# Patient Record
Sex: FEMALE | Race: BLACK OR AFRICAN AMERICAN | Employment: OTHER | URBAN - METROPOLITAN AREA
[De-identification: names, ages, dates, MRNs, and addresses within clinical notes are randomized per-mention and may not be internally consistent; named-entity substitution may affect disease eponyms.]

---

## 2017-03-05 ENCOUNTER — HOSPITAL ENCOUNTER (EMERGENCY)
Facility: HOSPITAL | Age: 67
Discharge: HOME | End: 2017-03-05
Payer: MEDICARE

## 2017-03-05 VITALS
HEIGHT: 70 IN | SYSTOLIC BLOOD PRESSURE: 131 MMHG | OXYGEN SATURATION: 94 % | HEART RATE: 69 BPM | TEMPERATURE: 98 F | DIASTOLIC BLOOD PRESSURE: 70 MMHG | RESPIRATION RATE: 18 BRPM

## 2017-03-05 DIAGNOSIS — S81.811A LEG LACERATION, RIGHT, INITIAL ENCOUNTER: Primary | ICD-10-CM

## 2017-03-05 PROCEDURE — 99283 EMERGENCY DEPT VISIT LOW MDM: CPT

## 2017-03-05 PROCEDURE — 90471 IMMUNIZATION ADMIN: CPT

## 2017-03-05 PROCEDURE — 12001 RPR S/N/AX/GEN/TRNK 2.5CM/<: CPT

## 2017-03-05 RX ORDER — CEPHALEXIN 500 MG/1
500 CAPSULE ORAL 4 TIMES DAILY
Qty: 20 CAPSULE | Refills: 0 | Status: SHIPPED | OUTPATIENT
Start: 2017-03-05 | End: 2017-03-10

## 2017-03-05 NOTE — ED PROVIDER NOTES
Patient Seen in: [facility] AND [facility] Emergency Department    History   CC: leg laceration  HPI: Fallon Kearns 78year old female  who presents to the ER c/o right lateral lower leg laceration status post injury today in which she states she was taking out - No data to display    MDM     Procedure: LACERATION REPAIR  Verbal consent was obtained from the patient. The 2 cm laceration on the right leg was anesthetized using 1% Lidocaine.  The wound was scrubbed, draped and explored to its base with a gloved fing

## 2017-03-05 NOTE — ED NOTES
Pt states cutting right lower lateral leg while picking up broken glass. Approx. 2 cm laceration noted, no active bleeding. CMS intact. Laceration irrigated and cleaned by Christy ARCINIEGA.

## 2017-03-05 NOTE — ED NOTES
Pt called by this RN in regards to discharge papers and prescription, no answer from pt. Left voicemail and will place discharge papers at triage.

## 2017-03-16 ENCOUNTER — LAB SERVICES (OUTPATIENT)
Dept: OTHER | Age: 67
End: 2017-03-16

## 2017-03-16 ENCOUNTER — CHARTING TRANS (OUTPATIENT)
Dept: OTHER | Age: 67
End: 2017-03-16

## 2017-03-18 ENCOUNTER — CHARTING TRANS (OUTPATIENT)
Dept: OTHER | Age: 67
End: 2017-03-18

## 2017-03-18 LAB
ALBUMIN SERPL-MCNC: 4.1 G/DL (ref 3.6–5.1)
ALBUMIN/GLOB SERPL: 1.1 (ref 1–2.4)
ALP SERPL-CCNC: 81 UNITS/L (ref 45–117)
ALT SERPL-CCNC: 23 UNITS/L
ANION GAP SERPL CALC-SCNC: 13 MMOL/L (ref 10–20)
AST SERPL-CCNC: 23 UNITS/L
BASOPHILS # BLD: 0 K/MCL (ref 0–0.3)
BASOPHILS NFR BLD: 0 %
BILIRUB SERPL-MCNC: 0.4 MG/DL (ref 0.2–1)
BUN SERPL-MCNC: 18 MG/DL (ref 6–20)
BUN/CREAT SERPL: 17 (ref 7–25)
CALCIUM SERPL-MCNC: 9.9 MG/DL (ref 8.4–10.2)
CHLORIDE SERPL-SCNC: 105 MMOL/L (ref 98–107)
CO2 SERPL-SCNC: 27 MMOL/L (ref 21–32)
CREAT SERPL-MCNC: 1.09 MG/DL (ref 0.51–0.95)
CREATININE RANDOM URINE: 198 MG/DL
DIFFERENTIAL METHOD BLD: ABNORMAL
EOSINOPHIL # BLD: 0.1 K/MCL (ref 0.1–0.5)
EOSINOPHIL NFR BLD: 1 %
ERYTHROCYTE [DISTWIDTH] IN BLOOD: 12.8 % (ref 11–15)
GLOBULIN SER-MCNC: 3.6 G/DL (ref 2–4)
GLUCOSE SERPL-MCNC: 121 MG/DL (ref 65–99)
HBA1C MFR BLD: 6.1 % (ref 4.5–5.6)
HEMATOCRIT: 38.2 % (ref 36–46.5)
HEMOGLOBIN: 12.1 G/DL (ref 12–15.5)
LDLC SERPL DIRECT ASSAY-MCNC: 111 MG/DL
LENGTH OF FAST TIME PATIENT: ABNORMAL HRS
LYMPHOCYTES # BLD: 3.8 K/MCL (ref 1–4)
LYMPHOCYTES NFR BLD: 37 %
MEAN CORPUSCULAR HEMOGLOBIN: 31.3 PG (ref 26–34)
MEAN CORPUSCULAR HGB CONC: 31.7 G/DL (ref 32–36.5)
MEAN CORPUSCULAR VOLUME: 99 FL (ref 78–100)
MICROALBUMIN UR-MCNC: 1.23 MG/DL
MICROALBUMIN/CREAT UR: 6.2 MCG/MG
MONOCYTES # BLD: 0.8 K/MCL (ref 0.3–0.9)
MONOCYTES NFR BLD: 8 %
NEUTROPHILS # BLD: 5.6 K/MCL (ref 1.8–7.7)
NEUTROPHILS NFR BLD: 54 %
PLATELET COUNT: 415 K/MCL (ref 140–450)
POTASSIUM SERPL-SCNC: 3.4 MMOL/L (ref 3.4–5.1)
RED CELL COUNT: 3.86 MIL/MCL (ref 4–5.2)
SODIUM SERPL-SCNC: 142 MMOL/L (ref 135–145)
TOTAL PROTEIN: 7.7 G/DL (ref 6.4–8.2)
WHITE BLOOD COUNT: 10.4 K/MCL (ref 4.2–11)

## 2017-04-28 ENCOUNTER — LAB SERVICES (OUTPATIENT)
Dept: OTHER | Age: 67
End: 2017-04-28

## 2017-04-28 ENCOUNTER — CHARTING TRANS (OUTPATIENT)
Dept: OTHER | Age: 67
End: 2017-04-28

## 2017-04-29 ENCOUNTER — CHARTING TRANS (OUTPATIENT)
Dept: OTHER | Age: 67
End: 2017-04-29

## 2017-04-29 LAB
ANNOTATION COMMENT IMP: NORMAL
HBV CORE IGG+IGM SER QL: NEGATIVE
HBV SURFACE AB SER QL: NEGATIVE
HBV SURFACE AG SER QL: NEGATIVE
HCV AB SER QL: NEGATIVE
HIV 1+2 AB+HIV1 P24 AG SERPL QL IA: NONREACTIVE

## 2017-06-03 ENCOUNTER — LAB SERVICES (OUTPATIENT)
Dept: OTHER | Age: 67
End: 2017-06-03

## 2017-06-03 ENCOUNTER — CHARTING TRANS (OUTPATIENT)
Dept: OTHER | Age: 67
End: 2017-06-03

## 2017-06-03 LAB — RAPID STREP GROUP A: POSITIVE

## 2017-06-20 ENCOUNTER — CHARTING TRANS (OUTPATIENT)
Dept: OTHER | Age: 67
End: 2017-06-20

## 2017-08-07 ENCOUNTER — CHARTING TRANS (OUTPATIENT)
Dept: OTHER | Age: 67
End: 2017-08-07

## 2017-08-07 ASSESSMENT — PAIN SCALES - GENERAL: PAINLEVEL_OUTOF10: 0

## 2017-08-24 ENCOUNTER — LAB SERVICES (OUTPATIENT)
Dept: OTHER | Age: 67
End: 2017-08-24

## 2017-08-25 ENCOUNTER — CHARTING TRANS (OUTPATIENT)
Dept: OTHER | Age: 67
End: 2017-08-25

## 2017-08-25 LAB
BUN SERPL-MCNC: 18 MG/DL (ref 6–20)
BUN/CREAT SERPL: 19 (ref 7–25)
CREAT SERPL-MCNC: 0.96 MG/DL (ref 0.51–0.95)

## 2017-11-14 ENCOUNTER — CHARTING TRANS (OUTPATIENT)
Dept: OTHER | Age: 67
End: 2017-11-14

## 2017-11-14 ENCOUNTER — LAB SERVICES (OUTPATIENT)
Dept: OTHER | Age: 67
End: 2017-11-14

## 2017-11-17 ENCOUNTER — CHARTING TRANS (OUTPATIENT)
Dept: OTHER | Age: 67
End: 2017-11-17

## 2017-11-17 LAB
ALBUMIN SERPL-MCNC: 3.8 G/DL (ref 3.6–5.1)
ALBUMIN/GLOB SERPL: 1.1 (ref 1–2.4)
ALP SERPL-CCNC: 85 UNITS/L (ref 45–117)
ALT SERPL-CCNC: 23 UNITS/L
ANION GAP SERPL CALC-SCNC: 11 MMOL/L (ref 10–20)
APPEARANCE UR: CLEAR
AST SERPL-CCNC: 16 UNITS/L
BACTERIA #/AREA URNS HPF: NORMAL /HPF
BASOPHILS # BLD: 0 K/MCL (ref 0–0.3)
BASOPHILS NFR BLD: 0 %
BILIRUB SERPL-MCNC: 0.4 MG/DL (ref 0.2–1)
BILIRUB UR QL: NEGATIVE
BUN SERPL-MCNC: 14 MG/DL (ref 6–20)
BUN/CREAT SERPL: 15 (ref 7–25)
CALCIUM SERPL-MCNC: 9.8 MG/DL (ref 8.4–10.2)
CHLORIDE SERPL-SCNC: 106 MMOL/L (ref 98–107)
CO2 SERPL-SCNC: 29 MMOL/L (ref 21–32)
COLOR UR: YELLOW
CREAT SERPL-MCNC: 0.91 MG/DL (ref 0.51–0.95)
DIFFERENTIAL METHOD BLD: ABNORMAL
EOSINOPHIL # BLD: 0.1 K/MCL (ref 0.1–0.5)
EOSINOPHIL NFR BLD: 2 %
ERYTHROCYTE [DISTWIDTH] IN BLOOD: 13.1 % (ref 11–15)
GLOBULIN SER-MCNC: 3.6 G/DL (ref 2–4)
GLUCOSE SERPL-MCNC: 111 MG/DL (ref 65–99)
GLUCOSE UR-MCNC: NEGATIVE MG/DL
HBA1C MFR BLD: 6.5 % (ref 4.5–5.6)
HDLC SERPL-MCNC: 64 MG/DL
HEMATOCRIT: 37.4 % (ref 36–46.5)
HEMOGLOBIN: 12 G/DL (ref 12–15.5)
HYALINE CASTS #/AREA URNS LPF: NORMAL /LPF (ref 0–5)
KETONES UR-MCNC: NEGATIVE MG/DL
LDLC SERPL DIRECT ASSAY-MCNC: 108 MG/DL
LENGTH OF FAST TIME PATIENT: 0 HRS
LYMPHOCYTES # BLD: 3.4 K/MCL (ref 1–4)
LYMPHOCYTES NFR BLD: 41 %
MEAN CORPUSCULAR HEMOGLOBIN: 31.6 PG (ref 26–34)
MEAN CORPUSCULAR HGB CONC: 32.1 G/DL (ref 32–36.5)
MEAN CORPUSCULAR VOLUME: 98.4 FL (ref 78–100)
MONOCYTES # BLD: 0.7 K/MCL (ref 0.3–0.9)
MONOCYTES NFR BLD: 9 %
MUCOUS THREADS URNS QL MICRO: PRESENT
NEUTROPHILS # BLD: 4 K/MCL (ref 1.8–7.7)
NEUTROPHILS NFR BLD: 48 %
NITRITE UR QL: NEGATIVE
PH UR: 5 UNITS (ref 5–7)
PLATELET COUNT: 416 K/MCL (ref 140–450)
POTASSIUM SERPL-SCNC: 4 MMOL/L (ref 3.4–5.1)
PROT UR QL: NEGATIVE MG/DL
RBC #/AREA URNS HPF: NORMAL /HPF (ref 0–3)
RBC-URINE: NEGATIVE
RED CELL COUNT: 3.8 MIL/MCL (ref 4–5.2)
SODIUM SERPL-SCNC: 142 MMOL/L (ref 135–145)
SP GR UR: 1.02 (ref 1–1.03)
SPECIMEN SOURCE: NORMAL
SQUAMOUS #/AREA URNS HPF: NORMAL /HPF (ref 0–5)
TOTAL PROTEIN: 7.4 G/DL (ref 6.4–8.2)
UROBILINOGEN UR QL: 0.2 MG/DL (ref 0–1)
WBC #/AREA URNS HPF: NORMAL /HPF (ref 0–5)
WBC-URINE: NEGATIVE
WHITE BLOOD COUNT: 8.2 K/MCL (ref 4.2–11)

## 2018-04-17 ENCOUNTER — LAB SERVICES (OUTPATIENT)
Dept: OTHER | Age: 68
End: 2018-04-17

## 2018-04-17 ENCOUNTER — CHARTING TRANS (OUTPATIENT)
Dept: OTHER | Age: 68
End: 2018-04-17

## 2018-04-18 ENCOUNTER — CHARTING TRANS (OUTPATIENT)
Dept: OTHER | Age: 68
End: 2018-04-18

## 2018-04-18 LAB
ALBUMIN SERPL-MCNC: 4 G/DL (ref 3.6–5.1)
ALBUMIN/GLOB SERPL: 1.1 (ref 1–2.4)
ALP SERPL-CCNC: 84 UNITS/L (ref 45–117)
ALT SERPL-CCNC: 37 UNITS/L
ANION GAP SERPL CALC-SCNC: 12 MMOL/L (ref 10–20)
APPEARANCE UR: CLEAR
AST SERPL-CCNC: 22 UNITS/L
BACTERIA #/AREA URNS HPF: ABNORMAL /HPF
BASOPHILS # BLD: 0 K/MCL (ref 0–0.3)
BASOPHILS NFR BLD: 0 %
BILIRUB SERPL-MCNC: 0.5 MG/DL (ref 0.2–1)
BILIRUB UR QL: NEGATIVE
BUN SERPL-MCNC: 13 MG/DL (ref 6–20)
BUN/CREAT SERPL: 14 (ref 7–25)
CALCIUM SERPL-MCNC: 9.5 MG/DL (ref 8.4–10.2)
CHLORIDE SERPL-SCNC: 105 MMOL/L (ref 98–107)
CHOLEST SERPL-MCNC: 188 MG/DL
CHOLEST/HDLC SERPL: 2.8
CO2 SERPL-SCNC: 28 MMOL/L (ref 21–32)
COLOR UR: YELLOW
CREAT SERPL-MCNC: 0.94 MG/DL (ref 0.51–0.95)
DIFFERENTIAL METHOD BLD: ABNORMAL
EOSINOPHIL # BLD: 0.1 K/MCL (ref 0.1–0.5)
EOSINOPHIL NFR BLD: 1 %
ERYTHROCYTE [DISTWIDTH] IN BLOOD: 12.6 % (ref 11–15)
GLOBULIN SER-MCNC: 3.8 G/DL (ref 2–4)
GLUCOSE SERPL-MCNC: 122 MG/DL (ref 65–99)
GLUCOSE UR-MCNC: NEGATIVE MG/DL
HBA1C MFR BLD: 6.3 % (ref 4.5–5.6)
HDLC SERPL-MCNC: 68 MG/DL
HEMATOCRIT: 40.8 % (ref 36–46.5)
HEMOGLOBIN: 13 G/DL (ref 12–15.5)
HYALINE CASTS #/AREA URNS LPF: ABNORMAL /LPF (ref 0–5)
IMM GRANULOCYTES # BLD AUTO: 0 K/MCL (ref 0–0.2)
IMM GRANULOCYTES NFR BLD: 0 %
KETONES UR-MCNC: NEGATIVE MG/DL
LDLC SERPL CALC-MCNC: 107 MG/DL
LENGTH OF FAST TIME PATIENT: ABNORMAL HRS
LENGTH OF FAST TIME PATIENT: ABNORMAL HRS
LYMPHOCYTES # BLD: 3.7 K/MCL (ref 1–4)
LYMPHOCYTES NFR BLD: 37 %
MEAN CORPUSCULAR HEMOGLOBIN: 31.5 PG (ref 26–34)
MEAN CORPUSCULAR HGB CONC: 31.9 G/DL (ref 32–36.5)
MEAN CORPUSCULAR VOLUME: 98.8 FL (ref 78–100)
MONOCYTES # BLD: 0.8 K/MCL (ref 0.3–0.9)
MONOCYTES NFR BLD: 8 %
MUCOUS THREADS URNS QL MICRO: PRESENT
NEUTROPHILS # BLD: 5.3 K/MCL (ref 1.8–7.7)
NEUTROPHILS NFR BLD: 54 %
NITRITE UR QL: NEGATIVE
NONHDLC SERPL-MCNC: 120 MG/DL
NRBC (NRBCRE): 0 %
PH UR: 7 UNITS (ref 5–7)
PLATELET COUNT: 394 K/MCL (ref 140–450)
POTASSIUM SERPL-SCNC: 3.8 MMOL/L (ref 3.4–5.1)
PROT UR QL: NEGATIVE MG/DL
RBC #/AREA URNS HPF: ABNORMAL /HPF (ref 0–3)
RBC-URINE: NEGATIVE
RED CELL COUNT: 4.13 MIL/MCL (ref 4–5.2)
SODIUM SERPL-SCNC: 141 MMOL/L (ref 135–145)
SP GR UR: 1.02 (ref 1–1.03)
SPECIMEN SOURCE: ABNORMAL
SQUAMOUS #/AREA URNS HPF: ABNORMAL /HPF (ref 0–5)
TOTAL PROTEIN: 7.8 G/DL (ref 6.4–8.2)
TRIGL SERPL-MCNC: 67 MG/DL
TSH SERPL-ACNC: 2.49 MCUNITS/ML (ref 0.35–5)
UROBILINOGEN UR QL: 0.2 MG/DL (ref 0–1)
WBC #/AREA URNS HPF: ABNORMAL /HPF (ref 0–5)
WBC-URINE: NEGATIVE
WHITE BLOOD COUNT: 9.9 K/MCL (ref 4.2–11)

## 2018-07-16 ENCOUNTER — LAB SERVICES (OUTPATIENT)
Dept: OTHER | Age: 68
End: 2018-07-16

## 2018-07-16 ENCOUNTER — CHARTING TRANS (OUTPATIENT)
Dept: OTHER | Age: 68
End: 2018-07-16

## 2018-07-17 ENCOUNTER — CHARTING TRANS (OUTPATIENT)
Dept: OTHER | Age: 68
End: 2018-07-17

## 2018-07-17 LAB — HBA1C MFR BLD: 6.7 % (ref 4.5–5.6)

## 2018-08-06 ENCOUNTER — CHARTING TRANS (OUTPATIENT)
Dept: OTHER | Age: 68
End: 2018-08-06

## 2018-10-16 ENCOUNTER — CHARTING TRANS (OUTPATIENT)
Dept: OTHER | Age: 68
End: 2018-10-16

## 2018-10-16 ENCOUNTER — LAB SERVICES (OUTPATIENT)
Dept: OTHER | Age: 68
End: 2018-10-16

## 2018-10-16 LAB — HBA1C MFR BLD: 6.7 % (ref 4.5–5.6)

## 2018-10-18 ENCOUNTER — CHARTING TRANS (OUTPATIENT)
Dept: OTHER | Age: 68
End: 2018-10-18

## 2018-10-18 LAB
CREATININE RANDOM URINE: 250 MG/DL
HDLC SERPL-MCNC: 61 MG/DL
LDLC SERPL DIRECT ASSAY-MCNC: 129 MG/DL
MAGNESIUM SERPL-MCNC: 1.9 MG/DL (ref 1.7–2.4)
MICROALBUMIN UR-MCNC: 1.3 MG/DL
MICROALBUMIN/CREAT UR: 5.2 MG/G
TSH SERPL-ACNC: 2.31 MCUNITS/ML (ref 0.35–5)

## 2018-10-31 VITALS
BODY MASS INDEX: 41.95 KG/M2 | DIASTOLIC BLOOD PRESSURE: 76 MMHG | HEART RATE: 80 BPM | HEIGHT: 70 IN | RESPIRATION RATE: 18 BRPM | SYSTOLIC BLOOD PRESSURE: 140 MMHG | TEMPERATURE: 98.5 F | WEIGHT: 293 LBS

## 2018-11-01 VITALS
TEMPERATURE: 98.7 F | SYSTOLIC BLOOD PRESSURE: 135 MMHG | DIASTOLIC BLOOD PRESSURE: 86 MMHG | HEART RATE: 76 BPM | RESPIRATION RATE: 18 BRPM | HEIGHT: 70 IN | WEIGHT: 293 LBS | BODY MASS INDEX: 41.95 KG/M2

## 2018-11-02 VITALS
HEART RATE: 86 BPM | HEIGHT: 70 IN | BODY MASS INDEX: 41.66 KG/M2 | DIASTOLIC BLOOD PRESSURE: 71 MMHG | SYSTOLIC BLOOD PRESSURE: 135 MMHG | RESPIRATION RATE: 20 BRPM | TEMPERATURE: 98.4 F | WEIGHT: 291 LBS

## 2018-11-03 VITALS
DIASTOLIC BLOOD PRESSURE: 89 MMHG | WEIGHT: 289 LBS | TEMPERATURE: 98.2 F | BODY MASS INDEX: 41.37 KG/M2 | SYSTOLIC BLOOD PRESSURE: 129 MMHG | RESPIRATION RATE: 20 BRPM | HEART RATE: 79 BPM | HEIGHT: 70 IN

## 2018-11-03 VITALS
RESPIRATION RATE: 20 BRPM | OXYGEN SATURATION: 100 % | TEMPERATURE: 98.7 F | HEIGHT: 70 IN | DIASTOLIC BLOOD PRESSURE: 84 MMHG | BODY MASS INDEX: 40.8 KG/M2 | WEIGHT: 285 LBS | SYSTOLIC BLOOD PRESSURE: 150 MMHG | HEART RATE: 85 BPM

## 2018-11-03 VITALS
SYSTOLIC BLOOD PRESSURE: 122 MMHG | TEMPERATURE: 98 F | HEART RATE: 91 BPM | DIASTOLIC BLOOD PRESSURE: 86 MMHG | OXYGEN SATURATION: 98 % | RESPIRATION RATE: 20 BRPM | WEIGHT: 282 LBS

## 2018-11-04 VITALS
TEMPERATURE: 98.2 F | WEIGHT: 285 LBS | SYSTOLIC BLOOD PRESSURE: 145 MMHG | DIASTOLIC BLOOD PRESSURE: 81 MMHG | BODY MASS INDEX: 40.8 KG/M2 | HEART RATE: 73 BPM | HEIGHT: 70 IN | RESPIRATION RATE: 18 BRPM

## 2018-11-05 VITALS
HEART RATE: 87 BPM | TEMPERATURE: 98.4 F | RESPIRATION RATE: 20 BRPM | HEIGHT: 70 IN | SYSTOLIC BLOOD PRESSURE: 97 MMHG | WEIGHT: 280 LBS | BODY MASS INDEX: 40.09 KG/M2 | DIASTOLIC BLOOD PRESSURE: 62 MMHG

## 2018-11-21 ENCOUNTER — CHARTING TRANS (OUTPATIENT)
Dept: OTHER | Age: 68
End: 2018-11-21

## 2018-11-27 VITALS
HEART RATE: 80 BPM | RESPIRATION RATE: 18 BRPM | HEIGHT: 70 IN | SYSTOLIC BLOOD PRESSURE: 140 MMHG | OXYGEN SATURATION: 97 % | WEIGHT: 293 LBS | BODY MASS INDEX: 41.95 KG/M2 | TEMPERATURE: 98.1 F | DIASTOLIC BLOOD PRESSURE: 80 MMHG

## 2018-11-28 ENCOUNTER — CHARTING TRANS (OUTPATIENT)
Dept: OTHER | Age: 68
End: 2018-11-28

## 2018-12-01 ENCOUNTER — PRIOR ORIGINAL RECORDS (OUTPATIENT)
Dept: HEALTH INFORMATION MANAGEMENT | Facility: OTHER | Age: 68
End: 2018-12-01

## 2018-12-04 ENCOUNTER — TELEPHONE (OUTPATIENT)
Dept: SCHEDULING | Age: 68
End: 2018-12-04

## 2018-12-11 ENCOUNTER — TELEPHONE (OUTPATIENT)
Dept: SCHEDULING | Age: 68
End: 2018-12-11

## 2018-12-20 ENCOUNTER — TELEPHONE (OUTPATIENT)
Dept: SCHEDULING | Age: 68
End: 2018-12-20

## 2019-01-01 ENCOUNTER — EXTERNAL RECORD (OUTPATIENT)
Dept: HEALTH INFORMATION MANAGEMENT | Facility: OTHER | Age: 69
End: 2019-01-01

## 2019-01-03 ENCOUNTER — TELEPHONE (OUTPATIENT)
Dept: CARDIOLOGY | Age: 69
End: 2019-01-03

## 2019-01-03 ENCOUNTER — PRIOR ORIGINAL RECORDS (OUTPATIENT)
Dept: OTHER | Age: 69
End: 2019-01-03

## 2019-01-14 VITALS
RESPIRATION RATE: 18 BRPM | DIASTOLIC BLOOD PRESSURE: 72 MMHG | SYSTOLIC BLOOD PRESSURE: 143 MMHG | HEIGHT: 70 IN | WEIGHT: 293 LBS | OXYGEN SATURATION: 98 % | TEMPERATURE: 98.1 F | BODY MASS INDEX: 41.95 KG/M2 | HEART RATE: 44 BPM

## 2019-01-15 ENCOUNTER — MYAURORA ACCOUNT LINK (OUTPATIENT)
Dept: OTHER | Age: 69
End: 2019-01-15

## 2019-01-16 ENCOUNTER — TELEPHONE (OUTPATIENT)
Dept: SCHEDULING | Age: 69
End: 2019-01-16

## 2019-01-16 DIAGNOSIS — I10 BENIGN ESSENTIAL HYPERTENSION: Primary | ICD-10-CM

## 2019-01-16 RX ORDER — LOSARTAN POTASSIUM 100 MG/1
TABLET ORAL
COMMUNITY
Start: 2018-07-16 | End: 2019-01-16 | Stop reason: CLARIF

## 2019-01-16 RX ORDER — IRBESARTAN 150 MG/1
150 TABLET ORAL DAILY
Qty: 90 TABLET | Refills: 1 | Status: SHIPPED | OUTPATIENT
Start: 2019-01-16 | End: 2019-05-23 | Stop reason: RX

## 2019-01-18 ENCOUNTER — TELEPHONE (OUTPATIENT)
Dept: SCHEDULING | Age: 69
End: 2019-01-18

## 2019-01-18 RX ORDER — PRAVASTATIN SODIUM 80 MG/1
TABLET ORAL
COMMUNITY
Start: 2018-10-26 | End: 2019-05-21 | Stop reason: ALTCHOICE

## 2019-01-18 RX ORDER — PREDNISONE 20 MG/1
20 TABLET ORAL 2 TIMES DAILY
COMMUNITY
Start: 2018-11-28 | End: 2019-05-21 | Stop reason: ALTCHOICE

## 2019-01-18 RX ORDER — MONTELUKAST SODIUM 10 MG/1
TABLET ORAL
COMMUNITY
Start: 2018-07-16 | End: 2019-02-19 | Stop reason: SDUPTHER

## 2019-01-18 RX ORDER — FLUTICASONE PROPIONATE AND SALMETEROL 250; 50 UG/1; UG/1
POWDER RESPIRATORY (INHALATION)
COMMUNITY
Start: 2018-04-17 | End: 2019-04-17

## 2019-01-18 RX ORDER — FENOFIBRATE 200 MG/1
200 CAPSULE ORAL DAILY
COMMUNITY
Start: 2018-10-16 | End: 2019-07-19 | Stop reason: SDUPTHER

## 2019-01-18 RX ORDER — HYDROCHLOROTHIAZIDE 12.5 MG/1
CAPSULE, GELATIN COATED ORAL
COMMUNITY
Start: 2018-10-16 | End: 2019-03-28 | Stop reason: ALTCHOICE

## 2019-01-18 RX ORDER — FLUTICASONE FUROATE AND VILANTEROL 200; 25 UG/1; UG/1
POWDER RESPIRATORY (INHALATION)
COMMUNITY
Start: 2018-10-16 | End: 2019-02-19

## 2019-01-18 RX ORDER — DILTIAZEM HYDROCHLORIDE 180 MG/1
CAPSULE, COATED, EXTENDED RELEASE ORAL
COMMUNITY
Start: 2018-07-16 | End: 2019-02-19 | Stop reason: SDUPTHER

## 2019-01-18 RX ORDER — TOLTERODINE 4 MG/1
CAPSULE, EXTENDED RELEASE ORAL
COMMUNITY
End: 2021-04-07

## 2019-01-18 RX ORDER — DICLOFENAC SODIUM 75 MG/1
75 TABLET, DELAYED RELEASE ORAL DAILY
COMMUNITY
Start: 2018-10-16 | End: 2019-10-16

## 2019-01-18 RX ORDER — PREGABALIN 50 MG/1
CAPSULE ORAL
COMMUNITY
Start: 2018-10-16 | End: 2020-07-01 | Stop reason: SDUPTHER

## 2019-01-18 RX ORDER — FLUTICASONE PROPIONATE 50 MCG
SPRAY, SUSPENSION (ML) NASAL
COMMUNITY
End: 2019-05-08 | Stop reason: SDUPTHER

## 2019-01-18 RX ORDER — ALBUTEROL SULFATE 90 UG/1
AEROSOL, METERED RESPIRATORY (INHALATION)
COMMUNITY
Start: 2018-10-16 | End: 2020-09-25 | Stop reason: SDUPTHER

## 2019-01-18 RX ORDER — LOSARTAN POTASSIUM 100 MG/1
TABLET ORAL
COMMUNITY
End: 2019-02-19 | Stop reason: ALTCHOICE

## 2019-01-25 ENCOUNTER — PRIOR ORIGINAL RECORDS (OUTPATIENT)
Dept: OTHER | Age: 69
End: 2019-01-25

## 2019-01-28 ENCOUNTER — TELEPHONE (OUTPATIENT)
Dept: SCHEDULING | Age: 69
End: 2019-01-28

## 2019-01-30 ENCOUNTER — PRIOR ORIGINAL RECORDS (OUTPATIENT)
Dept: OTHER | Age: 69
End: 2019-01-30

## 2019-01-31 ENCOUNTER — APPOINTMENT (OUTPATIENT)
Dept: CARDIOLOGY | Age: 69
End: 2019-01-31

## 2019-02-07 ENCOUNTER — PRIOR ORIGINAL RECORDS (OUTPATIENT)
Dept: OTHER | Age: 69
End: 2019-02-07

## 2019-02-07 RX ORDER — MULTIVIT WITH MINERALS/LUTEIN
1000 TABLET ORAL DAILY
COMMUNITY

## 2019-02-07 RX ORDER — MAGNESIUM GLUCONATE 30 MG(550)
TABLET ORAL DAILY
COMMUNITY
End: 2021-03-16

## 2019-02-07 RX ORDER — IRBESARTAN 150 MG/1
150 TABLET ORAL DAILY
COMMUNITY
End: 2019-06-11 | Stop reason: ALTCHOICE

## 2019-02-08 ENCOUNTER — PRIOR ORIGINAL RECORDS (OUTPATIENT)
Dept: OTHER | Age: 69
End: 2019-02-08

## 2019-02-08 ENCOUNTER — LAB ENCOUNTER (OUTPATIENT)
Dept: LAB | Facility: HOSPITAL | Age: 69
End: 2019-02-08
Attending: INTERNAL MEDICINE
Payer: MEDICARE

## 2019-02-08 DIAGNOSIS — Z01.810 PRE-OPERATIVE CARDIOVASCULAR EXAMINATION: ICD-10-CM

## 2019-02-08 DIAGNOSIS — E78.00 HYPERCHOLESTEROLEMIA: ICD-10-CM

## 2019-02-08 DIAGNOSIS — R94.31 ABNORMAL EKG: ICD-10-CM

## 2019-02-08 LAB
ANION GAP SERPL CALC-SCNC: 12 MMOL/L (ref 0–18)
BASOPHILS # BLD AUTO: 0.03 X10(3) UL (ref 0–0.2)
BASOPHILS NFR BLD AUTO: 0.3 %
BUN SERPL-MCNC: 12 MG/DL (ref 8–20)
BUN/CREAT SERPL: 12 (ref 10–20)
CALCIUM SERPL-MCNC: 10 MG/DL (ref 8.5–10.5)
CHLORIDE SERPL-SCNC: 103 MMOL/L (ref 95–110)
CO2 SERPL-SCNC: 24 MMOL/L (ref 22–32)
CREAT SERPL-MCNC: 1 MG/DL (ref 0.5–1.5)
DEPRECATED RDW RBC AUTO: 45.8 FL (ref 35.1–46.3)
EOSINOPHIL # BLD AUTO: 0.13 X10(3) UL (ref 0–0.7)
EOSINOPHIL NFR BLD AUTO: 1.4 %
ERYTHROCYTE [DISTWIDTH] IN BLOOD BY AUTOMATED COUNT: 12.6 % (ref 11–15)
GLUCOSE SERPL-MCNC: 147 MG/DL (ref 70–99)
HCT VFR BLD AUTO: 39.7 % (ref 35–48)
HGB BLD-MCNC: 12.8 G/DL (ref 12–16)
IMM GRANULOCYTES # BLD AUTO: 0.03 X10(3) UL (ref 0–1)
IMM GRANULOCYTES NFR BLD: 0.3 %
LYMPHOCYTES # BLD AUTO: 3.4 X10(3) UL (ref 1–4)
LYMPHOCYTES NFR BLD AUTO: 37.3 %
MCH RBC QN AUTO: 32.2 PG (ref 26–34)
MCHC RBC AUTO-ENTMCNC: 32.2 G/DL (ref 31–37)
MCV RBC AUTO: 99.7 FL (ref 80–100)
MONOCYTES # BLD AUTO: 0.86 X10(3) UL (ref 0.1–1)
MONOCYTES NFR BLD AUTO: 9.4 %
NEUTROPHILS # BLD AUTO: 4.66 X10 (3) UL (ref 1.5–7.7)
NEUTROPHILS # BLD AUTO: 4.66 X10(3) UL (ref 1.5–7.7)
NEUTROPHILS NFR BLD AUTO: 51.3 %
OSMOLALITY UR CALC.SUM OF ELEC: 290 MOSM/KG (ref 275–295)
PLATELET # BLD AUTO: 397 10(3)UL (ref 150–450)
POTASSIUM SERPL-SCNC: 3.8 MMOL/L (ref 3.3–5.1)
RBC # BLD AUTO: 3.98 X10(6)UL (ref 3.8–5.3)
SODIUM SERPL-SCNC: 139 MMOL/L (ref 136–144)
WBC # BLD AUTO: 9.1 X10(3) UL (ref 4–11)

## 2019-02-08 PROCEDURE — 85025 COMPLETE CBC W/AUTO DIFF WBC: CPT

## 2019-02-08 PROCEDURE — 36415 COLL VENOUS BLD VENIPUNCTURE: CPT

## 2019-02-08 PROCEDURE — 80048 BASIC METABOLIC PNL TOTAL CA: CPT

## 2019-02-11 ENCOUNTER — HOSPITAL ENCOUNTER (OUTPATIENT)
Dept: INTERVENTIONAL RADIOLOGY/VASCULAR | Facility: HOSPITAL | Age: 69
Discharge: HOME | End: 2019-02-11
Attending: INTERNAL MEDICINE | Admitting: INTERNAL MEDICINE
Payer: MEDICARE

## 2019-02-11 VITALS
HEIGHT: 69 IN | HEART RATE: 70 BPM | RESPIRATION RATE: 19 BRPM | DIASTOLIC BLOOD PRESSURE: 92 MMHG | OXYGEN SATURATION: 100 % | WEIGHT: 282 LBS | SYSTOLIC BLOOD PRESSURE: 143 MMHG | BODY MASS INDEX: 41.77 KG/M2

## 2019-02-11 DIAGNOSIS — R94.31 ABNORMAL EKG: ICD-10-CM

## 2019-02-11 DIAGNOSIS — E10.9 DIABETES MELLITUS TYPE 1 (HCC): ICD-10-CM

## 2019-02-11 DIAGNOSIS — E78.00 HYPERCHOLESTEREMIA: ICD-10-CM

## 2019-02-11 LAB — GLUCOSE BLDC GLUCOMTR-MCNC: 127 MG/DL (ref 70–99)

## 2019-02-11 PROCEDURE — B2141ZZ FLUOROSCOPY OF RIGHT HEART USING LOW OSMOLAR CONTRAST: ICD-10-PCS | Performed by: INTERNAL MEDICINE

## 2019-02-11 PROCEDURE — 82962 GLUCOSE BLOOD TEST: CPT

## 2019-02-11 PROCEDURE — 99152 MOD SED SAME PHYS/QHP 5/>YRS: CPT

## 2019-02-11 PROCEDURE — B2151ZZ FLUOROSCOPY OF LEFT HEART USING LOW OSMOLAR CONTRAST: ICD-10-PCS | Performed by: INTERNAL MEDICINE

## 2019-02-11 PROCEDURE — 99153 MOD SED SAME PHYS/QHP EA: CPT

## 2019-02-11 PROCEDURE — 93460 R&L HRT ART/VENTRICLE ANGIO: CPT

## 2019-02-11 PROCEDURE — B2111ZZ FLUOROSCOPY OF MULTIPLE CORONARY ARTERIES USING LOW OSMOLAR CONTRAST: ICD-10-PCS | Performed by: INTERNAL MEDICINE

## 2019-02-11 PROCEDURE — 4A023N8 MEASUREMENT OF CARDIAC SAMPLING AND PRESSURE, BILATERAL, PERCUTANEOUS APPROACH: ICD-10-PCS | Performed by: INTERNAL MEDICINE

## 2019-02-11 RX ORDER — SODIUM CHLORIDE 9 MG/ML
INJECTION, SOLUTION INTRAVENOUS CONTINUOUS
Status: DISCONTINUED | OUTPATIENT
Start: 2019-02-11 | End: 2019-02-11

## 2019-02-11 RX ORDER — ACETAMINOPHEN AND CODEINE PHOSPHATE 300; 30 MG/1; MG/1
TABLET ORAL
Status: COMPLETED
Start: 2019-02-11 | End: 2019-02-11

## 2019-02-11 RX ORDER — SODIUM CHLORIDE 9 MG/ML
INJECTION, SOLUTION INTRAVENOUS
Status: DISCONTINUED
Start: 2019-02-11 | End: 2019-02-11

## 2019-02-11 RX ORDER — MIDAZOLAM HYDROCHLORIDE 1 MG/ML
INJECTION INTRAMUSCULAR; INTRAVENOUS
Status: DISCONTINUED
Start: 2019-02-11 | End: 2019-02-11 | Stop reason: WASHOUT

## 2019-02-11 RX ORDER — LIDOCAINE HYDROCHLORIDE 20 MG/ML
INJECTION, SOLUTION EPIDURAL; INFILTRATION; INTRACAUDAL; PERINEURAL
Status: COMPLETED
Start: 2019-02-11 | End: 2019-02-11

## 2019-02-11 RX ORDER — ACETAMINOPHEN AND CODEINE PHOSPHATE 300; 30 MG/1; MG/1
1 TABLET ORAL ONCE
Status: COMPLETED | OUTPATIENT
Start: 2019-02-11 | End: 2019-02-11

## 2019-02-11 RX ORDER — MIDAZOLAM HYDROCHLORIDE 1 MG/ML
INJECTION INTRAMUSCULAR; INTRAVENOUS
Status: COMPLETED
Start: 2019-02-11 | End: 2019-02-11

## 2019-02-11 RX ADMIN — ACETAMINOPHEN AND CODEINE PHOSPHATE 1 TABLET: 300; 30 TABLET ORAL at 10:15:00

## 2019-02-11 RX ADMIN — ACETAMINOPHEN AND CODEINE PHOSPHATE 1 TABLET: 300; 30 TABLET ORAL at 11:08:00

## 2019-02-11 NOTE — H&P
[facility] AND CLINICS  [facility] Cardiology H&P Note    [place] Handler Patient Status:  Outpatient    1950 MRN [id number]   Location [facility] Attending Elinda Olszewski, MD   Hosp Day # 0 PCP Kal Braden     70 year ol constitutional distress. HEENT: Normocephalic, anicteric sclera, neck supple. Neck: No JVD, carotids 2+, no bruits. Cardiac: Regular rate and rhythm.  S1, S2 normal. No murmur, pericardial rub, S3.  Lungs: Clear without wheezes, rales, rhonchi or dullnes

## 2019-02-11 NOTE — PROCEDURES
[facility]    [facility] Cardiac Cath Procedure Note  Berrios [place] Patient Status:  Outpatient    1950 MRN [id number]   Location [facility] Attending Lurlean Simmonds, MD   Hosp Day # 0 PCP John Torres 4738

## 2019-02-18 ENCOUNTER — TELEPHONE (OUTPATIENT)
Dept: SCHEDULING | Age: 69
End: 2019-02-18

## 2019-02-18 ENCOUNTER — PRIOR ORIGINAL RECORDS (OUTPATIENT)
Dept: OTHER | Age: 69
End: 2019-02-18

## 2019-02-19 ENCOUNTER — OFFICE VISIT (OUTPATIENT)
Dept: INTERNAL MEDICINE | Age: 69
End: 2019-02-19

## 2019-02-19 ENCOUNTER — LAB SERVICES (OUTPATIENT)
Dept: LAB | Age: 69
End: 2019-02-19

## 2019-02-19 VITALS
RESPIRATION RATE: 16 BRPM | DIASTOLIC BLOOD PRESSURE: 81 MMHG | WEIGHT: 293 LBS | TEMPERATURE: 98.6 F | HEART RATE: 74 BPM | SYSTOLIC BLOOD PRESSURE: 140 MMHG | HEIGHT: 70 IN | BODY MASS INDEX: 41.95 KG/M2

## 2019-02-19 DIAGNOSIS — E78.2 MIXED HYPERLIPIDEMIA: ICD-10-CM

## 2019-02-19 DIAGNOSIS — E11.9 CONTROLLED TYPE 2 DIABETES MELLITUS WITHOUT COMPLICATION, WITHOUT LONG-TERM CURRENT USE OF INSULIN (CMD): Primary | ICD-10-CM

## 2019-02-19 DIAGNOSIS — I25.10 CORONARY ARTERIOSCLEROSIS: ICD-10-CM

## 2019-02-19 DIAGNOSIS — K21.9 GASTROESOPHAGEAL REFLUX DISEASE WITHOUT ESOPHAGITIS: ICD-10-CM

## 2019-02-19 DIAGNOSIS — E66.01 MORBID OBESITY (CMD): ICD-10-CM

## 2019-02-19 DIAGNOSIS — I10 BENIGN ESSENTIAL HYPERTENSION: ICD-10-CM

## 2019-02-19 DIAGNOSIS — E11.9 CONTROLLED TYPE 2 DIABETES MELLITUS WITHOUT COMPLICATION, WITHOUT LONG-TERM CURRENT USE OF INSULIN (CMD): ICD-10-CM

## 2019-02-19 DIAGNOSIS — J45.20 MILD INTERMITTENT ASTHMA WITHOUT COMPLICATION: ICD-10-CM

## 2019-02-19 PROBLEM — G56.00 CARPAL TUNNEL SYNDROME: Status: ACTIVE | Noted: 2018-07-26

## 2019-02-19 PROBLEM — R25.2 MUSCLE CRAMPS: Status: ACTIVE | Noted: 2018-10-16

## 2019-02-19 PROBLEM — I49.8 BIGEMINY: Status: ACTIVE | Noted: 2018-11-28

## 2019-02-19 PROBLEM — M94.0 COSTOCHONDRITIS: Status: ACTIVE | Noted: 2018-04-17

## 2019-02-19 PROBLEM — R15.9 ENCOPRESIS: Status: ACTIVE | Noted: 2018-07-16

## 2019-02-19 PROBLEM — H90.3 SENSORINEURAL HEARING LOSS (SNHL), BILATERAL: Status: ACTIVE | Noted: 2017-06-20

## 2019-02-19 PROBLEM — M79.7 FIBROMYALGIA: Status: ACTIVE | Noted: 2017-07-03

## 2019-02-19 LAB
ALBUMIN SERPL-MCNC: 4.3 G/DL (ref 3.6–5.1)
ALBUMIN/GLOB SERPL: 1.2 {RATIO} (ref 1–2.4)
ALP SERPL-CCNC: 107 UNITS/L (ref 45–117)
ALT SERPL-CCNC: 23 UNITS/L
ANION GAP SERPL CALC-SCNC: 12 MMOL/L (ref 10–20)
AST SERPL-CCNC: 18 UNITS/L
BASOPHILS # BLD AUTO: 0 K/MCL (ref 0–0.3)
BASOPHILS NFR BLD AUTO: 0 %
BILIRUB SERPL-MCNC: 0.6 MG/DL (ref 0.2–1)
BUN SERPL-MCNC: 15 MG/DL (ref 6–20)
BUN/CREAT SERPL: 17 (ref 7–25)
CALCIUM SERPL-MCNC: 9.8 MG/DL (ref 8.4–10.2)
CHLORIDE SERPL-SCNC: 107 MMOL/L (ref 98–107)
CO2 SERPL-SCNC: 27 MMOL/L (ref 21–32)
CREAT SERPL-MCNC: 0.9 MG/DL (ref 0.51–0.95)
DIFFERENTIAL METHOD BLD: ABNORMAL
EOSINOPHIL # BLD AUTO: 0.1 K/MCL (ref 0.1–0.5)
EOSINOPHIL NFR SPEC: 1 %
ERYTHROCYTE [DISTWIDTH] IN BLOOD: 12.4 % (ref 11–15)
FASTING STATUS PATIENT QL REPORTED: NORMAL HRS
GLOBULIN SER-MCNC: 3.5 G/DL (ref 2–4)
GLUCOSE SERPL-MCNC: 93 MG/DL (ref 65–99)
HCT VFR BLD CALC: 39.7 % (ref 36–46.5)
HDLC SERPL-MCNC: 55 MG/DL
HGB BLD-MCNC: 12.4 G/DL (ref 12–15.5)
IMM GRANULOCYTES # BLD AUTO: 0 K/MCL (ref 0–0.2)
IMM GRANULOCYTES NFR BLD: 0 %
LDLC SERPL DIRECT ASSAY-MCNC: 114 MG/DL
LYMPHOCYTES # BLD MANUAL: 3.3 K/MCL (ref 1–4)
LYMPHOCYTES NFR BLD MANUAL: 31 %
MCH RBC QN AUTO: 31.1 PG (ref 26–34)
MCHC RBC AUTO-ENTMCNC: 31.2 G/DL (ref 32–36.5)
MCV RBC AUTO: 99.5 FL (ref 78–100)
MONOCYTES # BLD MANUAL: 0.9 K/MCL (ref 0.3–0.9)
MONOCYTES NFR BLD MANUAL: 9 %
NEUTROPHILS # BLD: 6.1 K/MCL (ref 1.8–7.7)
NEUTROPHILS NFR BLD AUTO: 59 %
NRBC BLD MANUAL-RTO: 0 /100 WBC
PLATELET # BLD: 386 K/MCL (ref 140–450)
POTASSIUM SERPL-SCNC: 4 MMOL/L (ref 3.4–5.1)
PROT SERPL-MCNC: 7.8 G/DL (ref 6.4–8.2)
RBC # BLD: 3.99 MIL/MCL (ref 4–5.2)
SODIUM SERPL-SCNC: 142 MMOL/L (ref 135–145)
WBC # BLD: 10.4 K/MCL (ref 4.2–11)

## 2019-02-19 PROCEDURE — 36415 COLL VENOUS BLD VENIPUNCTURE: CPT

## 2019-02-19 PROCEDURE — 85025 COMPLETE CBC W/AUTO DIFF WBC: CPT | Performed by: INTERNAL MEDICINE

## 2019-02-19 PROCEDURE — 83718 ASSAY OF LIPOPROTEIN: CPT | Performed by: INTERNAL MEDICINE

## 2019-02-19 PROCEDURE — 80053 COMPREHEN METABOLIC PANEL: CPT | Performed by: INTERNAL MEDICINE

## 2019-02-19 PROCEDURE — 83721 ASSAY OF BLOOD LIPOPROTEIN: CPT | Performed by: INTERNAL MEDICINE

## 2019-02-19 PROCEDURE — 99214 OFFICE O/P EST MOD 30 MIN: CPT | Performed by: INTERNAL MEDICINE

## 2019-02-19 PROCEDURE — 83036 HEMOGLOBIN GLYCOSYLATED A1C: CPT | Performed by: INTERNAL MEDICINE

## 2019-02-19 RX ORDER — DILTIAZEM HYDROCHLORIDE 180 MG/1
180 CAPSULE, COATED, EXTENDED RELEASE ORAL DAILY
Qty: 90 CAPSULE | Refills: 2 | Status: SHIPPED | OUTPATIENT
Start: 2019-02-19 | End: 2019-04-19 | Stop reason: SDUPTHER

## 2019-02-19 RX ORDER — HYDROXYZINE HYDROCHLORIDE 25 MG/1
25 TABLET, FILM COATED ORAL DAILY
COMMUNITY
Start: 2018-04-17 | End: 2019-04-17

## 2019-02-19 RX ORDER — PRAVASTATIN SODIUM 40 MG
TABLET ORAL
COMMUNITY
Start: 2018-06-28 | End: 2019-02-19 | Stop reason: SDUPTHER

## 2019-02-19 RX ORDER — OMEPRAZOLE 40 MG/1
40 CAPSULE, DELAYED RELEASE ORAL DAILY
Qty: 90 CAPSULE | Refills: 1 | Status: SHIPPED | OUTPATIENT
Start: 2019-02-19 | End: 2019-08-21 | Stop reason: SDUPTHER

## 2019-02-19 RX ORDER — NAPROXEN 500 MG/1
500 TABLET ORAL DAILY
COMMUNITY
Start: 2018-09-27 | End: 2019-05-21 | Stop reason: ALTCHOICE

## 2019-02-19 RX ORDER — MAGNESIUM GLUCONATE 30 MG(550)
TABLET ORAL
COMMUNITY
End: 2021-04-29 | Stop reason: CLARIF

## 2019-02-19 RX ORDER — MONTELUKAST SODIUM 10 MG/1
10 TABLET ORAL NIGHTLY
Qty: 90 TABLET | Refills: 2 | Status: SHIPPED | OUTPATIENT
Start: 2019-02-19 | End: 2020-02-08 | Stop reason: SDUPTHER

## 2019-02-19 RX ORDER — MULTIVIT WITH MINERALS/LUTEIN
1000 TABLET ORAL
COMMUNITY

## 2019-02-19 SDOH — HEALTH STABILITY: PHYSICAL HEALTH: ON AVERAGE, HOW MANY MINUTES DO YOU ENGAGE IN EXERCISE AT THIS LEVEL?: 0 MIN

## 2019-02-19 SDOH — HEALTH STABILITY: PHYSICAL HEALTH: ON AVERAGE, HOW MANY DAYS PER WEEK DO YOU ENGAGE IN MODERATE TO STRENUOUS EXERCISE (LIKE A BRISK WALK)?: 0 DAYS

## 2019-02-19 SDOH — HEALTH STABILITY: MENTAL HEALTH: HOW OFTEN DO YOU HAVE A DRINK CONTAINING ALCOHOL?: NEVER

## 2019-02-19 ASSESSMENT — PATIENT HEALTH QUESTIONNAIRE - PHQ9
SUM OF ALL RESPONSES TO PHQ9 QUESTIONS 1 AND 2: 0
2. FEELING DOWN, DEPRESSED OR HOPELESS: NOT AT ALL
1. LITTLE INTEREST OR PLEASURE IN DOING THINGS: NOT AT ALL
SUM OF ALL RESPONSES TO PHQ9 QUESTIONS 1 AND 2: 0

## 2019-02-19 ASSESSMENT — ENCOUNTER SYMPTOMS
POLYDIPSIA: 0
DIZZINESS: 0
VOMITING: 0
PSYCHIATRIC NEGATIVE: 1
EYES NEGATIVE: 1
RESPIRATORY NEGATIVE: 1
UNEXPECTED WEIGHT CHANGE: 1
ABDOMINAL PAIN: 1
FATIGUE: 0
NAUSEA: 0
CHILLS: 0
HEADACHES: 0

## 2019-02-19 ASSESSMENT — COGNITIVE AND FUNCTIONAL STATUS - GENERAL
BECAUSE OF A PHYSICAL, MENTAL, OR EMOTIONAL CONDITION, DO YOU HAVE DIFFICULTY DOING ERRANDS ALONE: NO
DO YOU HAVE SERIOUS DIFFICULTY WALKING OR CLIMBING STAIRS: NO
BECAUSE OF A PHYSICAL, MENTAL, OR EMOTIONAL CONDITION, DO YOU HAVE SERIOUS DIFFICULTY CONCENTRATING, REMEMBERING OR MAKING DECISIONS: NO
DO YOU HAVE DIFFICULTY DRESSING OR BATHING: NO

## 2019-02-20 LAB
BUN: 12 MG/DL
CALCIUM: 10 MG/DL
CHLORIDE: 103 MEQ/L
CREATININE, SERUM: 1 MG/DL
GLUCOSE: 147 MG/DL
HBA1C MFR BLD: 6.3 % (ref 4.5–5.6)
HEMATOCRIT: 39.7 %
HEMOGLOBIN: 12.8 G/DL
PLATELETS: 397 K/UL
POTASSIUM, SERUM: 3.8 MEQ/L
RED BLOOD COUNT: 3.98 X 10-6/U
SODIUM: 139 MEQ/L
WHITE BLOOD COUNT: 9.1 X 10-3/U

## 2019-02-22 ENCOUNTER — PRIOR ORIGINAL RECORDS (OUTPATIENT)
Dept: OTHER | Age: 69
End: 2019-02-22

## 2019-02-28 VITALS
HEART RATE: 94 BPM | OXYGEN SATURATION: 97 % | BODY MASS INDEX: 43.4 KG/M2 | HEIGHT: 69 IN | WEIGHT: 293 LBS | SYSTOLIC BLOOD PRESSURE: 124 MMHG | DIASTOLIC BLOOD PRESSURE: 64 MMHG

## 2019-03-01 ENCOUNTER — TELEPHONE (OUTPATIENT)
Dept: SCHEDULING | Age: 69
End: 2019-03-01

## 2019-03-07 VITALS
BODY MASS INDEX: 42.95 KG/M2 | HEIGHT: 69 IN | SYSTOLIC BLOOD PRESSURE: 130 MMHG | DIASTOLIC BLOOD PRESSURE: 70 MMHG | WEIGHT: 290 LBS | HEART RATE: 84 BPM

## 2019-03-12 ENCOUNTER — TELEPHONE (OUTPATIENT)
Dept: SCHEDULING | Age: 69
End: 2019-03-12

## 2019-03-12 RX ORDER — LOSARTAN POTASSIUM 100 MG/1
100 TABLET ORAL DAILY
COMMUNITY
Start: 2019-02-07 | End: 2019-05-21 | Stop reason: ALTCHOICE

## 2019-03-12 RX ORDER — MULTIVIT-MIN/IRON/FOLIC ACID/K 18-600-40
1000 CAPSULE ORAL
COMMUNITY
Start: 2019-01-03

## 2019-03-14 ENCOUNTER — TELEPHONE (OUTPATIENT)
Dept: SCHEDULING | Age: 69
End: 2019-03-14

## 2019-03-22 RX ORDER — FLUTICASONE FUROATE AND VILANTEROL TRIFENATATE 200; 25 UG/1; UG/1
POWDER RESPIRATORY (INHALATION)
Qty: 3 EACH | Refills: 2 | Status: SHIPPED | OUTPATIENT
Start: 2019-03-22 | End: 2020-06-19

## 2019-03-28 ENCOUNTER — OFFICE VISIT (OUTPATIENT)
Dept: CARDIOLOGY | Age: 69
End: 2019-03-28

## 2019-03-28 DIAGNOSIS — I25.10 CORONARY ARTERIOSCLEROSIS: Primary | ICD-10-CM

## 2019-03-28 DIAGNOSIS — E11.9 CONTROLLED TYPE 2 DIABETES MELLITUS WITHOUT COMPLICATION, WITHOUT LONG-TERM CURRENT USE OF INSULIN (CMD): ICD-10-CM

## 2019-03-28 DIAGNOSIS — I10 ESSENTIAL HYPERTENSION: ICD-10-CM

## 2019-03-28 DIAGNOSIS — E78.2 MIXED HYPERLIPIDEMIA: ICD-10-CM

## 2019-03-28 DIAGNOSIS — G62.9 NEUROPATHY: ICD-10-CM

## 2019-03-28 DIAGNOSIS — R06.02 SOB (SHORTNESS OF BREATH): ICD-10-CM

## 2019-03-28 DIAGNOSIS — R00.2 PALPITATIONS: ICD-10-CM

## 2019-03-28 PROCEDURE — 3077F SYST BP >= 140 MM HG: CPT | Performed by: INTERNAL MEDICINE

## 2019-03-28 PROCEDURE — 99214 OFFICE O/P EST MOD 30 MIN: CPT | Performed by: INTERNAL MEDICINE

## 2019-03-28 PROCEDURE — 3078F DIAST BP <80 MM HG: CPT | Performed by: INTERNAL MEDICINE

## 2019-03-28 RX ORDER — HYDROCHLOROTHIAZIDE 12.5 MG/1
25 CAPSULE, GELATIN COATED ORAL DAILY
Qty: 60 CAPSULE | Refills: 3 | Status: SHIPPED | OUTPATIENT
Start: 2019-03-28 | End: 2019-05-22 | Stop reason: SDUPTHER

## 2019-03-28 ASSESSMENT — PAIN SCALES - GENERAL: PAINLEVEL: 0

## 2019-04-10 ENCOUNTER — TELEPHONE (OUTPATIENT)
Dept: SCHEDULING | Age: 69
End: 2019-04-10

## 2019-04-15 PROBLEM — G56.00 CARPAL TUNNEL SYNDROME: Status: RESOLVED | Noted: 2018-07-26 | Resolved: 2019-04-15

## 2019-04-15 PROBLEM — M79.7 FIBROMYALGIA: Status: RESOLVED | Noted: 2017-07-03 | Resolved: 2019-04-15

## 2019-04-15 PROBLEM — R15.9 ENCOPRESIS: Status: RESOLVED | Noted: 2018-07-16 | Resolved: 2019-04-15

## 2019-04-15 PROBLEM — R25.2 MUSCLE CRAMPS: Status: RESOLVED | Noted: 2018-10-16 | Resolved: 2019-04-15

## 2019-04-15 PROBLEM — H90.3 SENSORINEURAL HEARING LOSS (SNHL), BILATERAL: Status: RESOLVED | Noted: 2017-06-20 | Resolved: 2019-04-15

## 2019-04-15 PROBLEM — R00.2 PALPITATIONS: Status: RESOLVED | Noted: 2019-03-28 | Resolved: 2019-04-15

## 2019-04-16 ENCOUNTER — OFFICE VISIT (OUTPATIENT)
Dept: CARDIOLOGY | Age: 69
End: 2019-04-16

## 2019-04-16 ENCOUNTER — LAB ENCOUNTER (OUTPATIENT)
Dept: LAB | Facility: HOSPITAL | Age: 69
End: 2019-04-16
Attending: INTERNAL MEDICINE
Payer: MEDICARE

## 2019-04-16 VITALS
OXYGEN SATURATION: 97 % | DIASTOLIC BLOOD PRESSURE: 70 MMHG | BODY MASS INDEX: 41.95 KG/M2 | SYSTOLIC BLOOD PRESSURE: 130 MMHG | WEIGHT: 293 LBS | HEART RATE: 80 BPM | HEIGHT: 70 IN

## 2019-04-16 DIAGNOSIS — E78.5 DYSLIPIDEMIA: ICD-10-CM

## 2019-04-16 DIAGNOSIS — I10 ESSENTIAL HYPERTENSION: ICD-10-CM

## 2019-04-16 DIAGNOSIS — G62.9 NEUROPATHY: Primary | ICD-10-CM

## 2019-04-16 DIAGNOSIS — I10 BENIGN ESSENTIAL HYPERTENSION: Primary | ICD-10-CM

## 2019-04-16 PROCEDURE — 36415 COLL VENOUS BLD VENIPUNCTURE: CPT

## 2019-04-16 PROCEDURE — 3078F DIAST BP <80 MM HG: CPT | Performed by: NURSE PRACTITIONER

## 2019-04-16 PROCEDURE — 83036 HEMOGLOBIN GLYCOSYLATED A1C: CPT

## 2019-04-16 PROCEDURE — 80048 BASIC METABOLIC PNL TOTAL CA: CPT

## 2019-04-16 PROCEDURE — 82607 VITAMIN B-12: CPT

## 2019-04-16 PROCEDURE — 82746 ASSAY OF FOLIC ACID SERUM: CPT

## 2019-04-16 PROCEDURE — 99214 OFFICE O/P EST MOD 30 MIN: CPT | Performed by: NURSE PRACTITIONER

## 2019-04-16 PROCEDURE — 3075F SYST BP GE 130 - 139MM HG: CPT | Performed by: NURSE PRACTITIONER

## 2019-04-16 RX ORDER — ROSUVASTATIN CALCIUM 10 MG/1
10 TABLET, COATED ORAL DAILY
Qty: 30 TABLET | Refills: 6 | Status: SHIPPED | OUTPATIENT
Start: 2019-04-16 | End: 2019-07-08 | Stop reason: ALTCHOICE

## 2019-04-16 SDOH — HEALTH STABILITY: MENTAL HEALTH
STRESS IS WHEN SOMEONE FEELS TENSE, NERVOUS, ANXIOUS, OR CAN'T SLEEP AT NIGHT BECAUSE THEIR MIND IS TROUBLED. HOW STRESSED ARE YOU?: NOT AT ALL

## 2019-04-16 SDOH — HEALTH STABILITY: MENTAL HEALTH: HOW OFTEN DO YOU HAVE A DRINK CONTAINING ALCOHOL?: NEVER

## 2019-04-16 SDOH — HEALTH STABILITY: PHYSICAL HEALTH: ON AVERAGE, HOW MANY MINUTES DO YOU ENGAGE IN EXERCISE AT THIS LEVEL?: 0 MIN

## 2019-04-16 SDOH — HEALTH STABILITY: PHYSICAL HEALTH: ON AVERAGE, HOW MANY DAYS PER WEEK DO YOU ENGAGE IN MODERATE TO STRENUOUS EXERCISE (LIKE A BRISK WALK)?: 0 DAYS

## 2019-04-17 ENCOUNTER — TELEPHONE (OUTPATIENT)
Dept: CARDIOLOGY | Age: 69
End: 2019-04-17

## 2019-04-19 DIAGNOSIS — I10 BENIGN ESSENTIAL HYPERTENSION: ICD-10-CM

## 2019-04-20 RX ORDER — DILTIAZEM HYDROCHLORIDE 180 MG/1
CAPSULE, EXTENDED RELEASE ORAL
Qty: 90 CAPSULE | Refills: 1 | Status: SHIPPED | OUTPATIENT
Start: 2019-04-20 | End: 2019-08-21 | Stop reason: SDUPTHER

## 2019-04-29 ENCOUNTER — TELEPHONE (OUTPATIENT)
Dept: SCHEDULING | Age: 69
End: 2019-04-29

## 2019-05-10 RX ORDER — FLUTICASONE PROPIONATE 50 MCG
SPRAY, SUSPENSION (ML) NASAL
Qty: 1 INHALER | Refills: 5 | Status: SHIPPED | OUTPATIENT
Start: 2019-05-10 | End: 2020-08-12 | Stop reason: SDUPTHER

## 2019-05-21 ENCOUNTER — OFFICE VISIT (OUTPATIENT)
Dept: INTERNAL MEDICINE | Age: 69
End: 2019-05-21

## 2019-05-21 VITALS
TEMPERATURE: 98.2 F | WEIGHT: 293 LBS | SYSTOLIC BLOOD PRESSURE: 152 MMHG | BODY MASS INDEX: 41.95 KG/M2 | DIASTOLIC BLOOD PRESSURE: 86 MMHG | RESPIRATION RATE: 16 BRPM | HEART RATE: 82 BPM | HEIGHT: 70 IN

## 2019-05-21 DIAGNOSIS — E78.5 DYSLIPIDEMIA: ICD-10-CM

## 2019-05-21 DIAGNOSIS — E11.9 CONTROLLED TYPE 2 DIABETES MELLITUS WITHOUT COMPLICATION, WITHOUT LONG-TERM CURRENT USE OF INSULIN (CMD): Primary | ICD-10-CM

## 2019-05-21 DIAGNOSIS — E66.01 MORBID OBESITY (CMD): ICD-10-CM

## 2019-05-21 DIAGNOSIS — J45.20 MILD INTERMITTENT ASTHMA WITHOUT COMPLICATION: ICD-10-CM

## 2019-05-21 DIAGNOSIS — I25.10 CORONARY ARTERIOSCLEROSIS: ICD-10-CM

## 2019-05-21 DIAGNOSIS — I10 BENIGN ESSENTIAL HYPERTENSION: ICD-10-CM

## 2019-05-21 DIAGNOSIS — M25.552 PAIN OF LEFT HIP JOINT: ICD-10-CM

## 2019-05-21 PROBLEM — M94.0 COSTOCHONDRITIS: Status: RESOLVED | Noted: 2018-04-17 | Resolved: 2019-05-21

## 2019-05-21 PROBLEM — R93.1 ABNORMAL ECHOCARDIOGRAM: Status: ACTIVE | Noted: 2019-01-30

## 2019-05-21 PROBLEM — R06.02 SOB (SHORTNESS OF BREATH): Status: RESOLVED | Noted: 2019-03-28 | Resolved: 2019-05-21

## 2019-05-21 PROBLEM — R94.31 ABNORMAL EKG: Status: ACTIVE | Noted: 2019-01-03

## 2019-05-21 PROCEDURE — 99214 OFFICE O/P EST MOD 30 MIN: CPT | Performed by: INTERNAL MEDICINE

## 2019-05-21 RX ORDER — METFORMIN HYDROCHLORIDE EXTENDED-RELEASE TABLETS 1000 MG/1
1000 TABLET, FILM COATED, EXTENDED RELEASE ORAL
Qty: 90 TABLET | Refills: 1 | Status: SHIPPED | OUTPATIENT
Start: 2019-05-21 | End: 2019-08-21

## 2019-05-21 SDOH — HEALTH STABILITY: PHYSICAL HEALTH: ON AVERAGE, HOW MANY MINUTES DO YOU ENGAGE IN EXERCISE AT THIS LEVEL?: 0 MIN

## 2019-05-21 SDOH — HEALTH STABILITY: MENTAL HEALTH: HOW OFTEN DO YOU HAVE A DRINK CONTAINING ALCOHOL?: NEVER

## 2019-05-21 SDOH — HEALTH STABILITY: PHYSICAL HEALTH: ON AVERAGE, HOW MANY DAYS PER WEEK DO YOU ENGAGE IN MODERATE TO STRENUOUS EXERCISE (LIKE A BRISK WALK)?: 0 DAYS

## 2019-05-21 ASSESSMENT — ENCOUNTER SYMPTOMS
BACK PAIN: 1
RESPIRATORY NEGATIVE: 1
POLYDIPSIA: 0
GASTROINTESTINAL NEGATIVE: 1
DIZZINESS: 0
HEADACHES: 0
PSYCHIATRIC NEGATIVE: 1
CONSTITUTIONAL NEGATIVE: 1

## 2019-05-21 ASSESSMENT — PATIENT HEALTH QUESTIONNAIRE - PHQ9
1. LITTLE INTEREST OR PLEASURE IN DOING THINGS: NOT AT ALL
SUM OF ALL RESPONSES TO PHQ9 QUESTIONS 1 AND 2: 0
SUM OF ALL RESPONSES TO PHQ9 QUESTIONS 1 AND 2: 0
2. FEELING DOWN, DEPRESSED OR HOPELESS: NOT AT ALL
SUM OF ALL RESPONSES TO PHQ9 QUESTIONS 1 AND 2: 0
1. LITTLE INTEREST OR PLEASURE IN DOING THINGS: NOT AT ALL
2. FEELING DOWN, DEPRESSED OR HOPELESS: NOT AT ALL

## 2019-05-22 RX ORDER — HYDROCHLOROTHIAZIDE 12.5 MG/1
CAPSULE, GELATIN COATED ORAL
Qty: 90 CAPSULE | Refills: 1 | Status: SHIPPED | OUTPATIENT
Start: 2019-05-22 | End: 2019-12-23 | Stop reason: SDUPTHER

## 2019-05-23 ENCOUNTER — TELEPHONE (OUTPATIENT)
Dept: SCHEDULING | Age: 69
End: 2019-05-23

## 2019-05-23 ENCOUNTER — TELEPHONE (OUTPATIENT)
Dept: CARDIOLOGY | Age: 69
End: 2019-05-23

## 2019-05-23 RX ORDER — VALSARTAN 160 MG/1
160 TABLET ORAL DAILY
Qty: 90 TABLET | Refills: 1 | Status: SHIPPED | OUTPATIENT
Start: 2019-05-23 | End: 2019-12-23 | Stop reason: SDUPTHER

## 2019-05-24 ENCOUNTER — DOCUMENTATION (OUTPATIENT)
Dept: INTERNAL MEDICINE | Age: 69
End: 2019-05-24

## 2019-06-13 ENCOUNTER — TELEPHONE (OUTPATIENT)
Dept: SCHEDULING | Age: 69
End: 2019-06-13

## 2019-06-13 DIAGNOSIS — E78.5 DYSLIPIDEMIA: Primary | ICD-10-CM

## 2019-06-28 ENCOUNTER — TELEPHONE (OUTPATIENT)
Dept: CARDIOLOGY | Age: 69
End: 2019-06-28

## 2019-07-02 ENCOUNTER — TELEPHONE (OUTPATIENT)
Dept: SCHEDULING | Age: 69
End: 2019-07-02

## 2019-07-02 DIAGNOSIS — Z12.39 SCREENING FOR MALIGNANT NEOPLASM OF BREAST: Primary | ICD-10-CM

## 2019-07-08 RX ORDER — PRAVASTATIN SODIUM 20 MG
20 TABLET ORAL DAILY
Qty: 30 TABLET | Refills: 1 | Status: SHIPPED | OUTPATIENT
Start: 2019-07-08 | End: 2019-08-23 | Stop reason: SDUPTHER

## 2019-07-15 DIAGNOSIS — Z12.39 SCREENING FOR MALIGNANT NEOPLASM OF BREAST: ICD-10-CM

## 2019-07-20 RX ORDER — FENOFIBRATE 200 MG/1
CAPSULE ORAL
Qty: 90 CAPSULE | Refills: 1 | Status: SHIPPED | OUTPATIENT
Start: 2019-07-20 | End: 2019-08-21 | Stop reason: ALTCHOICE

## 2019-08-21 ENCOUNTER — OFFICE VISIT (OUTPATIENT)
Dept: INTERNAL MEDICINE | Age: 69
End: 2019-08-21

## 2019-08-21 VITALS
RESPIRATION RATE: 22 BRPM | HEART RATE: 93 BPM | HEIGHT: 70 IN | WEIGHT: 287.04 LBS | DIASTOLIC BLOOD PRESSURE: 80 MMHG | SYSTOLIC BLOOD PRESSURE: 140 MMHG | BODY MASS INDEX: 41.09 KG/M2 | TEMPERATURE: 98.6 F

## 2019-08-21 DIAGNOSIS — E66.01 MORBID OBESITY (CMD): ICD-10-CM

## 2019-08-21 DIAGNOSIS — Z13.820 SCREENING FOR OSTEOPOROSIS: ICD-10-CM

## 2019-08-21 DIAGNOSIS — E11.9 CONTROLLED TYPE 2 DIABETES MELLITUS WITHOUT COMPLICATION, WITHOUT LONG-TERM CURRENT USE OF INSULIN (CMD): Primary | ICD-10-CM

## 2019-08-21 DIAGNOSIS — E78.5 DYSLIPIDEMIA: ICD-10-CM

## 2019-08-21 DIAGNOSIS — M85.89 OTHER SPECIFIED DISORDERS OF BONE DENSITY AND STRUCTURE, MULTIPLE SITES: ICD-10-CM

## 2019-08-21 DIAGNOSIS — J45.20 MILD INTERMITTENT ASTHMA WITHOUT COMPLICATION: ICD-10-CM

## 2019-08-21 DIAGNOSIS — I10 BENIGN ESSENTIAL HYPERTENSION: ICD-10-CM

## 2019-08-21 DIAGNOSIS — K21.9 GASTROESOPHAGEAL REFLUX DISEASE WITHOUT ESOPHAGITIS: ICD-10-CM

## 2019-08-21 PROBLEM — I49.8 BIGEMINY: Status: RESOLVED | Noted: 2018-11-28 | Resolved: 2019-08-21

## 2019-08-21 PROBLEM — R93.1 ABNORMAL ECHOCARDIOGRAM: Status: RESOLVED | Noted: 2019-01-30 | Resolved: 2019-08-21

## 2019-08-21 PROBLEM — R94.31 ABNORMAL EKG: Status: RESOLVED | Noted: 2019-01-03 | Resolved: 2019-08-21

## 2019-08-21 PROCEDURE — 99214 OFFICE O/P EST MOD 30 MIN: CPT | Performed by: INTERNAL MEDICINE

## 2019-08-21 RX ORDER — DILTIAZEM HYDROCHLORIDE 180 MG/1
180 CAPSULE, COATED, EXTENDED RELEASE ORAL DAILY
Qty: 90 CAPSULE | Refills: 3 | Status: SHIPPED | OUTPATIENT
Start: 2019-08-21 | End: 2021-01-01

## 2019-08-21 RX ORDER — OMEPRAZOLE 40 MG/1
40 CAPSULE, DELAYED RELEASE ORAL DAILY
Qty: 90 CAPSULE | Refills: 1 | Status: SHIPPED | OUTPATIENT
Start: 2019-08-21 | End: 2019-09-16 | Stop reason: SDUPTHER

## 2019-08-21 RX ORDER — FLUTICASONE PROPIONATE AND SALMETEROL XINAFOATE 115; 21 UG/1; UG/1
AEROSOL, METERED RESPIRATORY (INHALATION)
COMMUNITY
End: 2019-11-20

## 2019-08-21 ASSESSMENT — ENCOUNTER SYMPTOMS
PSYCHIATRIC NEGATIVE: 1
CONSTITUTIONAL NEGATIVE: 1
EYES NEGATIVE: 1
POLYDIPSIA: 0
GASTROINTESTINAL NEGATIVE: 1
RESPIRATORY NEGATIVE: 1
BACK PAIN: 1
NEUROLOGICAL NEGATIVE: 1

## 2019-08-21 ASSESSMENT — PATIENT HEALTH QUESTIONNAIRE - PHQ9
2. FEELING DOWN, DEPRESSED OR HOPELESS: NOT AT ALL
SUM OF ALL RESPONSES TO PHQ9 QUESTIONS 1 AND 2: 0
SUM OF ALL RESPONSES TO PHQ9 QUESTIONS 1 AND 2: 0
1. LITTLE INTEREST OR PLEASURE IN DOING THINGS: NOT AT ALL

## 2019-08-23 ENCOUNTER — TELEPHONE (OUTPATIENT)
Dept: CARDIOLOGY | Age: 69
End: 2019-08-23

## 2019-08-23 RX ORDER — PRAVASTATIN SODIUM 20 MG
TABLET ORAL
Qty: 30 TABLET | Refills: 3 | Status: SHIPPED | OUTPATIENT
Start: 2019-08-23 | End: 2019-11-20 | Stop reason: DRUGHIGH

## 2019-08-23 RX ORDER — PRAVASTATIN SODIUM 20 MG
20 TABLET ORAL DAILY
Qty: 90 TABLET | Refills: 0 | Status: SHIPPED | OUTPATIENT
Start: 2019-08-23 | End: 2019-08-23 | Stop reason: SDUPTHER

## 2019-08-23 RX ORDER — PRAVASTATIN SODIUM 20 MG
20 TABLET ORAL DAILY
Qty: 90 TABLET | Refills: 0 | Status: SHIPPED | OUTPATIENT
Start: 2019-08-23 | End: 2019-11-20

## 2019-09-10 ENCOUNTER — TELEPHONE (OUTPATIENT)
Dept: SCHEDULING | Age: 69
End: 2019-09-10

## 2019-09-13 DIAGNOSIS — E11.9 CONTROLLED TYPE 2 DIABETES MELLITUS WITHOUT COMPLICATION, WITHOUT LONG-TERM CURRENT USE OF INSULIN (CMD): ICD-10-CM

## 2019-09-13 DIAGNOSIS — K21.9 GASTROESOPHAGEAL REFLUX DISEASE WITHOUT ESOPHAGITIS: ICD-10-CM

## 2019-09-16 RX ORDER — OMEPRAZOLE 40 MG/1
CAPSULE, DELAYED RELEASE ORAL
Qty: 90 CAPSULE | Refills: 1 | Status: SHIPPED | OUTPATIENT
Start: 2019-09-16 | End: 2020-09-25 | Stop reason: SDUPTHER

## 2019-09-17 RX ORDER — PRAVASTATIN SODIUM 20 MG
TABLET ORAL
Qty: 30 TABLET | Refills: 10 | Status: SHIPPED | OUTPATIENT
Start: 2019-09-17 | End: 2019-11-20 | Stop reason: DRUGHIGH

## 2019-10-17 ENCOUNTER — LAB ENCOUNTER (OUTPATIENT)
Dept: LAB | Facility: HOSPITAL | Age: 69
End: 2019-10-17
Attending: INTERNAL MEDICINE
Payer: MEDICARE

## 2019-10-17 DIAGNOSIS — E78.5 DYSLIPIDEMIA: Primary | ICD-10-CM

## 2019-10-17 LAB
ALT SERPL-CCNC: 26 U/L
AST SERPL-CCNC: 22 U/L
CHOLEST SERPL-MCNC: 190 MG/DL
CHOLEST/HDLC SERPL: NORMAL {RATIO}
HDLC SERPL-MCNC: 60 MG/DL
LDLC SERPL CALC-MCNC: 113 MG/DL
LENGTH OF FAST TIME PATIENT: YES H
NONHDLC SERPL-MCNC: 130 MG/DL
TRIGL SERPL-MCNC: 83 MG/DL
VLDLC SERPL CALC-MCNC: 17 MG/DL

## 2019-10-17 PROCEDURE — 36415 COLL VENOUS BLD VENIPUNCTURE: CPT

## 2019-10-17 PROCEDURE — 84450 TRANSFERASE (AST) (SGOT): CPT

## 2019-10-17 PROCEDURE — 80061 LIPID PANEL: CPT

## 2019-10-17 PROCEDURE — 84460 ALANINE AMINO (ALT) (SGPT): CPT

## 2019-10-22 ENCOUNTER — CLINICAL ABSTRACT (OUTPATIENT)
Dept: CARDIOLOGY | Age: 69
End: 2019-10-22

## 2019-11-07 ENCOUNTER — TELEPHONE (OUTPATIENT)
Dept: SCHEDULING | Age: 69
End: 2019-11-07

## 2019-11-07 RX ORDER — BLOOD SUGAR DIAGNOSTIC
STRIP MISCELLANEOUS
COMMUNITY
Start: 2019-07-31 | End: 2021-04-29 | Stop reason: CLARIF

## 2019-11-07 RX ORDER — FENOFIBRATE 200 MG/1
CAPSULE ORAL
COMMUNITY
Start: 2019-11-07 | End: 2020-02-19 | Stop reason: SDUPTHER

## 2019-11-07 RX ORDER — LANCETS 33 GAUGE
EACH MISCELLANEOUS
Refills: 4 | COMMUNITY
Start: 2019-08-07 | End: 2021-04-29 | Stop reason: CLARIF

## 2019-11-08 RX ORDER — DICLOFENAC SODIUM 75 MG/1
TABLET, DELAYED RELEASE ORAL
Qty: 60 TABLET | Refills: 5 | Status: SHIPPED | OUTPATIENT
Start: 2019-11-08 | End: 2019-11-20

## 2019-11-20 ENCOUNTER — OFFICE VISIT (OUTPATIENT)
Dept: INTERNAL MEDICINE | Age: 69
End: 2019-11-20

## 2019-11-20 ENCOUNTER — LAB SERVICES (OUTPATIENT)
Dept: LAB | Age: 69
End: 2019-11-20

## 2019-11-20 VITALS
HEART RATE: 85 BPM | TEMPERATURE: 98.6 F | BODY MASS INDEX: 41.23 KG/M2 | RESPIRATION RATE: 18 BRPM | DIASTOLIC BLOOD PRESSURE: 70 MMHG | SYSTOLIC BLOOD PRESSURE: 139 MMHG | HEIGHT: 70 IN | OXYGEN SATURATION: 97 % | WEIGHT: 288 LBS

## 2019-11-20 DIAGNOSIS — E78.5 DYSLIPIDEMIA: ICD-10-CM

## 2019-11-20 DIAGNOSIS — M85.89 OTHER SPECIFIED DISORDERS OF BONE DENSITY AND STRUCTURE, MULTIPLE SITES: ICD-10-CM

## 2019-11-20 DIAGNOSIS — E11.9 CONTROLLED TYPE 2 DIABETES MELLITUS WITHOUT COMPLICATION, WITHOUT LONG-TERM CURRENT USE OF INSULIN (CMD): ICD-10-CM

## 2019-11-20 DIAGNOSIS — M25.552 PAIN OF LEFT HIP JOINT: ICD-10-CM

## 2019-11-20 DIAGNOSIS — Z13.820 SPECIAL SCREENING FOR OSTEOPOROSIS: ICD-10-CM

## 2019-11-20 DIAGNOSIS — I10 BENIGN ESSENTIAL HYPERTENSION: ICD-10-CM

## 2019-11-20 DIAGNOSIS — G47.33 OBSTRUCTIVE SLEEP APNEA: ICD-10-CM

## 2019-11-20 DIAGNOSIS — E11.9 CONTROLLED TYPE 2 DIABETES MELLITUS WITHOUT COMPLICATION, WITHOUT LONG-TERM CURRENT USE OF INSULIN (CMD): Primary | ICD-10-CM

## 2019-11-20 DIAGNOSIS — J45.20 MILD INTERMITTENT ASTHMA WITHOUT COMPLICATION: ICD-10-CM

## 2019-11-20 DIAGNOSIS — E66.01 MORBID OBESITY (CMD): ICD-10-CM

## 2019-11-20 PROBLEM — R32 INCONTINENT OF URINE: Status: ACTIVE | Noted: 2019-11-20

## 2019-11-20 LAB — HBA1C MFR BLD: 6 % (ref 4.5–5.6)

## 2019-11-20 PROCEDURE — 36415 COLL VENOUS BLD VENIPUNCTURE: CPT

## 2019-11-20 PROCEDURE — 99214 OFFICE O/P EST MOD 30 MIN: CPT | Performed by: INTERNAL MEDICINE

## 2019-11-20 PROCEDURE — 83036 HEMOGLOBIN GLYCOSYLATED A1C: CPT | Performed by: INTERNAL MEDICINE

## 2019-11-20 RX ORDER — NAPROXEN 500 MG/1
500 TABLET ORAL 2 TIMES DAILY
Qty: 60 TABLET | Refills: 5 | Status: SHIPPED | OUTPATIENT
Start: 2019-11-20 | End: 2021-04-29 | Stop reason: CLARIF

## 2019-11-20 RX ORDER — PRAVASTATIN SODIUM 80 MG/1
80 TABLET ORAL DAILY
Qty: 90 TABLET | Refills: 3 | Status: SHIPPED | OUTPATIENT
Start: 2019-11-20 | End: 2020-02-24

## 2019-11-20 RX ORDER — ALBUTEROL SULFATE 90 UG/1
2 AEROSOL, METERED RESPIRATORY (INHALATION) EVERY 4 HOURS PRN
Qty: 1 INHALER | Refills: 6 | Status: SHIPPED | OUTPATIENT
Start: 2019-11-20 | End: 2021-01-14 | Stop reason: SDUPTHER

## 2019-11-20 SDOH — HEALTH STABILITY: MENTAL HEALTH: HOW OFTEN DO YOU HAVE A DRINK CONTAINING ALCOHOL?: NEVER

## 2019-11-20 SDOH — HEALTH STABILITY: PHYSICAL HEALTH: ON AVERAGE, HOW MANY MINUTES DO YOU ENGAGE IN EXERCISE AT THIS LEVEL?: 0 MIN

## 2019-11-20 SDOH — HEALTH STABILITY: PHYSICAL HEALTH: ON AVERAGE, HOW MANY DAYS PER WEEK DO YOU ENGAGE IN MODERATE TO STRENUOUS EXERCISE (LIKE A BRISK WALK)?: 0 DAYS

## 2019-11-20 ASSESSMENT — ENCOUNTER SYMPTOMS
RESPIRATORY NEGATIVE: 1
ENDOCRINE NEGATIVE: 1
GASTROINTESTINAL NEGATIVE: 1
HEADACHES: 0
BACK PAIN: 1
CONSTITUTIONAL NEGATIVE: 1
DIZZINESS: 0
EYES NEGATIVE: 1
PSYCHIATRIC NEGATIVE: 1

## 2019-11-21 ENCOUNTER — TELEPHONE (OUTPATIENT)
Dept: SCHEDULING | Age: 69
End: 2019-11-21

## 2019-11-25 ENCOUNTER — APPOINTMENT (OUTPATIENT)
Dept: CARDIOLOGY | Age: 69
End: 2019-11-25

## 2019-12-19 ENCOUNTER — APPOINTMENT (OUTPATIENT)
Dept: CARDIOLOGY | Age: 69
End: 2019-12-19

## 2019-12-23 ENCOUNTER — TELEPHONE (OUTPATIENT)
Dept: SCHEDULING | Age: 69
End: 2019-12-23

## 2019-12-23 RX ORDER — VALSARTAN 160 MG/1
TABLET ORAL
Qty: 90 TABLET | Refills: 0 | Status: SHIPPED | OUTPATIENT
Start: 2019-12-23 | End: 2020-03-23

## 2019-12-23 RX ORDER — HYDROCHLOROTHIAZIDE 12.5 MG/1
CAPSULE, GELATIN COATED ORAL
Qty: 90 CAPSULE | Refills: 0 | Status: SHIPPED | OUTPATIENT
Start: 2019-12-23 | End: 2020-03-23

## 2019-12-26 DIAGNOSIS — Z13.820 SCREENING FOR OSTEOPOROSIS: ICD-10-CM

## 2019-12-26 DIAGNOSIS — M85.89 OTHER SPECIFIED DISORDERS OF BONE DENSITY AND STRUCTURE, MULTIPLE SITES: ICD-10-CM

## 2019-12-30 ENCOUNTER — TELEPHONE (OUTPATIENT)
Dept: SCHEDULING | Age: 69
End: 2019-12-30

## 2019-12-30 DIAGNOSIS — M25.551 ACUTE PAIN OF BOTH HIPS: Primary | ICD-10-CM

## 2019-12-30 DIAGNOSIS — M25.552 ACUTE PAIN OF BOTH HIPS: Primary | ICD-10-CM

## 2020-01-01 ENCOUNTER — EXTERNAL RECORD (OUTPATIENT)
Dept: HEALTH INFORMATION MANAGEMENT | Facility: OTHER | Age: 70
End: 2020-01-01

## 2020-01-06 ENCOUNTER — TELEPHONE (OUTPATIENT)
Dept: SCHEDULING | Age: 70
End: 2020-01-06

## 2020-01-10 ENCOUNTER — IMAGING SERVICES (OUTPATIENT)
Dept: GENERAL RADIOLOGY | Age: 70
End: 2020-01-10

## 2020-01-10 PROCEDURE — 73523 X-RAY EXAM HIPS BI 5/> VIEWS: CPT | Performed by: INTERNAL MEDICINE

## 2020-01-15 DIAGNOSIS — R26.9 ABNORMALITY OF GAIT: Primary | ICD-10-CM

## 2020-01-20 ENCOUNTER — TELEPHONE (OUTPATIENT)
Dept: SCHEDULING | Age: 70
End: 2020-01-20

## 2020-02-08 DIAGNOSIS — J45.20 MILD INTERMITTENT ASTHMA WITHOUT COMPLICATION: ICD-10-CM

## 2020-02-08 RX ORDER — MONTELUKAST SODIUM 10 MG/1
TABLET ORAL
Qty: 90 TABLET | Refills: 0 | Status: SHIPPED | OUTPATIENT
Start: 2020-02-08 | End: 2020-05-04

## 2020-02-14 RX ORDER — FENOFIBRATE 200 MG/1
CAPSULE ORAL
Qty: 90 CAPSULE | Refills: 0 | Status: SHIPPED | OUTPATIENT
Start: 2020-02-14 | End: 2020-05-18

## 2020-02-19 ENCOUNTER — OFFICE VISIT (OUTPATIENT)
Dept: INTERNAL MEDICINE | Age: 70
End: 2020-02-19

## 2020-02-19 ENCOUNTER — LAB SERVICES (OUTPATIENT)
Dept: LAB | Age: 70
End: 2020-02-19

## 2020-02-19 VITALS
HEART RATE: 75 BPM | BODY MASS INDEX: 40.71 KG/M2 | SYSTOLIC BLOOD PRESSURE: 159 MMHG | TEMPERATURE: 98.4 F | HEIGHT: 70 IN | DIASTOLIC BLOOD PRESSURE: 77 MMHG | RESPIRATION RATE: 16 BRPM | WEIGHT: 284.39 LBS

## 2020-02-19 DIAGNOSIS — G47.33 OBSTRUCTIVE SLEEP APNEA: ICD-10-CM

## 2020-02-19 DIAGNOSIS — I10 BENIGN ESSENTIAL HYPERTENSION: ICD-10-CM

## 2020-02-19 DIAGNOSIS — E66.01 MORBID OBESITY (CMD): ICD-10-CM

## 2020-02-19 DIAGNOSIS — M25.552 PAIN OF LEFT HIP JOINT: ICD-10-CM

## 2020-02-19 DIAGNOSIS — E11.9 CONTROLLED TYPE 2 DIABETES MELLITUS WITHOUT COMPLICATION, WITHOUT LONG-TERM CURRENT USE OF INSULIN (CMD): Primary | ICD-10-CM

## 2020-02-19 DIAGNOSIS — E78.5 DYSLIPIDEMIA: ICD-10-CM

## 2020-02-19 DIAGNOSIS — J45.20 MILD INTERMITTENT ASTHMA WITHOUT COMPLICATION: ICD-10-CM

## 2020-02-19 DIAGNOSIS — E11.9 CONTROLLED TYPE 2 DIABETES MELLITUS WITHOUT COMPLICATION, WITHOUT LONG-TERM CURRENT USE OF INSULIN (CMD): ICD-10-CM

## 2020-02-19 DIAGNOSIS — I25.10 CORONARY ARTERIOSCLEROSIS: ICD-10-CM

## 2020-02-19 LAB
ALBUMIN SERPL-MCNC: 4 G/DL (ref 3.6–5.1)
ALBUMIN/GLOB SERPL: 1.1 {RATIO} (ref 1–2.4)
ALP SERPL-CCNC: 86 UNITS/L (ref 45–117)
ALT SERPL-CCNC: 24 UNITS/L
ANION GAP SERPL CALC-SCNC: 12 MMOL/L (ref 10–20)
AST SERPL-CCNC: 22 UNITS/L
BILIRUB SERPL-MCNC: 0.3 MG/DL (ref 0.2–1)
BUN SERPL-MCNC: 14 MG/DL (ref 6–20)
BUN/CREAT SERPL: 20 (ref 7–25)
CALCIUM SERPL-MCNC: 9.7 MG/DL (ref 8.4–10.2)
CHLORIDE SERPL-SCNC: 108 MMOL/L (ref 98–107)
CO2 SERPL-SCNC: 27 MMOL/L (ref 21–32)
CREAT SERPL-MCNC: 0.71 MG/DL (ref 0.51–0.95)
FASTING STATUS PATIENT QL REPORTED: ABNORMAL HRS
GLOBULIN SER-MCNC: 3.8 G/DL (ref 2–4)
GLUCOSE SERPL-MCNC: 102 MG/DL (ref 65–99)
HBA1C MFR BLD: 6.3 % (ref 4.5–5.6)
HDLC SERPL-MCNC: 63 MG/DL
LDLC SERPL DIRECT ASSAY-MCNC: 125 MG/DL
POTASSIUM SERPL-SCNC: 4.2 MMOL/L (ref 3.4–5.1)
PROT SERPL-MCNC: 7.8 G/DL (ref 6.4–8.2)
SODIUM SERPL-SCNC: 143 MMOL/L (ref 135–145)

## 2020-02-19 PROCEDURE — 36415 COLL VENOUS BLD VENIPUNCTURE: CPT

## 2020-02-19 PROCEDURE — 83721 ASSAY OF BLOOD LIPOPROTEIN: CPT | Performed by: INTERNAL MEDICINE

## 2020-02-19 PROCEDURE — 82043 UR ALBUMIN QUANTITATIVE: CPT | Performed by: INTERNAL MEDICINE

## 2020-02-19 PROCEDURE — 3077F SYST BP >= 140 MM HG: CPT | Performed by: INTERNAL MEDICINE

## 2020-02-19 PROCEDURE — 99214 OFFICE O/P EST MOD 30 MIN: CPT | Performed by: INTERNAL MEDICINE

## 2020-02-19 PROCEDURE — 83718 ASSAY OF LIPOPROTEIN: CPT | Performed by: INTERNAL MEDICINE

## 2020-02-19 PROCEDURE — 80053 COMPREHEN METABOLIC PANEL: CPT | Performed by: INTERNAL MEDICINE

## 2020-02-19 PROCEDURE — 83036 HEMOGLOBIN GLYCOSYLATED A1C: CPT | Performed by: INTERNAL MEDICINE

## 2020-02-19 PROCEDURE — 3078F DIAST BP <80 MM HG: CPT | Performed by: INTERNAL MEDICINE

## 2020-02-19 SDOH — HEALTH STABILITY: PHYSICAL HEALTH: ON AVERAGE, HOW MANY MINUTES DO YOU ENGAGE IN EXERCISE AT THIS LEVEL?: 0 MIN

## 2020-02-19 SDOH — HEALTH STABILITY: MENTAL HEALTH: HOW OFTEN DO YOU HAVE A DRINK CONTAINING ALCOHOL?: NEVER

## 2020-02-19 SDOH — HEALTH STABILITY: PHYSICAL HEALTH: ON AVERAGE, HOW MANY DAYS PER WEEK DO YOU ENGAGE IN MODERATE TO STRENUOUS EXERCISE (LIKE A BRISK WALK)?: 0 DAYS

## 2020-02-19 ASSESSMENT — PATIENT HEALTH QUESTIONNAIRE - PHQ9
1. LITTLE INTEREST OR PLEASURE IN DOING THINGS: NOT AT ALL
SUM OF ALL RESPONSES TO PHQ9 QUESTIONS 1 AND 2: 0
2. FEELING DOWN, DEPRESSED OR HOPELESS: NOT AT ALL
SUM OF ALL RESPONSES TO PHQ9 QUESTIONS 1 AND 2: 0

## 2020-02-19 ASSESSMENT — COGNITIVE AND FUNCTIONAL STATUS - GENERAL
BECAUSE OF A PHYSICAL, MENTAL, OR EMOTIONAL CONDITION, DO YOU HAVE DIFFICULTY DOING ERRANDS ALONE: NO
BECAUSE OF A PHYSICAL, MENTAL, OR EMOTIONAL CONDITION, DO YOU HAVE SERIOUS DIFFICULTY CONCENTRATING, REMEMBERING OR MAKING DECISIONS: NO
DO YOU HAVE DIFFICULTY DRESSING OR BATHING: NO
DO YOU HAVE SERIOUS DIFFICULTY WALKING OR CLIMBING STAIRS: NO

## 2020-02-19 ASSESSMENT — ENCOUNTER SYMPTOMS
CONSTITUTIONAL NEGATIVE: 1
ENDOCRINE NEGATIVE: 1
POLYDIPSIA: 0
ALLERGIC/IMMUNOLOGIC NEGATIVE: 1
DIZZINESS: 0
SHORTNESS OF BREATH: 0
BACK PAIN: 1
EYES NEGATIVE: 1
HEADACHES: 0
GASTROINTESTINAL NEGATIVE: 1
PSYCHIATRIC NEGATIVE: 1
RESPIRATORY NEGATIVE: 1
WHEEZING: 0
HEMATOLOGIC/LYMPHATIC NEGATIVE: 1
NEUROLOGICAL NEGATIVE: 1

## 2020-02-20 LAB
CREAT UR-MCNC: 155 MG/DL
MICROALBUMIN UR-MCNC: 1.87 MG/DL
MICROALBUMIN/CREAT UR: 12.1 MG/G

## 2020-02-21 ENCOUNTER — TELEPHONE (OUTPATIENT)
Dept: INTERNAL MEDICINE | Age: 70
End: 2020-02-21

## 2020-02-21 DIAGNOSIS — E78.5 DYSLIPIDEMIA: Primary | ICD-10-CM

## 2020-02-24 RX ORDER — ROSUVASTATIN CALCIUM 20 MG/1
20 TABLET, COATED ORAL DAILY
Qty: 30 TABLET | Refills: 3 | Status: SHIPPED | OUTPATIENT
Start: 2020-02-24 | End: 2020-06-03 | Stop reason: SINTOL

## 2020-02-27 ENCOUNTER — TELEPHONE (OUTPATIENT)
Dept: SCHEDULING | Age: 70
End: 2020-02-27

## 2020-03-18 ENCOUNTER — TELEPHONE (OUTPATIENT)
Dept: SCHEDULING | Age: 70
End: 2020-03-18

## 2020-03-23 DIAGNOSIS — I10 BENIGN ESSENTIAL HYPERTENSION: Primary | ICD-10-CM

## 2020-03-23 RX ORDER — HYDROCHLOROTHIAZIDE 12.5 MG/1
CAPSULE, GELATIN COATED ORAL
Qty: 90 CAPSULE | Refills: 0 | Status: SHIPPED | OUTPATIENT
Start: 2020-03-23 | End: 2020-06-03 | Stop reason: SDUPTHER

## 2020-03-23 RX ORDER — VALSARTAN 160 MG/1
TABLET ORAL
Qty: 90 TABLET | Refills: 0 | Status: SHIPPED | OUTPATIENT
Start: 2020-03-23 | End: 2020-06-03 | Stop reason: SDUPTHER

## 2020-05-04 DIAGNOSIS — J45.20 MILD INTERMITTENT ASTHMA WITHOUT COMPLICATION: ICD-10-CM

## 2020-05-04 RX ORDER — MONTELUKAST SODIUM 10 MG/1
TABLET ORAL
Qty: 90 TABLET | Refills: 0 | Status: SHIPPED | OUTPATIENT
Start: 2020-05-04 | End: 2020-08-12 | Stop reason: SDUPTHER

## 2020-05-18 RX ORDER — FENOFIBRATE 200 MG/1
CAPSULE ORAL
Qty: 90 CAPSULE | Refills: 0 | Status: SHIPPED | OUTPATIENT
Start: 2020-05-18 | End: 2020-09-02 | Stop reason: SDUPTHER

## 2020-05-20 ENCOUNTER — TELEPHONE (OUTPATIENT)
Dept: SCHEDULING | Age: 70
End: 2020-05-20

## 2020-05-20 ENCOUNTER — OFFICE VISIT (OUTPATIENT)
Dept: INTERNAL MEDICINE | Age: 70
End: 2020-05-20

## 2020-05-20 SDOH — HEALTH STABILITY: PHYSICAL HEALTH: ON AVERAGE, HOW MANY DAYS PER WEEK DO YOU ENGAGE IN MODERATE TO STRENUOUS EXERCISE (LIKE A BRISK WALK)?: 0 DAYS

## 2020-05-20 SDOH — HEALTH STABILITY: MENTAL HEALTH: HOW OFTEN DO YOU HAVE A DRINK CONTAINING ALCOHOL?: NEVER

## 2020-05-20 SDOH — HEALTH STABILITY: PHYSICAL HEALTH: ON AVERAGE, HOW MANY MINUTES DO YOU ENGAGE IN EXERCISE AT THIS LEVEL?: 0 MIN

## 2020-05-20 ASSESSMENT — COGNITIVE AND FUNCTIONAL STATUS - GENERAL
DO YOU HAVE DIFFICULTY DRESSING OR BATHING: NO
BECAUSE OF A PHYSICAL, MENTAL, OR EMOTIONAL CONDITION, DO YOU HAVE DIFFICULTY DOING ERRANDS ALONE: NO
DO YOU HAVE SERIOUS DIFFICULTY WALKING OR CLIMBING STAIRS: NO
BECAUSE OF A PHYSICAL, MENTAL, OR EMOTIONAL CONDITION, DO YOU HAVE SERIOUS DIFFICULTY CONCENTRATING, REMEMBERING OR MAKING DECISIONS: NO

## 2020-05-20 ASSESSMENT — PATIENT HEALTH QUESTIONNAIRE - PHQ9
SUM OF ALL RESPONSES TO PHQ9 QUESTIONS 1 AND 2: 0
CLINICAL INTERPRETATION OF PHQ2 SCORE: NO FURTHER SCREENING NEEDED
CLINICAL INTERPRETATION OF PHQ9 SCORE: NO FURTHER SCREENING NEEDED
1. LITTLE INTEREST OR PLEASURE IN DOING THINGS: NOT AT ALL
SUM OF ALL RESPONSES TO PHQ9 QUESTIONS 1 AND 2: 0
2. FEELING DOWN, DEPRESSED OR HOPELESS: NOT AT ALL

## 2020-05-21 ENCOUNTER — TELEPHONE (OUTPATIENT)
Dept: SCHEDULING | Age: 70
End: 2020-05-21

## 2020-05-22 ENCOUNTER — TELEPHONE (OUTPATIENT)
Dept: SCHEDULING | Age: 70
End: 2020-05-22

## 2020-05-29 ENCOUNTER — APPOINTMENT (OUTPATIENT)
Dept: INTERNAL MEDICINE | Age: 70
End: 2020-05-29

## 2020-06-03 ENCOUNTER — LAB SERVICES (OUTPATIENT)
Dept: LAB | Age: 70
End: 2020-06-03

## 2020-06-03 ENCOUNTER — OFFICE VISIT (OUTPATIENT)
Dept: INTERNAL MEDICINE | Age: 70
End: 2020-06-03

## 2020-06-03 VITALS
RESPIRATION RATE: 16 BRPM | HEART RATE: 83 BPM | SYSTOLIC BLOOD PRESSURE: 172 MMHG | DIASTOLIC BLOOD PRESSURE: 82 MMHG | WEIGHT: 293 LBS | TEMPERATURE: 98 F | BODY MASS INDEX: 41.95 KG/M2 | HEIGHT: 70 IN

## 2020-06-03 DIAGNOSIS — G47.33 OBSTRUCTIVE SLEEP APNEA: ICD-10-CM

## 2020-06-03 DIAGNOSIS — E78.5 DYSLIPIDEMIA: ICD-10-CM

## 2020-06-03 DIAGNOSIS — E11.9 CONTROLLED TYPE 2 DIABETES MELLITUS WITHOUT COMPLICATION, WITHOUT LONG-TERM CURRENT USE OF INSULIN (CMD): ICD-10-CM

## 2020-06-03 DIAGNOSIS — I10 BENIGN ESSENTIAL HYPERTENSION: ICD-10-CM

## 2020-06-03 DIAGNOSIS — I25.10 CORONARY ARTERIOSCLEROSIS: ICD-10-CM

## 2020-06-03 DIAGNOSIS — R32 URINARY INCONTINENCE, UNSPECIFIED TYPE: ICD-10-CM

## 2020-06-03 DIAGNOSIS — E11.9 CONTROLLED TYPE 2 DIABETES MELLITUS WITHOUT COMPLICATION, WITHOUT LONG-TERM CURRENT USE OF INSULIN (CMD): Primary | ICD-10-CM

## 2020-06-03 DIAGNOSIS — J45.20 MILD INTERMITTENT ASTHMA WITHOUT COMPLICATION: ICD-10-CM

## 2020-06-03 DIAGNOSIS — Z11.59 ENCOUNTER FOR SCREENING FOR OTHER VIRAL DISEASES: ICD-10-CM

## 2020-06-03 DIAGNOSIS — E66.01 MORBID OBESITY (CMD): ICD-10-CM

## 2020-06-03 DIAGNOSIS — Z01.84 IMMUNITY STATUS TESTING: ICD-10-CM

## 2020-06-03 LAB
HBA1C MFR BLD: 6.6 % (ref 4.5–5.6)
HBV SURFACE AB SER QL: NEGATIVE
LDLC SERPL DIRECT ASSAY-MCNC: 128 MG/DL

## 2020-06-03 PROCEDURE — 36415 COLL VENOUS BLD VENIPUNCTURE: CPT

## 2020-06-03 PROCEDURE — 3077F SYST BP >= 140 MM HG: CPT | Performed by: INTERNAL MEDICINE

## 2020-06-03 PROCEDURE — 86706 HEP B SURFACE ANTIBODY: CPT | Performed by: INTERNAL MEDICINE

## 2020-06-03 PROCEDURE — 3079F DIAST BP 80-89 MM HG: CPT | Performed by: INTERNAL MEDICINE

## 2020-06-03 PROCEDURE — 83036 HEMOGLOBIN GLYCOSYLATED A1C: CPT | Performed by: INTERNAL MEDICINE

## 2020-06-03 PROCEDURE — 83721 ASSAY OF BLOOD LIPOPROTEIN: CPT | Performed by: INTERNAL MEDICINE

## 2020-06-03 PROCEDURE — 99214 OFFICE O/P EST MOD 30 MIN: CPT | Performed by: INTERNAL MEDICINE

## 2020-06-03 RX ORDER — VALSARTAN 160 MG/1
160 TABLET ORAL DAILY
Qty: 90 TABLET | Refills: 3 | Status: SHIPPED | OUTPATIENT
Start: 2020-06-03 | End: 2020-09-25 | Stop reason: SDUPTHER

## 2020-06-03 RX ORDER — HYDROCHLOROTHIAZIDE 12.5 MG/1
12.5 CAPSULE, GELATIN COATED ORAL DAILY
Qty: 90 CAPSULE | Refills: 3 | Status: SHIPPED | OUTPATIENT
Start: 2020-06-03 | End: 2021-07-30 | Stop reason: SDUPTHER

## 2020-06-03 SDOH — HEALTH STABILITY: PHYSICAL HEALTH: ON AVERAGE, HOW MANY MINUTES DO YOU ENGAGE IN EXERCISE AT THIS LEVEL?: 0 MIN

## 2020-06-03 SDOH — HEALTH STABILITY: MENTAL HEALTH: HOW OFTEN DO YOU HAVE A DRINK CONTAINING ALCOHOL?: NEVER

## 2020-06-03 SDOH — HEALTH STABILITY: PHYSICAL HEALTH: ON AVERAGE, HOW MANY DAYS PER WEEK DO YOU ENGAGE IN MODERATE TO STRENUOUS EXERCISE (LIKE A BRISK WALK)?: 0 DAYS

## 2020-06-03 ASSESSMENT — PATIENT HEALTH QUESTIONNAIRE - PHQ9
1. LITTLE INTEREST OR PLEASURE IN DOING THINGS: NOT AT ALL
CLINICAL INTERPRETATION OF PHQ2 SCORE: NO FURTHER SCREENING NEEDED
SUM OF ALL RESPONSES TO PHQ9 QUESTIONS 1 AND 2: 0
2. FEELING DOWN, DEPRESSED OR HOPELESS: NOT AT ALL
SUM OF ALL RESPONSES TO PHQ9 QUESTIONS 1 AND 2: 0
CLINICAL INTERPRETATION OF PHQ9 SCORE: NO FURTHER SCREENING NEEDED

## 2020-06-03 ASSESSMENT — ENCOUNTER SYMPTOMS
PSYCHIATRIC NEGATIVE: 1
DIZZINESS: 0
WHEEZING: 0
SHORTNESS OF BREATH: 0
UNEXPECTED WEIGHT CHANGE: 1
HEADACHES: 0
GASTROINTESTINAL NEGATIVE: 1
ENDOCRINE NEGATIVE: 1

## 2020-06-04 ENCOUNTER — TELEPHONE (OUTPATIENT)
Dept: SCHEDULING | Age: 70
End: 2020-06-04

## 2020-06-05 ENCOUNTER — E-ADVICE (OUTPATIENT)
Dept: INTERNAL MEDICINE | Age: 70
End: 2020-06-05

## 2020-06-06 ENCOUNTER — TELEPHONE (OUTPATIENT)
Dept: INTERNAL MEDICINE | Age: 70
End: 2020-06-06

## 2020-06-08 ENCOUNTER — TELEPHONE (OUTPATIENT)
Dept: SCHEDULING | Age: 70
End: 2020-06-08

## 2020-06-09 ENCOUNTER — TELEPHONE (OUTPATIENT)
Dept: SCHEDULING | Age: 70
End: 2020-06-09

## 2020-06-18 ENCOUNTER — TELEPHONE (OUTPATIENT)
Dept: SCHEDULING | Age: 70
End: 2020-06-18

## 2020-06-19 RX ORDER — PRAVASTATIN SODIUM 80 MG/1
TABLET ORAL
Qty: 90 TABLET | Refills: 0 | Status: SHIPPED | OUTPATIENT
Start: 2020-06-19 | End: 2020-10-17 | Stop reason: SDUPTHER

## 2020-06-19 RX ORDER — FLUTICASONE FUROATE AND VILANTEROL TRIFENATATE 200; 25 UG/1; UG/1
POWDER RESPIRATORY (INHALATION)
Qty: 180 EACH | Refills: 0 | Status: SHIPPED | OUTPATIENT
Start: 2020-06-19 | End: 2020-09-25 | Stop reason: DRUGHIGH

## 2020-06-30 ENCOUNTER — TELEPHONE (OUTPATIENT)
Dept: SCHEDULING | Age: 70
End: 2020-06-30

## 2020-06-30 DIAGNOSIS — M48.062 SPINAL STENOSIS OF LUMBAR REGION WITH NEUROGENIC CLAUDICATION: Primary | ICD-10-CM

## 2020-06-30 DIAGNOSIS — M48.062 SPINAL STENOSIS OF LUMBAR REGION WITH NEUROGENIC CLAUDICATION: ICD-10-CM

## 2020-07-06 ENCOUNTER — TELEPHONE (OUTPATIENT)
Dept: SCHEDULING | Age: 70
End: 2020-07-06

## 2020-07-06 DIAGNOSIS — R32 URINARY INCONTINENCE, UNSPECIFIED TYPE: Primary | ICD-10-CM

## 2020-07-06 RX ORDER — DULOXETIN HYDROCHLORIDE 60 MG/1
60 CAPSULE, DELAYED RELEASE ORAL DAILY
Qty: 90 CAPSULE | Refills: 0 | Status: SHIPPED | OUTPATIENT
Start: 2020-07-06 | End: 2020-09-25 | Stop reason: CLARIF

## 2020-07-21 ENCOUNTER — TELEPHONE (OUTPATIENT)
Dept: SCHEDULING | Age: 70
End: 2020-07-21

## 2020-07-21 DIAGNOSIS — Z12.31 VISIT FOR SCREENING MAMMOGRAM: Primary | ICD-10-CM

## 2020-07-29 DIAGNOSIS — Z12.31 VISIT FOR SCREENING MAMMOGRAM: ICD-10-CM

## 2020-08-12 DIAGNOSIS — J45.20 MILD INTERMITTENT ASTHMA WITHOUT COMPLICATION: ICD-10-CM

## 2020-08-12 RX ORDER — FLUTICASONE PROPIONATE 50 MCG
2 SPRAY, SUSPENSION (ML) NASAL DAILY
Qty: 1 INHALER | Refills: 3 | Status: SHIPPED | OUTPATIENT
Start: 2020-08-12 | End: 2021-04-03

## 2020-08-12 RX ORDER — MONTELUKAST SODIUM 10 MG/1
TABLET ORAL
Qty: 90 TABLET | Refills: 3 | Status: SHIPPED | OUTPATIENT
Start: 2020-08-12 | End: 2021-09-20

## 2020-09-02 ENCOUNTER — APPOINTMENT (OUTPATIENT)
Dept: INTERNAL MEDICINE | Age: 70
End: 2020-09-02

## 2020-09-02 RX ORDER — FENOFIBRATE 200 MG/1
200 CAPSULE ORAL DAILY
Qty: 90 CAPSULE | Refills: 0 | Status: SHIPPED | OUTPATIENT
Start: 2020-09-02 | End: 2020-12-28 | Stop reason: SDUPTHER

## 2020-09-03 ENCOUNTER — TELEPHONE (OUTPATIENT)
Dept: SCHEDULING | Age: 70
End: 2020-09-03

## 2020-09-25 ENCOUNTER — LAB SERVICES (OUTPATIENT)
Dept: LAB | Age: 70
End: 2020-09-25

## 2020-09-25 ENCOUNTER — OFFICE VISIT (OUTPATIENT)
Dept: INTERNAL MEDICINE | Age: 70
End: 2020-09-25

## 2020-09-25 VITALS
WEIGHT: 277.78 LBS | HEIGHT: 70 IN | HEART RATE: 76 BPM | RESPIRATION RATE: 18 BRPM | SYSTOLIC BLOOD PRESSURE: 153 MMHG | BODY MASS INDEX: 39.77 KG/M2 | TEMPERATURE: 97.1 F | DIASTOLIC BLOOD PRESSURE: 86 MMHG

## 2020-09-25 DIAGNOSIS — J45.20 MILD INTERMITTENT ASTHMA WITHOUT COMPLICATION: ICD-10-CM

## 2020-09-25 DIAGNOSIS — R32 URINARY INCONTINENCE, UNSPECIFIED TYPE: ICD-10-CM

## 2020-09-25 DIAGNOSIS — E11.9 CONTROLLED TYPE 2 DIABETES MELLITUS WITHOUT COMPLICATION, WITHOUT LONG-TERM CURRENT USE OF INSULIN (CMD): ICD-10-CM

## 2020-09-25 DIAGNOSIS — E78.5 DYSLIPIDEMIA: ICD-10-CM

## 2020-09-25 DIAGNOSIS — K21.9 GASTROESOPHAGEAL REFLUX DISEASE WITHOUT ESOPHAGITIS: ICD-10-CM

## 2020-09-25 DIAGNOSIS — G47.33 OBSTRUCTIVE SLEEP APNEA: ICD-10-CM

## 2020-09-25 DIAGNOSIS — I10 BENIGN ESSENTIAL HYPERTENSION: ICD-10-CM

## 2020-09-25 DIAGNOSIS — Z23 NEED FOR VACCINATION: Primary | ICD-10-CM

## 2020-09-25 DIAGNOSIS — N76.1 SUBACUTE VAGINITIS: ICD-10-CM

## 2020-09-25 DIAGNOSIS — E66.01 MORBID OBESITY (CMD): ICD-10-CM

## 2020-09-25 PROBLEM — M25.552 PAIN OF LEFT HIP JOINT: Status: RESOLVED | Noted: 2019-05-21 | Resolved: 2020-09-25

## 2020-09-25 PROCEDURE — 36415 COLL VENOUS BLD VENIPUNCTURE: CPT

## 2020-09-25 PROCEDURE — G0008 ADMIN INFLUENZA VIRUS VAC: HCPCS

## 2020-09-25 PROCEDURE — 84443 ASSAY THYROID STIM HORMONE: CPT | Performed by: INTERNAL MEDICINE

## 2020-09-25 PROCEDURE — 99214 OFFICE O/P EST MOD 30 MIN: CPT | Performed by: INTERNAL MEDICINE

## 2020-09-25 PROCEDURE — 3077F SYST BP >= 140 MM HG: CPT | Performed by: INTERNAL MEDICINE

## 2020-09-25 PROCEDURE — 3079F DIAST BP 80-89 MM HG: CPT | Performed by: INTERNAL MEDICINE

## 2020-09-25 PROCEDURE — 83036 HEMOGLOBIN GLYCOSYLATED A1C: CPT | Performed by: INTERNAL MEDICINE

## 2020-09-25 PROCEDURE — 83721 ASSAY OF BLOOD LIPOPROTEIN: CPT | Performed by: INTERNAL MEDICINE

## 2020-09-25 PROCEDURE — 90662 IIV NO PRSV INCREASED AG IM: CPT

## 2020-09-25 RX ORDER — OMEPRAZOLE 40 MG/1
40 CAPSULE, DELAYED RELEASE ORAL 2 TIMES DAILY
Qty: 180 CAPSULE | Refills: 1 | Status: SHIPPED | COMMUNITY
Start: 2020-09-25 | End: 2020-10-15 | Stop reason: SDUPTHER

## 2020-09-25 RX ORDER — FLUTICASONE FUROATE AND VILANTEROL TRIFENATATE 100; 25 UG/1; UG/1
1 POWDER RESPIRATORY (INHALATION) DAILY
Qty: 60 EACH | Refills: 11 | Status: SHIPPED | COMMUNITY
Start: 2020-09-25

## 2020-09-25 RX ORDER — VALSARTAN 320 MG/1
320 TABLET ORAL DAILY
Qty: 90 TABLET | Refills: 3 | Status: SHIPPED | COMMUNITY
Start: 2020-09-25

## 2020-09-25 SDOH — HEALTH STABILITY: PHYSICAL HEALTH: ON AVERAGE, HOW MANY MINUTES DO YOU ENGAGE IN EXERCISE AT THIS LEVEL?: 0 MIN

## 2020-09-25 SDOH — HEALTH STABILITY: PHYSICAL HEALTH: ON AVERAGE, HOW MANY DAYS PER WEEK DO YOU ENGAGE IN MODERATE TO STRENUOUS EXERCISE (LIKE A BRISK WALK)?: 0 DAYS

## 2020-09-25 SDOH — HEALTH STABILITY: MENTAL HEALTH: HOW OFTEN DO YOU HAVE A DRINK CONTAINING ALCOHOL?: NEVER

## 2020-09-25 ASSESSMENT — PATIENT HEALTH QUESTIONNAIRE - PHQ9
1. LITTLE INTEREST OR PLEASURE IN DOING THINGS: NOT AT ALL
SUM OF ALL RESPONSES TO PHQ9 QUESTIONS 1 AND 2: 0
CLINICAL INTERPRETATION OF PHQ2 SCORE: NO FURTHER SCREENING NEEDED
SUM OF ALL RESPONSES TO PHQ9 QUESTIONS 1 AND 2: 0
2. FEELING DOWN, DEPRESSED OR HOPELESS: NOT AT ALL
CLINICAL INTERPRETATION OF PHQ9 SCORE: NO FURTHER SCREENING NEEDED

## 2020-09-25 ASSESSMENT — COGNITIVE AND FUNCTIONAL STATUS - GENERAL
BECAUSE OF A PHYSICAL, MENTAL, OR EMOTIONAL CONDITION, DO YOU HAVE DIFFICULTY DOING ERRANDS ALONE: NO
DO YOU HAVE DIFFICULTY DRESSING OR BATHING: NO
DO YOU HAVE SERIOUS DIFFICULTY WALKING OR CLIMBING STAIRS: NO
BECAUSE OF A PHYSICAL, MENTAL, OR EMOTIONAL CONDITION, DO YOU HAVE SERIOUS DIFFICULTY CONCENTRATING, REMEMBERING OR MAKING DECISIONS: NO

## 2020-09-26 LAB
HBA1C MFR BLD: 6.7 % (ref 4.5–5.6)
LDLC SERPL DIRECT ASSAY-MCNC: 139 MG/DL
TSH SERPL-ACNC: 1.96 MCUNITS/ML (ref 0.35–5)

## 2020-09-26 ASSESSMENT — ENCOUNTER SYMPTOMS
HEADACHES: 0
POLYDIPSIA: 0
UNEXPECTED WEIGHT CHANGE: 1
GASTROINTESTINAL NEGATIVE: 1
DIZZINESS: 0
PSYCHIATRIC NEGATIVE: 1

## 2020-10-13 ENCOUNTER — TELEPHONE (OUTPATIENT)
Dept: SCHEDULING | Age: 70
End: 2020-10-13

## 2020-10-14 ENCOUNTER — TELEPHONE (OUTPATIENT)
Dept: SCHEDULING | Age: 70
End: 2020-10-14

## 2020-10-14 DIAGNOSIS — M54.17 RADICULOPATHY, LUMBOSACRAL REGION: Primary | ICD-10-CM

## 2020-10-14 DIAGNOSIS — M48.07 SPINAL STENOSIS, LUMBOSACRAL REGION: ICD-10-CM

## 2020-10-15 DIAGNOSIS — K21.9 GASTROESOPHAGEAL REFLUX DISEASE WITHOUT ESOPHAGITIS: ICD-10-CM

## 2020-10-16 DIAGNOSIS — E11.9 CONTROLLED TYPE 2 DIABETES MELLITUS WITHOUT COMPLICATION, WITHOUT LONG-TERM CURRENT USE OF INSULIN (CMD): ICD-10-CM

## 2020-10-16 RX ORDER — OMEPRAZOLE 40 MG/1
40 CAPSULE, DELAYED RELEASE ORAL 2 TIMES DAILY
Qty: 180 CAPSULE | Refills: 1 | Status: SHIPPED | OUTPATIENT
Start: 2020-10-16 | End: 2021-05-14

## 2020-10-19 RX ORDER — PRAVASTATIN SODIUM 80 MG/1
80 TABLET ORAL DAILY
Qty: 90 TABLET | Refills: 3 | Status: SHIPPED | OUTPATIENT
Start: 2020-10-19 | End: 2021-01-06

## 2020-10-29 ENCOUNTER — TELEPHONE (OUTPATIENT)
Dept: SCHEDULING | Age: 70
End: 2020-10-29

## 2020-10-29 DIAGNOSIS — M54.17 RADICULOPATHY, LUMBOSACRAL REGION: Primary | ICD-10-CM

## 2020-10-30 RX ORDER — PREGABALIN 50 MG/1
50 CAPSULE ORAL 3 TIMES DAILY
Qty: 90 CAPSULE | Refills: 3 | Status: SHIPPED | OUTPATIENT
Start: 2020-10-30 | End: 2021-05-05

## 2020-11-11 ENCOUNTER — TELEPHONE (OUTPATIENT)
Dept: SCHEDULING | Age: 70
End: 2020-11-11

## 2020-11-13 ENCOUNTER — APPOINTMENT (OUTPATIENT)
Dept: OBGYN | Age: 70
End: 2020-11-13
Attending: INTERNAL MEDICINE

## 2020-12-28 DIAGNOSIS — E78.2 MIXED HYPERLIPIDEMIA: Primary | ICD-10-CM

## 2020-12-28 RX ORDER — FENOFIBRATE 200 MG/1
200 CAPSULE ORAL DAILY
Qty: 90 CAPSULE | Refills: 3 | Status: SHIPPED | OUTPATIENT
Start: 2020-12-28

## 2020-12-31 DIAGNOSIS — I10 BENIGN ESSENTIAL HYPERTENSION: ICD-10-CM

## 2021-01-01 ENCOUNTER — EXTERNAL RECORD (OUTPATIENT)
Dept: HEALTH INFORMATION MANAGEMENT | Facility: OTHER | Age: 71
End: 2021-01-01

## 2021-01-01 RX ORDER — DILTIAZEM HYDROCHLORIDE 180 MG/1
CAPSULE, COATED, EXTENDED RELEASE ORAL
Qty: 90 CAPSULE | Refills: 3 | Status: SHIPPED | OUTPATIENT
Start: 2021-01-01 | End: 2021-01-06 | Stop reason: SDUPTHER

## 2021-01-06 ENCOUNTER — OFFICE VISIT (OUTPATIENT)
Dept: INTERNAL MEDICINE | Age: 71
End: 2021-01-06

## 2021-01-06 ENCOUNTER — LAB SERVICES (OUTPATIENT)
Dept: LAB | Age: 71
End: 2021-01-06

## 2021-01-06 VITALS
TEMPERATURE: 97.8 F | HEART RATE: 79 BPM | DIASTOLIC BLOOD PRESSURE: 74 MMHG | WEIGHT: 286.6 LBS | RESPIRATION RATE: 18 BRPM | HEIGHT: 70 IN | BODY MASS INDEX: 41.03 KG/M2 | SYSTOLIC BLOOD PRESSURE: 144 MMHG

## 2021-01-06 DIAGNOSIS — M54.17 RADICULOPATHY, LUMBOSACRAL REGION: ICD-10-CM

## 2021-01-06 DIAGNOSIS — E11.9 CONTROLLED TYPE 2 DIABETES MELLITUS WITHOUT COMPLICATION, WITHOUT LONG-TERM CURRENT USE OF INSULIN (CMD): Primary | ICD-10-CM

## 2021-01-06 DIAGNOSIS — E11.9 CONTROLLED TYPE 2 DIABETES MELLITUS WITHOUT COMPLICATION, WITHOUT LONG-TERM CURRENT USE OF INSULIN (CMD): ICD-10-CM

## 2021-01-06 DIAGNOSIS — I10 BENIGN ESSENTIAL HYPERTENSION: ICD-10-CM

## 2021-01-06 DIAGNOSIS — E66.01 MORBID OBESITY (CMD): ICD-10-CM

## 2021-01-06 DIAGNOSIS — G47.33 OBSTRUCTIVE SLEEP APNEA: ICD-10-CM

## 2021-01-06 DIAGNOSIS — E78.5 DYSLIPIDEMIA: ICD-10-CM

## 2021-01-06 DIAGNOSIS — J45.20 MILD INTERMITTENT ASTHMA WITHOUT COMPLICATION: ICD-10-CM

## 2021-01-06 DIAGNOSIS — M48.07 SPINAL STENOSIS, LUMBOSACRAL REGION: ICD-10-CM

## 2021-01-06 PROBLEM — N76.1 SUBACUTE VAGINITIS: Status: RESOLVED | Noted: 2020-09-25 | Resolved: 2021-01-06

## 2021-01-06 LAB
ANION GAP SERPL CALC-SCNC: 13 MMOL/L (ref 10–20)
BUN SERPL-MCNC: 14 MG/DL (ref 6–20)
BUN/CREAT SERPL: 16 (ref 7–25)
CALCIUM SERPL-MCNC: 9.5 MG/DL (ref 8.4–10.2)
CHLORIDE SERPL-SCNC: 104 MMOL/L (ref 98–107)
CO2 SERPL-SCNC: 27 MMOL/L (ref 21–32)
CREAT SERPL-MCNC: 0.9 MG/DL (ref 0.51–0.95)
FASTING DURATION TIME PATIENT: ABNORMAL H
GFR SERPLBLD BASED ON 1.73 SQ M-ARVRAT: 74 ML/MIN/1.73M2
GLUCOSE SERPL-MCNC: 194 MG/DL (ref 65–99)
HBA1C MFR BLD: 8.4 % (ref 4.5–5.6)
POTASSIUM SERPL-SCNC: 3.8 MMOL/L (ref 3.4–5.1)
SODIUM SERPL-SCNC: 140 MMOL/L (ref 135–145)

## 2021-01-06 PROCEDURE — 3078F DIAST BP <80 MM HG: CPT | Performed by: INTERNAL MEDICINE

## 2021-01-06 PROCEDURE — 80048 BASIC METABOLIC PNL TOTAL CA: CPT | Performed by: INTERNAL MEDICINE

## 2021-01-06 PROCEDURE — 99214 OFFICE O/P EST MOD 30 MIN: CPT | Performed by: INTERNAL MEDICINE

## 2021-01-06 PROCEDURE — 3077F SYST BP >= 140 MM HG: CPT | Performed by: INTERNAL MEDICINE

## 2021-01-06 PROCEDURE — 83036 HEMOGLOBIN GLYCOSYLATED A1C: CPT | Performed by: INTERNAL MEDICINE

## 2021-01-06 RX ORDER — DILTIAZEM HYDROCHLORIDE 240 MG/1
180 CAPSULE, COATED, EXTENDED RELEASE ORAL DAILY
Qty: 90 CAPSULE | Refills: 3 | Status: SHIPPED | OUTPATIENT
Start: 2021-01-06

## 2021-01-06 RX ORDER — ATORVASTATIN CALCIUM 40 MG/1
40 TABLET, FILM COATED ORAL DAILY
Qty: 90 TABLET | Refills: 3 | Status: SHIPPED | COMMUNITY
Start: 2021-01-06

## 2021-01-06 SDOH — HEALTH STABILITY: PHYSICAL HEALTH: ON AVERAGE, HOW MANY MINUTES DO YOU ENGAGE IN EXERCISE AT THIS LEVEL?: 0 MIN

## 2021-01-06 SDOH — HEALTH STABILITY: MENTAL HEALTH: HOW OFTEN DO YOU HAVE A DRINK CONTAINING ALCOHOL?: NEVER

## 2021-01-06 SDOH — HEALTH STABILITY: PHYSICAL HEALTH: ON AVERAGE, HOW MANY DAYS PER WEEK DO YOU ENGAGE IN MODERATE TO STRENUOUS EXERCISE (LIKE A BRISK WALK)?: 0 DAYS

## 2021-01-06 ASSESSMENT — PATIENT HEALTH QUESTIONNAIRE - PHQ9
SUM OF ALL RESPONSES TO PHQ9 QUESTIONS 1 AND 2: 0
2. FEELING DOWN, DEPRESSED OR HOPELESS: NOT AT ALL
SUM OF ALL RESPONSES TO PHQ9 QUESTIONS 1 AND 2: 0
CLINICAL INTERPRETATION OF PHQ9 SCORE: NO FURTHER SCREENING NEEDED
CLINICAL INTERPRETATION OF PHQ2 SCORE: NO FURTHER SCREENING NEEDED
1. LITTLE INTEREST OR PLEASURE IN DOING THINGS: NOT AT ALL

## 2021-01-06 ASSESSMENT — ENCOUNTER SYMPTOMS
COUGH: 0
PSYCHIATRIC NEGATIVE: 1
WHEEZING: 0
POLYDIPSIA: 0
BACK PAIN: 1
HEADACHES: 0
UNEXPECTED WEIGHT CHANGE: 1
DIZZINESS: 0
SHORTNESS OF BREATH: 0

## 2021-01-08 ENCOUNTER — TELEPHONE (OUTPATIENT)
Dept: SCHEDULING | Age: 71
End: 2021-01-08

## 2021-01-14 ENCOUNTER — TELEPHONE (OUTPATIENT)
Dept: SCHEDULING | Age: 71
End: 2021-01-14

## 2021-01-14 ENCOUNTER — OFFICE VISIT (OUTPATIENT)
Dept: LAB | Age: 71
End: 2021-01-14
Attending: INTERNAL MEDICINE

## 2021-01-14 ENCOUNTER — OFFICE VISIT (OUTPATIENT)
Dept: INTERNAL MEDICINE | Age: 71
End: 2021-01-14

## 2021-01-14 DIAGNOSIS — J45.20 MILD INTERMITTENT ASTHMA WITHOUT COMPLICATION: ICD-10-CM

## 2021-01-14 DIAGNOSIS — E11.9 CONTROLLED TYPE 2 DIABETES MELLITUS WITHOUT COMPLICATION, WITHOUT LONG-TERM CURRENT USE OF INSULIN (CMD): ICD-10-CM

## 2021-01-14 DIAGNOSIS — R07.89 ATYPICAL CHEST PAIN: Primary | ICD-10-CM

## 2021-01-14 DIAGNOSIS — R07.89 ATYPICAL CHEST PAIN: ICD-10-CM

## 2021-01-14 DIAGNOSIS — I25.10 CORONARY ARTERIOSCLEROSIS: ICD-10-CM

## 2021-01-14 DIAGNOSIS — I10 BENIGN ESSENTIAL HYPERTENSION: ICD-10-CM

## 2021-01-14 PROCEDURE — 3077F SYST BP >= 140 MM HG: CPT | Performed by: INTERNAL MEDICINE

## 2021-01-14 PROCEDURE — 3079F DIAST BP 80-89 MM HG: CPT | Performed by: INTERNAL MEDICINE

## 2021-01-14 PROCEDURE — X1094 NO CHARGE VISIT: HCPCS

## 2021-01-14 PROCEDURE — 93000 ELECTROCARDIOGRAM COMPLETE: CPT | Performed by: INTERNAL MEDICINE

## 2021-01-14 PROCEDURE — 99214 OFFICE O/P EST MOD 30 MIN: CPT | Performed by: INTERNAL MEDICINE

## 2021-01-14 RX ORDER — ALBUTEROL SULFATE 90 UG/1
2 AEROSOL, METERED RESPIRATORY (INHALATION) EVERY 4 HOURS PRN
Qty: 1 INHALER | Refills: 6 | OUTPATIENT
Start: 2021-01-14 | End: 2021-01-19 | Stop reason: SDUPTHER

## 2021-01-14 RX ORDER — ALBUTEROL SULFATE 90 UG/1
2 AEROSOL, METERED RESPIRATORY (INHALATION) EVERY 4 HOURS PRN
Qty: 1 INHALER | Refills: 6 | OUTPATIENT
Start: 2021-01-14 | End: 2021-06-10 | Stop reason: CLARIF

## 2021-01-14 RX ORDER — ALBUTEROL SULFATE 90 UG/1
2 AEROSOL, METERED RESPIRATORY (INHALATION) EVERY 4 HOURS PRN
Qty: 1 INHALER | Refills: 6 | Status: SHIPPED | OUTPATIENT
Start: 2021-01-14 | End: 2021-01-14 | Stop reason: SDUPTHER

## 2021-01-14 RX ORDER — ALBUTEROL SULFATE 0.63 MG/3ML
0.63 SOLUTION RESPIRATORY (INHALATION) EVERY 6 HOURS PRN
Qty: 300 ML | Refills: 3 | Status: SHIPPED | OUTPATIENT
Start: 2021-01-14 | End: 2021-01-14 | Stop reason: SDUPTHER

## 2021-01-14 RX ORDER — ALBUTEROL SULFATE 0.63 MG/3ML
0.63 SOLUTION RESPIRATORY (INHALATION) EVERY 6 HOURS PRN
Qty: 300 ML | Refills: 3 | OUTPATIENT
Start: 2021-01-14

## 2021-01-14 ASSESSMENT — PATIENT HEALTH QUESTIONNAIRE - PHQ9
CLINICAL INTERPRETATION OF PHQ9 SCORE: NO FURTHER SCREENING NEEDED
SUM OF ALL RESPONSES TO PHQ9 QUESTIONS 1 AND 2: 0
SUM OF ALL RESPONSES TO PHQ9 QUESTIONS 1 AND 2: 0
CLINICAL INTERPRETATION OF PHQ2 SCORE: NO FURTHER SCREENING NEEDED
1. LITTLE INTEREST OR PLEASURE IN DOING THINGS: NOT AT ALL
2. FEELING DOWN, DEPRESSED OR HOPELESS: NOT AT ALL
1. LITTLE INTEREST OR PLEASURE IN DOING THINGS: NOT AT ALL
CLINICAL INTERPRETATION OF PHQ2 SCORE: NO FURTHER SCREENING NEEDED
2. FEELING DOWN, DEPRESSED OR HOPELESS: NOT AT ALL
SUM OF ALL RESPONSES TO PHQ9 QUESTIONS 1 AND 2: 0

## 2021-01-14 ASSESSMENT — ENCOUNTER SYMPTOMS
POLYDIPSIA: 0
COUGH: 0
NAUSEA: 0
HEADACHES: 1
SHORTNESS OF BREATH: 1
PSYCHIATRIC NEGATIVE: 1
DIZZINESS: 1
CHILLS: 0
FEVER: 0

## 2021-01-14 ASSESSMENT — PAIN SCALES - GENERAL: PAINLEVEL: 3-4

## 2021-01-15 ENCOUNTER — TELEPHONE (OUTPATIENT)
Dept: INTERNAL MEDICINE | Age: 71
End: 2021-01-15

## 2021-01-19 ENCOUNTER — TELEPHONE (OUTPATIENT)
Dept: SCHEDULING | Age: 71
End: 2021-01-19

## 2021-01-19 DIAGNOSIS — M54.17 RADICULOPATHY, LUMBOSACRAL REGION: Primary | ICD-10-CM

## 2021-01-19 RX ORDER — PREDNISONE 10 MG/1
10 TABLET ORAL 2 TIMES DAILY
Qty: 14 TABLET | Refills: 0 | Status: SHIPPED | OUTPATIENT
Start: 2021-01-19 | End: 2021-04-29 | Stop reason: CLARIF

## 2021-01-21 ENCOUNTER — ADVANCED DIRECTIVES (OUTPATIENT)
Dept: INTERNAL MEDICINE | Age: 71
End: 2021-01-21

## 2021-02-08 ENCOUNTER — TELEPHONE (OUTPATIENT)
Dept: SCHEDULING | Age: 71
End: 2021-02-08

## 2021-03-01 ENCOUNTER — TELEPHONE (OUTPATIENT)
Dept: SCHEDULING | Age: 71
End: 2021-03-01

## 2021-03-01 DIAGNOSIS — R32 URINARY INCONTINENCE, UNSPECIFIED TYPE: ICD-10-CM

## 2021-03-03 ENCOUNTER — TELEPHONE (OUTPATIENT)
Dept: SCHEDULING | Age: 71
End: 2021-03-03

## 2021-03-16 ENCOUNTER — OFFICE VISIT (OUTPATIENT)
Dept: PAIN CLINIC | Facility: HOSPITAL | Age: 71
End: 2021-03-16
Attending: ANESTHESIOLOGY
Payer: MEDICARE

## 2021-03-16 VITALS
SYSTOLIC BLOOD PRESSURE: 178 MMHG | WEIGHT: 190 LBS | DIASTOLIC BLOOD PRESSURE: 94 MMHG | HEIGHT: 70 IN | HEART RATE: 72 BPM | BODY MASS INDEX: 27.2 KG/M2 | RESPIRATION RATE: 18 BRPM

## 2021-03-16 DIAGNOSIS — M54.42 CHRONIC BILATERAL LOW BACK PAIN WITH BILATERAL SCIATICA: Primary | ICD-10-CM

## 2021-03-16 DIAGNOSIS — M54.41 CHRONIC BILATERAL LOW BACK PAIN WITH BILATERAL SCIATICA: Primary | ICD-10-CM

## 2021-03-16 DIAGNOSIS — M48.061 SPINAL STENOSIS OF LUMBAR REGION WITHOUT NEUROGENIC CLAUDICATION: ICD-10-CM

## 2021-03-16 DIAGNOSIS — G89.29 CHRONIC BILATERAL LOW BACK PAIN WITH BILATERAL SCIATICA: Primary | ICD-10-CM

## 2021-03-16 PROBLEM — M54.40 CHRONIC BILATERAL LOW BACK PAIN WITH SCIATICA: Status: ACTIVE | Noted: 2021-03-16

## 2021-03-16 PROCEDURE — 99211 OFF/OP EST MAY X REQ PHY/QHP: CPT

## 2021-03-16 RX ORDER — ALBUTEROL SULFATE 0.75 MG/3ML
3 SOLUTION RESPIRATORY (INHALATION) EVERY 6 HOURS PRN
COMMUNITY
Start: 2021-01-15 | End: 2021-06-03 | Stop reason: ALTCHOICE

## 2021-03-16 RX ORDER — ATORVASTATIN CALCIUM 40 MG/1
40 TABLET, FILM COATED ORAL NIGHTLY
COMMUNITY
Start: 2021-01-17 | End: 2021-09-28

## 2021-03-16 RX ORDER — DILTIAZEM HYDROCHLORIDE 240 MG/1
240 CAPSULE, COATED, EXTENDED RELEASE ORAL DAILY
Status: ON HOLD | COMMUNITY
Start: 2021-01-17 | End: 2021-04-30

## 2021-03-16 NOTE — CHRONIC PAIN
[facility]  New Patient Evaluation      History of Present Illness:    Margarita Urias is a 69year old female,  referred to the pain clinic by Dr. Toshia Rothman, for lower back pain and bilateral leg pain which began >10yrs ago, and wor Furoate-Vilanterol (BREO ELLIPTA) 100-25 MCG/INH Inhalation Aerosol Powder, Breath Activated Inhale 1 puff into the lungs daily.  3 each 3   • fenofibrate micronized 200 MG Oral Cap Take 200 mg by mouth every morning before breakfast.     • Cetirizine HCl 1 POLYPECTOMY 34757 N/A 7/13/2020    Performed by Mery Alvarado MD at [facility]   • KNEE REPLACEMENT SURGERY          Family History:   History reviewed. No pertinent family history.      Social History:  Social History    Socioeconomic Hist Examination:   03/16/21  0935   BP: (!) 178/94   Pulse: 72   Resp: 18        General:  Alert and oriented x3; heavy built  Affect:  NAD  Head:  Normocephalic, atraumatic; left eye ptosis and lateral eye deviation  Eyes:  anicteric; no injection  CV:  RRR, old old female with history of chronic lower back pain of >10 yrs status post failed SCS therapy, presented for new patient visit evaluation for worsening lumbo-sacral  back pain with radiation into the groin.      PLAN:  RECOMMENDATIONS:  1)  Medical Modal

## 2021-03-19 ENCOUNTER — TELEPHONE (OUTPATIENT)
Dept: PAIN CLINIC | Facility: HOSPITAL | Age: 71
End: 2021-03-19

## 2021-03-19 NOTE — TELEPHONE ENCOUNTER
Writer spoke with pt, procedure scheduled for 04/19/2021 with a follow up on 05/03/2021. Verbal instructions given, all questions answered no further needs.

## 2021-03-30 ENCOUNTER — TELEPHONE (OUTPATIENT)
Dept: SCHEDULING | Age: 71
End: 2021-03-30

## 2021-04-03 DIAGNOSIS — J45.20 MILD INTERMITTENT ASTHMA WITHOUT COMPLICATION: ICD-10-CM

## 2021-04-03 RX ORDER — FLUTICASONE PROPIONATE 50 MCG
SPRAY, SUSPENSION (ML) NASAL
Qty: 16 G | Refills: 1 | Status: SHIPPED | OUTPATIENT
Start: 2021-04-03 | End: 2021-04-29 | Stop reason: CLARIF

## 2021-04-07 ENCOUNTER — OFFICE VISIT (OUTPATIENT)
Dept: INTERNAL MEDICINE | Age: 71
End: 2021-04-07

## 2021-04-07 ENCOUNTER — LAB SERVICES (OUTPATIENT)
Dept: LAB | Age: 71
End: 2021-04-07

## 2021-04-07 ENCOUNTER — TELEPHONE (OUTPATIENT)
Dept: SCHEDULING | Age: 71
End: 2021-04-07

## 2021-04-07 DIAGNOSIS — E66.01 MORBID OBESITY (CMD): ICD-10-CM

## 2021-04-07 DIAGNOSIS — E78.5 DYSLIPIDEMIA: ICD-10-CM

## 2021-04-07 DIAGNOSIS — I25.10 CORONARY ARTERIOSCLEROSIS: ICD-10-CM

## 2021-04-07 DIAGNOSIS — I10 BENIGN ESSENTIAL HYPERTENSION: ICD-10-CM

## 2021-04-07 DIAGNOSIS — R32 URINARY INCONTINENCE, UNSPECIFIED TYPE: ICD-10-CM

## 2021-04-07 DIAGNOSIS — J30.9 ALLERGIC RHINITIS, UNSPECIFIED SEASONALITY, UNSPECIFIED TRIGGER: ICD-10-CM

## 2021-04-07 DIAGNOSIS — G47.33 OBSTRUCTIVE SLEEP APNEA: ICD-10-CM

## 2021-04-07 DIAGNOSIS — M48.07 SPINAL STENOSIS, LUMBOSACRAL REGION: ICD-10-CM

## 2021-04-07 DIAGNOSIS — J45.20 MILD INTERMITTENT ASTHMA WITHOUT COMPLICATION: ICD-10-CM

## 2021-04-07 DIAGNOSIS — E11.9 CONTROLLED TYPE 2 DIABETES MELLITUS WITHOUT COMPLICATION, WITHOUT LONG-TERM CURRENT USE OF INSULIN (CMD): ICD-10-CM

## 2021-04-07 PROBLEM — R07.89 ATYPICAL CHEST PAIN: Status: RESOLVED | Noted: 2021-01-14 | Resolved: 2021-04-07

## 2021-04-07 LAB
ALBUMIN SERPL-MCNC: 4.2 G/DL (ref 3.6–5.1)
ALBUMIN/GLOB SERPL: 1.2 {RATIO} (ref 1–2.4)
ALP SERPL-CCNC: 74 UNITS/L (ref 45–117)
ALT SERPL-CCNC: 31 UNITS/L
ANION GAP SERPL CALC-SCNC: 12 MMOL/L (ref 10–20)
AST SERPL-CCNC: 19 UNITS/L
BILIRUB SERPL-MCNC: 0.5 MG/DL (ref 0.2–1)
BUN SERPL-MCNC: 15 MG/DL (ref 6–20)
BUN/CREAT SERPL: 17 (ref 7–25)
CALCIUM SERPL-MCNC: 10.2 MG/DL (ref 8.4–10.2)
CHLORIDE SERPL-SCNC: 107 MMOL/L (ref 98–107)
CHOLEST SERPL-MCNC: 159 MG/DL
CHOLEST/HDLC SERPL: 2.9 {RATIO}
CO2 SERPL-SCNC: 27 MMOL/L (ref 21–32)
CREAT SERPL-MCNC: 0.88 MG/DL (ref 0.51–0.95)
FASTING DURATION TIME PATIENT: ABNORMAL H
FASTING DURATION TIME PATIENT: NORMAL H
GFR SERPLBLD BASED ON 1.73 SQ M-ARVRAT: 76 ML/MIN/1.73M2
GLOBULIN SER-MCNC: 3.5 G/DL (ref 2–4)
GLUCOSE SERPL-MCNC: 129 MG/DL (ref 65–99)
HBA1C MFR BLD: 7.4 % (ref 4.5–5.6)
HDLC SERPL-MCNC: 55 MG/DL
LDLC SERPL CALC-MCNC: 89 MG/DL
NONHDLC SERPL-MCNC: 104 MG/DL
POTASSIUM SERPL-SCNC: 3.9 MMOL/L (ref 3.4–5.1)
PROT SERPL-MCNC: 7.7 G/DL (ref 6.4–8.2)
SODIUM SERPL-SCNC: 142 MMOL/L (ref 135–145)
TRIGL SERPL-MCNC: 75 MG/DL

## 2021-04-07 PROCEDURE — 80061 LIPID PANEL: CPT | Performed by: INTERNAL MEDICINE

## 2021-04-07 PROCEDURE — 83036 HEMOGLOBIN GLYCOSYLATED A1C: CPT | Performed by: INTERNAL MEDICINE

## 2021-04-07 PROCEDURE — 36415 COLL VENOUS BLD VENIPUNCTURE: CPT

## 2021-04-07 PROCEDURE — 99214 OFFICE O/P EST MOD 30 MIN: CPT | Performed by: INTERNAL MEDICINE

## 2021-04-07 PROCEDURE — 80053 COMPREHEN METABOLIC PANEL: CPT | Performed by: INTERNAL MEDICINE

## 2021-04-07 RX ORDER — PRAVASTATIN SODIUM 80 MG/1
80 TABLET ORAL DAILY
COMMUNITY
Start: 2021-01-30 | End: 2021-04-07 | Stop reason: ALTCHOICE

## 2021-04-07 RX ORDER — ALBUTEROL SULFATE 90 UG/1
2 AEROSOL, METERED RESPIRATORY (INHALATION)
COMMUNITY
Start: 2021-01-17

## 2021-04-07 RX ORDER — DILTIAZEM HYDROCHLORIDE 240 MG/1
240 CAPSULE, EXTENDED RELEASE ORAL
COMMUNITY
Start: 2021-01-17 | End: 2021-04-07 | Stop reason: SDUPTHER

## 2021-04-07 RX ORDER — AZELASTINE HCL 205.5 UG/1
1 SPRAY NASAL 2 TIMES DAILY PRN
Qty: 30 ML | Refills: 11 | Status: SHIPPED | OUTPATIENT
Start: 2021-04-07

## 2021-04-07 ASSESSMENT — PAIN SCALES - GENERAL: PAINLEVEL: 0

## 2021-04-07 ASSESSMENT — ACTIVITIES OF DAILY LIVING (ADL)
ADL_SCORE: 12
NEEDS_ASSIST: NO
ADL_BEFORE_ADMISSION: INDEPENDENT
SENSORY_SUPPORT_DEVICES: DENTURES;EYEGLASSES
RECENT_DECLINE_ADL: YES, DECLINE IN AMBULATION/TRANSFERRING, COLLABORATE WITH PROVIDER (T)
ADL_SHORT_OF_BREATH: NO

## 2021-04-07 ASSESSMENT — COGNITIVE AND FUNCTIONAL STATUS - GENERAL
BECAUSE OF A PHYSICAL, MENTAL, OR EMOTIONAL CONDITION, DO YOU HAVE SERIOUS DIFFICULTY CONCENTRATING, REMEMBERING OR MAKING DECISIONS: YES
BECAUSE OF A PHYSICAL, MENTAL, OR EMOTIONAL CONDITION, DO YOU HAVE DIFFICULTY DOING ERRANDS ALONE: NO
ARE YOU DEAF OR DO YOU HAVE SERIOUS DIFFICULTY  HEARING: YES
DO YOU HAVE DIFFICULTY DRESSING OR BATHING: NO
DO YOU HAVE SERIOUS DIFFICULTY WALKING OR CLIMBING STAIRS: YES
ARE YOU BLIND OR DO YOU HAVE SERIOUS DIFFICULTY SEEING, EVEN WHEN WEARING GLASSES: YES

## 2021-04-07 ASSESSMENT — MINI COG
PATIENT WAS GIVEN REPEAT BACK WORDS FROM VERSION: IMMEDIATE REPEAT BACK - APPLE WATCH PENNY
ABLE TO REPEAT THE 3 WORDS GIVEN PREVIOUSLY?: APPLE
PATIENT ABLE TO FILL IN THE CLOCK FACE WITH 10 MINUTES PAST 11 O'CLOCK?: YES, CLOCK IS CORRECT

## 2021-04-08 ASSESSMENT — ENCOUNTER SYMPTOMS
RHINORRHEA: 1
BACK PAIN: 1
POLYDIPSIA: 0
UNEXPECTED WEIGHT CHANGE: 1
RESPIRATORY NEGATIVE: 1
GASTROINTESTINAL NEGATIVE: 1
DIZZINESS: 0
HEADACHES: 0

## 2021-04-09 ENCOUNTER — TELEPHONE (OUTPATIENT)
Dept: PAIN CLINIC | Facility: HOSPITAL | Age: 71
End: 2021-04-09

## 2021-04-13 ENCOUNTER — TELEPHONE (OUTPATIENT)
Dept: SCHEDULING | Age: 71
End: 2021-04-13

## 2021-04-13 DIAGNOSIS — G63 POLYNEUROPATHY ASSOCIATED WITH UNDERLYING DISEASE (CMD): ICD-10-CM

## 2021-04-15 ENCOUNTER — TELEPHONE (OUTPATIENT)
Dept: SCHEDULING | Age: 71
End: 2021-04-15

## 2021-04-16 ENCOUNTER — LAB REQUISITION (OUTPATIENT)
Dept: LAB | Facility: HOSPITAL | Age: 71
End: 2021-04-16
Payer: MEDICARE

## 2021-04-16 DIAGNOSIS — Z01.818 ENCOUNTER FOR OTHER PREPROCEDURAL EXAMINATION: ICD-10-CM

## 2021-04-19 ENCOUNTER — SURGERY CENTER DOCUMENTATION (OUTPATIENT)
Dept: SURGERY | Age: 71
End: 2021-04-19

## 2021-04-19 NOTE — PROCEDURES
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 Imaging revealed  good distribution of the contrast dye around the nerve root sleeve as well as in the epidural space.   Hard copy of this film was taken and plac

## 2021-04-21 ENCOUNTER — TELEPHONE (OUTPATIENT)
Dept: CARDIOLOGY | Age: 71
End: 2021-04-21

## 2021-04-22 ENCOUNTER — TELEPHONE (OUTPATIENT)
Dept: SCHEDULING | Age: 71
End: 2021-04-22

## 2021-04-29 ENCOUNTER — OFFICE VISIT (OUTPATIENT)
Dept: CARDIOLOGY | Age: 71
End: 2021-04-29

## 2021-04-29 ENCOUNTER — APPOINTMENT (OUTPATIENT)
Dept: CV DIAGNOSTICS | Facility: HOSPITAL | Age: 71
End: 2021-04-29
Attending: NURSE PRACTITIONER
Payer: MEDICARE

## 2021-04-29 ENCOUNTER — APPOINTMENT (OUTPATIENT)
Dept: CT IMAGING | Facility: HOSPITAL | Age: 71
End: 2021-04-29
Attending: EMERGENCY MEDICINE
Payer: MEDICARE

## 2021-04-29 ENCOUNTER — HOSPITAL ENCOUNTER (OUTPATIENT)
Facility: HOSPITAL | Age: 71
Setting detail: OBSERVATION
Discharge: HOME | End: 2021-04-30
Attending: EMERGENCY MEDICINE | Admitting: INTERNAL MEDICINE
Payer: MEDICARE

## 2021-04-29 ENCOUNTER — APPOINTMENT (OUTPATIENT)
Dept: GENERAL RADIOLOGY | Facility: HOSPITAL | Age: 71
End: 2021-04-29
Attending: EMERGENCY MEDICINE
Payer: MEDICARE

## 2021-04-29 VITALS
DIASTOLIC BLOOD PRESSURE: 72 MMHG | WEIGHT: 287 LBS | OXYGEN SATURATION: 96 % | HEART RATE: 124 BPM | SYSTOLIC BLOOD PRESSURE: 102 MMHG | HEIGHT: 69 IN | BODY MASS INDEX: 42.51 KG/M2

## 2021-04-29 DIAGNOSIS — R07.9 ACUTE CHEST PAIN: Primary | ICD-10-CM

## 2021-04-29 DIAGNOSIS — R00.0 TACHYCARDIA, UNSPECIFIED: ICD-10-CM

## 2021-04-29 DIAGNOSIS — I25.10 CORONARY ARTERIOSCLEROSIS: ICD-10-CM

## 2021-04-29 DIAGNOSIS — R00.0 SINUS TACHYCARDIA: ICD-10-CM

## 2021-04-29 DIAGNOSIS — I10 BENIGN ESSENTIAL HYPERTENSION: Primary | ICD-10-CM

## 2021-04-29 DIAGNOSIS — E78.5 DYSLIPIDEMIA: ICD-10-CM

## 2021-04-29 PROBLEM — D72.829 LEUKOCYTOSIS: Status: ACTIVE | Noted: 2021-04-29

## 2021-04-29 PROBLEM — R73.9 HYPERGLYCEMIA: Status: ACTIVE | Noted: 2021-04-29

## 2021-04-29 LAB
BUN SERPL-MCNC: 21 MG/DL
BUN/CREAT SERPL: 17.8
CALCIUM SERPL-MCNC: 10.1 MG/DL
CHLORIDE SERPL-SCNC: 105 MMOL/L
CO2 SERPL-SCNC: 25 MMOL/L
CREAT SERPL-MCNC: 1.18 MG/DL
GLUCOSE SERPL-MCNC: 233 MG/DL
HCT VFR BLD CALC: 41.3 %
HGB BLD-MCNC: 13.4 G/DL
PLATELET # BLD: 456 K/MCL
POTASSIUM SERPL-SCNC: 3.6 MMOL/L
RBC # BLD: 4.2 10*6/UL
SODIUM SERPL-SCNC: 139 MMOL/L
WBC # BLD: 15 K/MCL

## 2021-04-29 PROCEDURE — 80048 BASIC METABOLIC PNL TOTAL CA: CPT | Performed by: EMERGENCY MEDICINE

## 2021-04-29 PROCEDURE — 93306 TTE W/DOPPLER COMPLETE: CPT | Performed by: NURSE PRACTITIONER

## 2021-04-29 PROCEDURE — 93005 ELECTROCARDIOGRAM TRACING: CPT

## 2021-04-29 PROCEDURE — 93010 ELECTROCARDIOGRAM REPORT: CPT | Performed by: NURSE PRACTITIONER

## 2021-04-29 PROCEDURE — 99285 EMERGENCY DEPT VISIT HI MDM: CPT

## 2021-04-29 PROCEDURE — 85025 COMPLETE CBC W/AUTO DIFF WBC: CPT | Performed by: EMERGENCY MEDICINE

## 2021-04-29 PROCEDURE — 93010 ELECTROCARDIOGRAM REPORT: CPT | Performed by: EMERGENCY MEDICINE

## 2021-04-29 PROCEDURE — 71260 CT THORAX DX C+: CPT | Performed by: EMERGENCY MEDICINE

## 2021-04-29 PROCEDURE — 81003 URINALYSIS AUTO W/O SCOPE: CPT | Performed by: EMERGENCY MEDICINE

## 2021-04-29 PROCEDURE — 85379 FIBRIN DEGRADATION QUANT: CPT | Performed by: EMERGENCY MEDICINE

## 2021-04-29 PROCEDURE — 96360 HYDRATION IV INFUSION INIT: CPT

## 2021-04-29 PROCEDURE — 99214 OFFICE O/P EST MOD 30 MIN: CPT | Performed by: INTERNAL MEDICINE

## 2021-04-29 PROCEDURE — 84484 ASSAY OF TROPONIN QUANT: CPT | Performed by: NURSE PRACTITIONER

## 2021-04-29 PROCEDURE — 84484 ASSAY OF TROPONIN QUANT: CPT | Performed by: EMERGENCY MEDICINE

## 2021-04-29 PROCEDURE — 93000 ELECTROCARDIOGRAM COMPLETE: CPT | Performed by: INTERNAL MEDICINE

## 2021-04-29 PROCEDURE — 3074F SYST BP LT 130 MM HG: CPT | Performed by: INTERNAL MEDICINE

## 2021-04-29 PROCEDURE — 93010 ELECTROCARDIOGRAM REPORT: CPT | Performed by: INTERNAL MEDICINE

## 2021-04-29 PROCEDURE — 96361 HYDRATE IV INFUSION ADD-ON: CPT

## 2021-04-29 PROCEDURE — 94660 CPAP INITIATION&MGMT: CPT

## 2021-04-29 PROCEDURE — 82962 GLUCOSE BLOOD TEST: CPT

## 2021-04-29 PROCEDURE — 71045 X-RAY EXAM CHEST 1 VIEW: CPT | Performed by: EMERGENCY MEDICINE

## 2021-04-29 PROCEDURE — 3078F DIAST BP <80 MM HG: CPT | Performed by: INTERNAL MEDICINE

## 2021-04-29 RX ORDER — AZELASTINE 1 MG/ML
1 SPRAY, METERED NASAL DAILY
Status: DISCONTINUED | OUTPATIENT
Start: 2021-04-30 | End: 2021-04-30

## 2021-04-29 RX ORDER — ALBUTEROL SULFATE 90 UG/1
2 AEROSOL, METERED RESPIRATORY (INHALATION) EVERY 4 HOURS PRN
Status: DISCONTINUED | OUTPATIENT
Start: 2021-04-29 | End: 2021-04-30

## 2021-04-29 RX ORDER — HYDROCHLOROTHIAZIDE 12.5 MG/1
12.5 CAPSULE, GELATIN COATED ORAL DAILY
Status: DISCONTINUED | OUTPATIENT
Start: 2021-04-30 | End: 2021-04-30

## 2021-04-29 RX ORDER — MONTELUKAST SODIUM 10 MG/1
10 TABLET ORAL DAILY
Status: DISCONTINUED | OUTPATIENT
Start: 2021-04-30 | End: 2021-04-30

## 2021-04-29 RX ORDER — ASPIRIN 81 MG/1
81 TABLET ORAL DAILY
Status: DISCONTINUED | OUTPATIENT
Start: 2021-04-30 | End: 2021-04-30

## 2021-04-29 RX ORDER — ATORVASTATIN CALCIUM 40 MG/1
40 TABLET, FILM COATED ORAL NIGHTLY
Status: DISCONTINUED | OUTPATIENT
Start: 2021-04-29 | End: 2021-04-30

## 2021-04-29 RX ORDER — DILTIAZEM HYDROCHLORIDE 240 MG/1
240 CAPSULE, COATED, EXTENDED RELEASE ORAL DAILY
Status: DISCONTINUED | OUTPATIENT
Start: 2021-04-30 | End: 2021-04-29

## 2021-04-29 RX ORDER — CALCIUM CARBONATE/VITAMIN D3 250-3.125
2 TABLET ORAL DAILY
Status: DISCONTINUED | OUTPATIENT
Start: 2021-04-30 | End: 2021-04-30

## 2021-04-29 RX ORDER — DILTIAZEM HYDROCHLORIDE 60 MG/1
60 TABLET, FILM COATED ORAL EVERY 6 HOURS SCHEDULED
Status: DISCONTINUED | OUTPATIENT
Start: 2021-04-29 | End: 2021-04-29

## 2021-04-29 RX ORDER — AZELASTINE 1 MG/ML
1 SPRAY, METERED NASAL DAILY
COMMUNITY
End: 2021-11-17

## 2021-04-29 RX ORDER — DILTIAZEM HYDROCHLORIDE 300 MG/1
300 CAPSULE, COATED, EXTENDED RELEASE ORAL DAILY
Status: DISCONTINUED | OUTPATIENT
Start: 2021-04-29 | End: 2021-04-30

## 2021-04-29 RX ORDER — ASCORBIC ACID 500 MG
1000 TABLET ORAL DAILY
Status: DISCONTINUED | OUTPATIENT
Start: 2021-04-30 | End: 2021-04-30

## 2021-04-29 ASSESSMENT — PATIENT HEALTH QUESTIONNAIRE - PHQ9
CLINICAL INTERPRETATION OF PHQ9 SCORE: NO FURTHER SCREENING NEEDED
1. LITTLE INTEREST OR PLEASURE IN DOING THINGS: NOT AT ALL
SUM OF ALL RESPONSES TO PHQ9 QUESTIONS 1 AND 2: 0
2. FEELING DOWN, DEPRESSED OR HOPELESS: NOT AT ALL
SUM OF ALL RESPONSES TO PHQ9 QUESTIONS 1 AND 2: 0
CLINICAL INTERPRETATION OF PHQ2 SCORE: NO FURTHER SCREENING NEEDED

## 2021-04-29 NOTE — ED QUICK NOTES
Orders for admission, patient is aware of plan and ready to go upstairs.  Any questions, please call ED ORALIA boston  at extension [phone]  Type of COVID test sent:  COVID Suspicion level: Low    Titratable drug(s) infusing:  Rate:    LOC at time of transport:x4

## 2021-04-29 NOTE — ED INITIAL ASSESSMENT (HPI)
Patient sent from cardiologist's office for chest pain and shortness of breath. Patient states this has been intermittently over the past 2 months but has now worsened.

## 2021-04-29 NOTE — CONSULTS
[facility]    Cardiology Consultation    Jimmy Dangelo Patient Status:  Emergency    1950 MRN [id number]   Location [address] Attending Carson Alarcon MD   [facility] Day # 0 PCP Cary Braden      about 37 years ago. Her smoking use included cigarettes. She started smoking about 52 years ago. She has a 9.00 pack-year smoking history.  She has never used smokeless tobacco.    Allergies:    Atorvastatin            ITCHING  Latex                   ITCHI 04/29/2021    .0 04/29/2021    CREATSERUM 1.18 04/29/2021    BUN 21 04/29/2021     04/29/2021    K 3.6 04/29/2021     04/29/2021    CO2 25.0 04/29/2021     04/29/2021    CA 10.1 04/29/2021    DDIMER 0.55 04/29/2021    TROP <0.045 continue.  HDL 60 and 2019 not at goal repeat fasting lipid panel tomorrow if LDL over 100 increase atorvastatin to 80 mg. Will follow. Thank you for allowing me to participate in the care of your patient.     MD Benjamin Kelsey

## 2021-04-30 VITALS
SYSTOLIC BLOOD PRESSURE: 132 MMHG | BODY MASS INDEX: 39.32 KG/M2 | WEIGHT: 265.5 LBS | DIASTOLIC BLOOD PRESSURE: 78 MMHG | HEART RATE: 71 BPM | TEMPERATURE: 98 F | RESPIRATION RATE: 16 BRPM | OXYGEN SATURATION: 95 % | HEIGHT: 69 IN

## 2021-04-30 PROCEDURE — 94640 AIRWAY INHALATION TREATMENT: CPT

## 2021-04-30 PROCEDURE — 99214 OFFICE O/P EST MOD 30 MIN: CPT | Performed by: NURSE PRACTITIONER

## 2021-04-30 PROCEDURE — 80061 LIPID PANEL: CPT | Performed by: INTERNAL MEDICINE

## 2021-04-30 PROCEDURE — 82962 GLUCOSE BLOOD TEST: CPT

## 2021-04-30 PROCEDURE — 84443 ASSAY THYROID STIM HORMONE: CPT | Performed by: INTERNAL MEDICINE

## 2021-04-30 RX ORDER — ATORVASTATIN CALCIUM 40 MG/1
40 TABLET, FILM COATED ORAL NIGHTLY
Status: DISCONTINUED | OUTPATIENT
Start: 2021-04-30 | End: 2021-04-30

## 2021-04-30 RX ORDER — ATORVASTATIN CALCIUM 40 MG/1
80 TABLET, FILM COATED ORAL NIGHTLY
Status: DISCONTINUED | OUTPATIENT
Start: 2021-04-30 | End: 2021-04-30

## 2021-04-30 RX ORDER — DILTIAZEM HYDROCHLORIDE 300 MG/1
300 CAPSULE, COATED, EXTENDED RELEASE ORAL DAILY
Qty: 30 CAPSULE | Refills: 5 | Status: SHIPPED | OUTPATIENT
Start: 2021-04-30 | End: 2022-01-14

## 2021-04-30 NOTE — PLAN OF CARE
No acute complications over night. Uses CPAP at night. EF 75%. Started eliquis and PO cardizem. Plan for discharge when medically cleared.  Call light and belongings within reach    Problem: Patient Centered Care  Goal: Patient preferences are identified a

## 2021-04-30 NOTE — PLAN OF CARE
No acute changes, no pain. Up self. Expect discharge home for self care when medically cleared.     Problem: Patient Centered Care  Goal: Patient preferences are identified and integrated in the patient's plan of care  Description: Interventions:  - What wo

## 2021-04-30 NOTE — PROGRESS NOTES
[facility]    Progress Note    [facility] Patient Status:  Observation    1950 MRN [id number]   [facility] 3W/SW Attending [address], [address] Day # 0 PCP Maricruz Braden       Assessment and P or lesions    Scheduled Meds:   • atorvastatin  40 mg Oral Nightly   • apixaban  5 mg Oral BID   • dilTIAZem  300 mg Oral Daily   • Insulin Aspart Pen  1-7 Units Subcutaneous TID CC   • vitamin C  1,000 mg Oral Daily   • aspirin  81 mg Oral Daily   • Rosalba MD on 4/29/2021 at 3:42 PM          EKG 12-LEAD    Result Date: 4/29/2021  ECG Report  Interpretation  --------------------------     EKG 12-LEAD    Result Date: 4/29/2021  ECG Report  Interpretation  --------------------------     EKG 12-LEAD    Result Da

## 2021-04-30 NOTE — H&P
[facility]    PATIENT'S NAME: Xiomara Hemal   ATTENDING PHYSICIAN: Alejandro Cleaning MD   PATIENT ACCOUNT#:   [id number]    LOCATION:  [address] [place] [address] #:   [id number]       YOB: 1950  ADMISSION DATE:       04/29/202 NEUROLOGIC:  Higher functions are normal.  No focal neurological deficits. LABORATORY DATA:  Troponin was negative. CTE of the chest showed no evidence of embolism or dissection. ASSESSMENT AND PLAN:    1. Chest pain.  Rule out coronary

## 2021-04-30 NOTE — PROGRESS NOTES
Pt is aox4, admitted to floor, home meds reconciled. Admission orders received. Pt wears CPAP at night, denies any pain or SOB at this time.

## 2021-04-30 NOTE — DIETARY NOTE
NUTRITION EDUCATION NOTE    Received consult for DM, weight loss and low sodium nutrition education. Appropriate education and handout provided. See education section of Epic for specifics.     RECOMMENDATIONS TO MD:  Order follow up in [employer]

## 2021-05-02 NOTE — DISCHARGE SUMMARY
[facility]    PATIENT'S NAME: Jp Jessica   ATTENDING PHYSICIAN: Ting Lezama MD   PATIENT ACCOUNT#:   [id number]    LOCATION:  [address] [place] [address] #:   [id number]       YOB: 1950  ADMISSION DATE:       04/29/202

## 2021-05-03 ENCOUNTER — OFFICE VISIT (OUTPATIENT)
Dept: PAIN CLINIC | Facility: HOSPITAL | Age: 71
End: 2021-05-03
Attending: NURSE PRACTITIONER
Payer: MEDICARE

## 2021-05-03 VITALS — OXYGEN SATURATION: 98 % | HEART RATE: 79 BPM | DIASTOLIC BLOOD PRESSURE: 83 MMHG | SYSTOLIC BLOOD PRESSURE: 140 MMHG

## 2021-05-03 DIAGNOSIS — M54.41 CHRONIC BILATERAL LOW BACK PAIN WITH BILATERAL SCIATICA: Primary | ICD-10-CM

## 2021-05-03 DIAGNOSIS — G89.29 CHRONIC BILATERAL LOW BACK PAIN WITH BILATERAL SCIATICA: Primary | ICD-10-CM

## 2021-05-03 DIAGNOSIS — M54.42 CHRONIC BILATERAL LOW BACK PAIN WITH BILATERAL SCIATICA: Primary | ICD-10-CM

## 2021-05-03 DIAGNOSIS — M48.061 SPINAL STENOSIS OF LUMBAR REGION WITHOUT NEUROGENIC CLAUDICATION: ICD-10-CM

## 2021-05-03 PROCEDURE — 99211 OFF/OP EST MAY X REQ PHY/QHP: CPT

## 2021-05-03 NOTE — PAYOR COMM NOTE
--------------  DISCHARGE REVIEW    Payor: [facility] [place] #:  [id number]  Authorization Number: [id number]    Admit date: N/A  Admit time:  N/A  Discharge Date: 4/30/2021  3:51 PM     Admitting Physician: [facility], patient is discharged to home and recommended to follow with PCP and also the cardiologist as outpatient with 1 week.      Dictated By Chikis Hauser MD  d: 04/30/2021 15:16:36  t: 05/02/2021 09:34:34  [facility] [id number]  /

## 2021-05-03 NOTE — CHRONIC PAIN
Follow-up Note  CC: injection follow up   HISTORY OF PRESENT ILLNESS:  Eva Feliicano is a 69year old old female, originally referred to the pain clinic by Dr. Webb ref.  provider found, with history of Chronic bilateral low back pain with bilateral sciatica • fenofibrate micronized 200 MG Oral Cap Take 200 mg by mouth every morning before breakfast.     • Cetirizine HCl 10 MG Oral Cap Take by mouth 2 (two) times daily. • Calcium Carbonate-Vitamin D 600-125 MG-UNIT Oral Tab Take by mouth daily.      • Asc COLONOSCOPY, POSSIBLE BIOPSY, POSSIBLE POLYPECTOMY 80163;  Surgeon: Manuelito Cannon MD;  Location: [address] [place] [address]   • KNEE REPLACEMENT SURGERY         FAMILY HISTORY:  No family history on file.     SOCIAL HISTORY:  Social History    Socioeconomi CARE PLANNING:    Advance Care Plan NOT discussed.   PHYSICAL EXAMINATION:   05/03/21  1004   BP: 140/83   Pulse: 79      General: Alert and oriented x3, NAD, appears stated age, appropriate disposition and demeanor, answers questions appropriately   Head: discussion. All questions were answered during extended questions and answer session. Patient agreeable to discussion plan.  Greater than 50% of the time was spent with counseling (nature of discussion centered around pain, therapy, and treatment options),

## 2021-05-03 NOTE — PROGRESS NOTES
PT presents ambulatory to CPM.  PT reports 60% improvement. She feels more flexible and can but polish her toenails and ties her shoe but still has back pain that increases with ADLs. TARUN Randolph saw PT for LESI F/U. See notes for POC.

## 2021-05-05 ENCOUNTER — TELEPHONE (OUTPATIENT)
Dept: SCHEDULING | Age: 71
End: 2021-05-05

## 2021-05-05 DIAGNOSIS — M54.17 RADICULOPATHY, LUMBOSACRAL REGION: ICD-10-CM

## 2021-05-05 RX ORDER — APIXABAN 5 MG/1
TABLET, FILM COATED ORAL
COMMUNITY
Start: 2021-04-30

## 2021-05-05 RX ORDER — PREGABALIN 50 MG/1
CAPSULE ORAL
Qty: 90 CAPSULE | Refills: 0 | Status: SHIPPED | OUTPATIENT
Start: 2021-05-05 | End: 2021-05-07 | Stop reason: SDUPTHER

## 2021-05-07 ENCOUNTER — OFFICE VISIT (OUTPATIENT)
Dept: CARDIOLOGY CLINIC | Facility: HOSPITAL | Age: 71
End: 2021-05-07
Attending: OBSTETRICS & GYNECOLOGY
Payer: MEDICARE

## 2021-05-07 ENCOUNTER — OFFICE VISIT (OUTPATIENT)
Dept: UROLOGY | Facility: HOSPITAL | Age: 71
End: 2021-05-07
Attending: OBSTETRICS & GYNECOLOGY
Payer: MEDICARE

## 2021-05-07 VITALS
DIASTOLIC BLOOD PRESSURE: 71 MMHG | HEART RATE: 75 BPM | SYSTOLIC BLOOD PRESSURE: 127 MMHG | BODY MASS INDEX: 40 KG/M2 | WEIGHT: 271 LBS | OXYGEN SATURATION: 96 %

## 2021-05-07 VITALS
BODY MASS INDEX: 42.51 KG/M2 | HEIGHT: 69 IN | WEIGHT: 287 LBS | DIASTOLIC BLOOD PRESSURE: 72 MMHG | SYSTOLIC BLOOD PRESSURE: 128 MMHG

## 2021-05-07 DIAGNOSIS — N81.84 PELVIC MUSCLE WASTING: ICD-10-CM

## 2021-05-07 DIAGNOSIS — I48.0 PAF (PAROXYSMAL ATRIAL FIBRILLATION) (HCC): Primary | ICD-10-CM

## 2021-05-07 DIAGNOSIS — M54.17 RADICULOPATHY, LUMBOSACRAL REGION: ICD-10-CM

## 2021-05-07 DIAGNOSIS — N39.3 FEMALE STRESS INCONTINENCE: ICD-10-CM

## 2021-05-07 DIAGNOSIS — N39.41 URGE INCONTINENCE: Primary | ICD-10-CM

## 2021-05-07 DIAGNOSIS — I48.91 ATRIAL FIBRILLATION (HCC): ICD-10-CM

## 2021-05-07 DIAGNOSIS — N95.2 POSTMENOPAUSAL ATROPHIC VAGINITIS: ICD-10-CM

## 2021-05-07 DIAGNOSIS — R35.1 NOCTURIA: ICD-10-CM

## 2021-05-07 PROBLEM — E66.9 OBESITY: Status: ACTIVE | Noted: 2021-05-07

## 2021-05-07 PROCEDURE — 99204 OFFICE O/P NEW MOD 45 MIN: CPT | Performed by: CLINICAL NURSE SPECIALIST

## 2021-05-07 PROCEDURE — 99212 OFFICE O/P EST SF 10 MIN: CPT

## 2021-05-07 PROCEDURE — 99203 OFFICE O/P NEW LOW 30 MIN: CPT | Performed by: CLINICAL NURSE SPECIALIST

## 2021-05-07 PROCEDURE — 87077 CULTURE AEROBIC IDENTIFY: CPT | Performed by: OBSTETRICS & GYNECOLOGY

## 2021-05-07 PROCEDURE — 87086 URINE CULTURE/COLONY COUNT: CPT | Performed by: OBSTETRICS & GYNECOLOGY

## 2021-05-07 PROCEDURE — 81002 URINALYSIS NONAUTO W/O SCOPE: CPT

## 2021-05-07 PROCEDURE — 93010 ELECTROCARDIOGRAM REPORT: CPT | Performed by: CLINICAL NURSE SPECIALIST

## 2021-05-07 PROCEDURE — 81002 URINALYSIS NONAUTO W/O SCOPE: CPT | Performed by: OBSTETRICS & GYNECOLOGY

## 2021-05-07 PROCEDURE — 93005 ELECTROCARDIOGRAM TRACING: CPT

## 2021-05-07 NOTE — PROGRESS NOTES
Ahmet Puente,   5/7/2021     Referred by Dr. Janneth Carroll  Pt here with self    Patient presents with:  Urge Incontinence  Urinary Urgency      HPI:  No RAMÓN  + UUI  Nocturia x1-2  Denies prolapse sx  Not sexually active ,n/a dyspareunia  Reg bowels    PRIO Soln, Inhale 2 puffs into the lungs every 4 (four) hours as needed. , Disp: , Rfl:   albuterol sulfate 0.63 MG/3ML Inhalation Nebu Soln, Take 3 mL by nebulization every 6 (six) hours as needed. , Disp: , Rfl:   Fluticasone Furoate-Vilanterol (BREO ELLIPTA) 1 effort  HEART:  RRR  ABDOMEN: soft, no mass, no hernia  EXTREM:  Normal, no edema  SKIN:  Normal, no lesions    PELVIC EXAM:  Ext.  Gen: +atrophy, no lesions  Urethra: +atrophy, nontender  Bladder:+fullness, nontender  Vagina: +atrophy  Cervix: no bleeding,

## 2021-05-07 NOTE — PROGRESS NOTES
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  If your heart rate is more than 90 beats/min and or you feel more t

## 2021-05-07 NOTE — PATIENT INSTRUCTIONS
Continue current medications    Please start monitoring your heart rate and blood pressure every day.  If your heart rate is more than 90 beats/min and or you feel more tired, short of breath, or palpitations, please call the [address]

## 2021-05-10 ENCOUNTER — TELEPHONE (OUTPATIENT)
Dept: UROLOGY | Facility: HOSPITAL | Age: 71
End: 2021-05-10

## 2021-05-10 RX ORDER — PREGABALIN 50 MG/1
50 CAPSULE ORAL 3 TIMES DAILY
Qty: 90 CAPSULE | Refills: 0 | Status: SHIPPED | OUTPATIENT
Start: 2021-05-10 | End: 2021-05-12 | Stop reason: SDUPTHER

## 2021-05-10 NOTE — TELEPHONE ENCOUNTER
TC to pt f/u test results  Ucx from 5/07/2021 shows 10,000-50,000 GBS  Normal Christina. Pt informed. No tx necessary at this time. All questions answered. Pt understands and agrees to plan. Encouraged to call with questions or concerns.

## 2021-05-12 ENCOUNTER — TELEPHONE (OUTPATIENT)
Dept: SCHEDULING | Age: 71
End: 2021-05-12

## 2021-05-12 DIAGNOSIS — M54.17 RADICULOPATHY, LUMBOSACRAL REGION: ICD-10-CM

## 2021-05-12 RX ORDER — PREGABALIN 50 MG/1
50 CAPSULE ORAL 3 TIMES DAILY
Qty: 90 CAPSULE | Refills: 0 | OUTPATIENT
Start: 2021-05-12

## 2021-05-13 DIAGNOSIS — K21.9 GASTROESOPHAGEAL REFLUX DISEASE WITHOUT ESOPHAGITIS: ICD-10-CM

## 2021-05-13 RX ORDER — MIRABEGRON 50 MG/1
TABLET, FILM COATED, EXTENDED RELEASE ORAL
COMMUNITY
Start: 2021-05-10

## 2021-05-14 RX ORDER — OMEPRAZOLE 40 MG/1
CAPSULE, DELAYED RELEASE ORAL
Qty: 180 CAPSULE | Refills: 1 | Status: SHIPPED | OUTPATIENT
Start: 2021-05-14 | End: 2021-05-18

## 2021-05-17 ENCOUNTER — OFFICE VISIT (OUTPATIENT)
Dept: OTOLARYNGOLOGY | Facility: CLINIC | Age: 71
End: 2021-05-17
Payer: COMMERCIAL

## 2021-05-17 VITALS
BODY MASS INDEX: 40.14 KG/M2 | HEIGHT: 69 IN | WEIGHT: 271 LBS | DIASTOLIC BLOOD PRESSURE: 81 MMHG | SYSTOLIC BLOOD PRESSURE: 138 MMHG | TEMPERATURE: 98 F

## 2021-05-17 DIAGNOSIS — R13.14 PHARYNGOESOPHAGEAL DYSPHAGIA: ICD-10-CM

## 2021-05-17 DIAGNOSIS — H92.03 REFERRED OTALGIA OF BOTH EARS: Primary | ICD-10-CM

## 2021-05-17 PROCEDURE — 3079F DIAST BP 80-89 MM HG: CPT | Performed by: OTOLARYNGOLOGY

## 2021-05-17 PROCEDURE — 99203 OFFICE O/P NEW LOW 30 MIN: CPT | Performed by: OTOLARYNGOLOGY

## 2021-05-17 PROCEDURE — 3075F SYST BP GE 130 - 139MM HG: CPT | Performed by: OTOLARYNGOLOGY

## 2021-05-17 PROCEDURE — 3008F BODY MASS INDEX DOCD: CPT | Performed by: OTOLARYNGOLOGY

## 2021-05-17 PROCEDURE — 1111F DSCHRG MED/CURRENT MED MERGE: CPT | Performed by: OTOLARYNGOLOGY

## 2021-05-17 RX ORDER — OMEPRAZOLE 40 MG/1
CAPSULE, DELAYED RELEASE ORAL
COMMUNITY
Start: 2021-05-14

## 2021-05-17 RX ORDER — NEOMYCIN SULFATE, POLYMYXIN B SULFATE AND HYDROCORTISONE 10; 3.5; 1 MG/ML; MG/ML; [USP'U]/ML
3 SUSPENSION/ DROPS AURICULAR (OTIC) 2 TIMES DAILY
Qty: 1 BOTTLE | Refills: 1 | Status: SHIPPED | OUTPATIENT
Start: 2021-05-17 | End: 2021-05-27

## 2021-05-17 RX ORDER — MIRABEGRON 50 MG/1
1 TABLET, FILM COATED, EXTENDED RELEASE ORAL DAILY
COMMUNITY
Start: 2021-05-10 | End: 2021-06-11 | Stop reason: ALTCHOICE

## 2021-05-17 RX ORDER — PREGABALIN 50 MG/1
50 CAPSULE ORAL 3 TIMES DAILY
COMMUNITY
Start: 2021-05-12 | End: 2021-05-20

## 2021-05-17 NOTE — PROGRESS NOTES
Bren Kirkland is a 69year old female. Patient presents with:  Ear Problem: c/o bilateral ear pain for a while      HISTORY OF PRESENT ILLNESS  5/17/2021  Patient presents for evaluation of concerns over intermittent choking.   She states for many years sh POLYPECTOMY 76197;  Surgeon: Katie Zepeda MD;  Location: [address] [place] [address] Neg/Pos Details   Constitutional Negative Fatigue, fever and weight loss. ENMT Negative Drooling.    E Outpatient Medications:   •  Mirabegron ER (MYRBETRIQ) 50 MG Oral Tablet 24 Hr, , Disp: , Rfl:   •  Omeprazole 40 MG Oral Capsule Delayed Release, TAKE 1 CAPSULE BY MOUTH TWICE DAILY, Disp: , Rfl:   •  pregabalin 50 MG Oral Cap, Take 50 mg by mouth., Disp: Fluticasone Propionate 50 MCG/ACT Nasal Suspension, 1 spray by Nasal route daily. , Disp: , Rfl:   •  Montelukast Sodium 10 MG Oral Tab, Take 10 mg by mouth daily. , Disp: , Rfl:   •  aspirin 81 MG Oral Tab, Take 81 mg by mouth daily.   , Disp: , Rfl:     AS

## 2021-05-18 DIAGNOSIS — K21.9 GASTROESOPHAGEAL REFLUX DISEASE WITHOUT ESOPHAGITIS: ICD-10-CM

## 2021-05-18 RX ORDER — OMEPRAZOLE 40 MG/1
CAPSULE, DELAYED RELEASE ORAL
Qty: 180 CAPSULE | Refills: 1 | Status: SHIPPED | OUTPATIENT
Start: 2021-05-18 | End: 2022-07-05

## 2021-05-20 ENCOUNTER — APPOINTMENT (OUTPATIENT)
Dept: CARDIOLOGY | Age: 71
End: 2021-05-20

## 2021-05-20 ENCOUNTER — LAB ENCOUNTER (OUTPATIENT)
Dept: LAB | Facility: HOSPITAL | Age: 71
End: 2021-05-20
Attending: Other
Payer: MEDICARE

## 2021-05-20 ENCOUNTER — OFFICE VISIT (OUTPATIENT)
Dept: NEUROLOGY | Facility: CLINIC | Age: 71
End: 2021-05-20
Payer: COMMERCIAL

## 2021-05-20 VITALS
HEIGHT: 70 IN | BODY MASS INDEX: 39.37 KG/M2 | DIASTOLIC BLOOD PRESSURE: 88 MMHG | WEIGHT: 275 LBS | HEART RATE: 72 BPM | SYSTOLIC BLOOD PRESSURE: 118 MMHG

## 2021-05-20 DIAGNOSIS — R20.2 FORMICATION: Primary | ICD-10-CM

## 2021-05-20 DIAGNOSIS — R20.2 FORMICATION: ICD-10-CM

## 2021-05-20 DIAGNOSIS — G62.9 POLYNEUROPATHY: ICD-10-CM

## 2021-05-20 PROCEDURE — 3008F BODY MASS INDEX DOCD: CPT | Performed by: OTHER

## 2021-05-20 PROCEDURE — 84425 ASSAY OF VITAMIN B-1: CPT

## 2021-05-20 PROCEDURE — 86334 IMMUNOFIX E-PHORESIS SERUM: CPT

## 2021-05-20 PROCEDURE — 86225 DNA ANTIBODY NATIVE: CPT

## 2021-05-20 PROCEDURE — 82175 ASSAY OF ARSENIC: CPT

## 2021-05-20 PROCEDURE — 83825 ASSAY OF MERCURY: CPT

## 2021-05-20 PROCEDURE — 36415 COLL VENOUS BLD VENIPUNCTURE: CPT

## 2021-05-20 PROCEDURE — 86235 NUCLEAR ANTIGEN ANTIBODY: CPT

## 2021-05-20 PROCEDURE — 86038 ANTINUCLEAR ANTIBODIES: CPT

## 2021-05-20 PROCEDURE — 83655 ASSAY OF LEAD: CPT

## 2021-05-20 PROCEDURE — 83876 ASSAY MYELOPEROXIDASE: CPT

## 2021-05-20 PROCEDURE — 83516 IMMUNOASSAY NONANTIBODY: CPT

## 2021-05-20 PROCEDURE — 99205 OFFICE O/P NEW HI 60 MIN: CPT | Performed by: OTHER

## 2021-05-20 PROCEDURE — 84446 ASSAY OF VITAMIN E: CPT

## 2021-05-20 PROCEDURE — 86039 ANTINUCLEAR ANTIBODIES (ANA): CPT

## 2021-05-20 PROCEDURE — 86255 FLUORESCENT ANTIBODY SCREEN: CPT

## 2021-05-20 PROCEDURE — 82746 ASSAY OF FOLIC ACID SERUM: CPT

## 2021-05-20 PROCEDURE — 82550 ASSAY OF CK (CPK): CPT

## 2021-05-20 PROCEDURE — 3074F SYST BP LT 130 MM HG: CPT | Performed by: OTHER

## 2021-05-20 PROCEDURE — 3079F DIAST BP 80-89 MM HG: CPT | Performed by: OTHER

## 2021-05-20 PROCEDURE — 82300 ASSAY OF CADMIUM: CPT

## 2021-05-20 RX ORDER — PREGABALIN 100 MG/1
100 CAPSULE ORAL 3 TIMES DAILY
Qty: 270 CAPSULE | Refills: 3 | Status: SHIPPED | OUTPATIENT
Start: 2021-05-20 | End: 2021-12-15 | Stop reason: DRUGHIGH

## 2021-05-20 NOTE — PROGRESS NOTES
[facility]  [address]  [phone]          [facility]  NEW [facility]IENT EVALUATION  Reason for Admission/Consultation:  Formications     Requested by: Nirmal Purcell  PCP: cant almost fall standing on flat surfaces bc of her balance. Balance is not worse in the dark.     Admits to cramping in her hands;  States her hand will become stiff; reports her little finger and first finger will become stiff; also has cramping in her Rfl: 1  •  apixaban 5 MG Oral Tab, Take 1 tablet (5 mg total) by mouth 2 (two) times daily. , Disp: 60 tablet, Rfl: 5  •  Azelastine HCl 0.1 % Nasal Solution, 1 spray by Nasal route daily. , Disp: , Rfl:   •  atorvastatin 40 MG Oral Tab, Take 40 mg by mouth 7/13/2020    Procedure: COLONOSCOPY, POSSIBLE BIOPSY, POSSIBLE POLYPECTOMY 40658;  Surgeon: Mai Yousif MD;  Location: [address] [place] [address]   • KNEE REPLACEMENT SURGERY         History reviewed. No pertinent family history.     Social history:    S Motor:  normal tone, normal bulk. No interosseous wasting. No flattening of hypothenar eminences.        Right Left     Motor Strength   Deltoids 5 4+  Triceps 5 4+  Biceps 4+ 5  Wrist Extensors   5 5   Hip Flexors 4+ 5   Knee extensors 5 5  Knee flexor 25.0 04/29/2021      I have reviewed labs.     Performed an independent visualization of: No imaging to review  Imaging revealed:   No imaging to review          Mine Session is a 69year old RH woman w/ a pmhx of lumbar radiculopathy, newly diagnosed pafi use her home dose first before she fills the prescription because of the cost of medication. When she runs out of the medication she has at home that she will fill the new prescription. 2. Continue to control her diabetes.   Her last hemoglobin A1c from A

## 2021-05-20 NOTE — PATIENT INSTRUCTIONS
Your symptoms are due to diabetic polyneuropathy. We can go up on the dose of your lyrica to 100mg three times a day. Let me know if it makes you drowsy. Please go to the lab to get bloodwork to look for other causes of a neuropathy.   Diabetic Neuropathy · Indigestion  · Diarrhea, constipation, or uncontrolled loss of poop (feces)  · Dizziness, especially when standing up  · Bladder infections  · Erectile dysfunction  · Vaginal dryness  · Weakness  · Weight loss  · Depression and sleep problems  · Visual problems, or urinary or sexual problems. What are possible complications of diabetic neuropathy? Possible complications include:  · Bladder problems. In some cases, the nerves of the bladder become damaged.  They no longer respond normally as the bladder them from growing and leaking. Other times, 1 or more of the nerves that move your eyes in a coordinated way may have a loss of blood supply, such as during a stroke. That can cause double vision.  Also, injury to the nerves that control the diameter of you results could mean. · Know what to expect if you do not take the medicine or have the test or procedure. · If you have a follow-up appointment, write down the date, time, and purpose for that visit.   · Know how you can contact your provider if you have q of peripheral neuropathy  Some common causes of peripheral neuropathy include:  · Diabetes or other endocrine disorders  · Toxins (such as alcohol)  · Nutritional deficiencies (such as Vitamin B-12)  · Kidney disease  · Injury  · Repetitive stress (such as with your elbow before stepping in. Also, always wear shoes to prevent injury. Regular foot care  If you have foot numbness, you may not notice cutting yourself while trimming your nails.  To prevent problems, your healthcare provider may ask you to visit of medicines, injections, TENS (transcutaneous electrical nerve stimulation), acupuncture, massage, and other methods. Rebel last reviewed this educational content on 1/1/2018  © 4743-6920 [employer] [facility]. All rights reserved.  This informatio alert or to react quickly may be impaired by this medicine. Do not drive or operate machinery until you know how this medicine will affect you. Do not drink beverages with alcohol while on this medicine. Family should check on the patient often.  Call the breathing, skin rash, itching, swelling, or severe dizziness. If you notice any of these symptoms, seek medical help quickly. Extra  Please speak with your doctor, nurse, or pharmacist if you have any questions about this medicine. [email or web address]

## 2021-05-24 ENCOUNTER — OFFICE VISIT (OUTPATIENT)
Dept: UROLOGY | Facility: HOSPITAL | Age: 71
End: 2021-05-24
Attending: OBSTETRICS & GYNECOLOGY
Payer: MEDICARE

## 2021-05-24 VITALS
HEIGHT: 70 IN | SYSTOLIC BLOOD PRESSURE: 136 MMHG | BODY MASS INDEX: 39.37 KG/M2 | DIASTOLIC BLOOD PRESSURE: 62 MMHG | WEIGHT: 275 LBS | RESPIRATION RATE: 20 BRPM | TEMPERATURE: 99 F

## 2021-05-24 DIAGNOSIS — N39.41 URGE INCONTINENCE: Primary | ICD-10-CM

## 2021-05-24 PROCEDURE — 51741 ELECTRO-UROFLOWMETRY FIRST: CPT

## 2021-05-24 PROCEDURE — 51729 CYSTOMETROGRAM W/VP&UP: CPT

## 2021-05-24 PROCEDURE — 51784 ANAL/URINARY MUSCLE STUDY: CPT

## 2021-05-24 PROCEDURE — 51797 INTRAABDOMINAL PRESSURE TEST: CPT

## 2021-05-24 PROCEDURE — 81002 URINALYSIS NONAUTO W/O SCOPE: CPT

## 2021-05-24 NOTE — PROCEDURES
Patient here for urodynamic testing. Procedure explained and confirmed by patient. See evaluation form for results. Both verbal and written discharge instructions were given.   Patient tolerated procedure well and will follow up with Dr. Fab Haddad (six) hours as needed. , Disp: , Rfl:   Fluticasone Furoate-Vilanterol (BREO ELLIPTA) 100-25 MCG/INH Inhalation Aerosol Powder, Breath Activated, Inhale 1 puff into the lungs daily. , Disp: 3 each, Rfl: 3  fenofibrate micronized 200 MG Oral Cap, Take 200 mg decreased to 40 ml/min during testing.    Temp:  Room  Position:  [x]  Sit  []  Stand  []  Supine  First sensation:   59 mL  First desire to void:   126 mL  Strong desire to void:  156 mL  Maximum cystometric capacity:   187 mL  Detrusor Activity:  [x]  Uns

## 2021-05-24 NOTE — PATIENT INSTRUCTIONS
[address] [place], [place] [address]  [facility]: [phone]       Urodynamic Testing Discharge Instructions: There are NO dietary or activity restrictions. You may resume your normal schedule.       You supplement.      _____Fiber Supplement  (Metamucil, Citrucel, Benefiber, etc)    Begin with 1 dose (as instructed on the package) every morning.     If the stool is too hard, or if the stool consists of small, hard, marbles, you may need to increase to 1 do to urinate. The catheter will be removed and you will be able to go to the bathroom to empty your bladder. Will the catheter need to be replaced? You will need to urinate a specific amount, depending on how much you were initially able to hold.   Your PELVIC MEDICINE     Post-Operative Guidelines    · AVOID CONSTIPATION - Take Miralax: one capful in water or juice each morning. You can also take each evening if needed. · No lifting over 10 lbs.  (1 gallon of milk is 8 lbs.)  · It is okay to walk up and

## 2021-05-25 ENCOUNTER — TELEPHONE (OUTPATIENT)
Dept: SCHEDULING | Age: 71
End: 2021-05-25

## 2021-05-25 ENCOUNTER — APPOINTMENT (OUTPATIENT)
Dept: LAB | Facility: HOSPITAL | Age: 71
End: 2021-05-25
Attending: EMERGENCY MEDICINE
Payer: MEDICARE

## 2021-05-25 ENCOUNTER — LAB ENCOUNTER (OUTPATIENT)
Dept: LAB | Facility: HOSPITAL | Age: 71
End: 2021-05-25
Attending: OTOLARYNGOLOGY
Payer: MEDICARE

## 2021-05-25 VITALS
WEIGHT: 231 LBS | BODY MASS INDEX: 41.03 KG/M2 | OXYGEN SATURATION: 97 % | BODY MASS INDEX: 34.21 KG/M2 | WEIGHT: 286.6 LBS | BODY MASS INDEX: 39.89 KG/M2 | TEMPERATURE: 97.3 F | WEIGHT: 278.66 LBS | HEIGHT: 70 IN | DIASTOLIC BLOOD PRESSURE: 83 MMHG | TEMPERATURE: 97.9 F | HEIGHT: 70 IN | SYSTOLIC BLOOD PRESSURE: 146 MMHG | HEART RATE: 70 BPM | HEIGHT: 69 IN | DIASTOLIC BLOOD PRESSURE: 60 MMHG | DIASTOLIC BLOOD PRESSURE: 84 MMHG | SYSTOLIC BLOOD PRESSURE: 159 MMHG | HEART RATE: 81 BPM | SYSTOLIC BLOOD PRESSURE: 150 MMHG | HEART RATE: 96 BPM | OXYGEN SATURATION: 97 %

## 2021-05-25 DIAGNOSIS — R13.14 PHARYNGOESOPHAGEAL DYSPHAGIA: ICD-10-CM

## 2021-05-28 ENCOUNTER — HOSPITAL ENCOUNTER (OUTPATIENT)
Dept: GENERAL RADIOLOGY | Facility: HOSPITAL | Age: 71
Discharge: HOME | End: 2021-05-28
Attending: OTOLARYNGOLOGY
Payer: MEDICARE

## 2021-05-28 DIAGNOSIS — R13.14 PHARYNGOESOPHAGEAL DYSPHAGIA: ICD-10-CM

## 2021-05-28 PROCEDURE — 74220 X-RAY XM ESOPHAGUS 1CNTRST: CPT | Performed by: OTOLARYNGOLOGY

## 2021-05-29 ENCOUNTER — TELEPHONE (OUTPATIENT)
Dept: SCHEDULING | Age: 71
End: 2021-05-29

## 2021-06-03 ENCOUNTER — OFFICE VISIT (OUTPATIENT)
Dept: CARDIOLOGY | Age: 71
End: 2021-06-03

## 2021-06-03 VITALS — OXYGEN SATURATION: 97 % | SYSTOLIC BLOOD PRESSURE: 136 MMHG | HEART RATE: 100 BPM | DIASTOLIC BLOOD PRESSURE: 70 MMHG

## 2021-06-03 DIAGNOSIS — I25.10 CORONARY ARTERIOSCLEROSIS: ICD-10-CM

## 2021-06-03 DIAGNOSIS — I10 BENIGN ESSENTIAL HYPERTENSION: Primary | ICD-10-CM

## 2021-06-03 DIAGNOSIS — E78.5 DYSLIPIDEMIA: ICD-10-CM

## 2021-06-03 PROCEDURE — 99214 OFFICE O/P EST MOD 30 MIN: CPT | Performed by: INTERNAL MEDICINE

## 2021-06-03 PROCEDURE — 3075F SYST BP GE 130 - 139MM HG: CPT | Performed by: INTERNAL MEDICINE

## 2021-06-03 PROCEDURE — 3078F DIAST BP <80 MM HG: CPT | Performed by: INTERNAL MEDICINE

## 2021-06-08 DIAGNOSIS — I25.10 CORONARY ARTERIOSCLEROSIS: ICD-10-CM

## 2021-06-08 PROCEDURE — 93000 ELECTROCARDIOGRAM COMPLETE: CPT | Performed by: INTERNAL MEDICINE

## 2021-06-10 RX ORDER — FLUTICASONE FUROATE 100 UG/1
1 POWDER RESPIRATORY (INHALATION)
COMMUNITY
Start: 2021-06-03

## 2021-06-11 ENCOUNTER — OFFICE VISIT (OUTPATIENT)
Dept: UROLOGY | Facility: HOSPITAL | Age: 71
End: 2021-06-11
Attending: OBSTETRICS & GYNECOLOGY
Payer: MEDICARE

## 2021-06-11 ENCOUNTER — OFFICE VISIT (OUTPATIENT)
Dept: PAIN CLINIC | Facility: HOSPITAL | Age: 71
End: 2021-06-11
Attending: OBSTETRICS & GYNECOLOGY
Payer: MEDICARE

## 2021-06-11 DIAGNOSIS — N39.41 URGE INCONTINENCE: ICD-10-CM

## 2021-06-11 DIAGNOSIS — R35.1 NOCTURIA: ICD-10-CM

## 2021-06-11 DIAGNOSIS — M48.061 SPINAL STENOSIS OF LUMBAR REGION WITHOUT NEUROGENIC CLAUDICATION: ICD-10-CM

## 2021-06-11 DIAGNOSIS — M54.42 CHRONIC BILATERAL LOW BACK PAIN WITH BILATERAL SCIATICA: Primary | ICD-10-CM

## 2021-06-11 DIAGNOSIS — G89.29 CHRONIC BILATERAL LOW BACK PAIN WITH BILATERAL SCIATICA: Primary | ICD-10-CM

## 2021-06-11 DIAGNOSIS — M54.41 CHRONIC BILATERAL LOW BACK PAIN WITH BILATERAL SCIATICA: Primary | ICD-10-CM

## 2021-06-11 DIAGNOSIS — N32.81 DETRUSOR INSTABILITY: Primary | ICD-10-CM

## 2021-06-11 PROCEDURE — 99211 OFF/OP EST MAY X REQ PHY/QHP: CPT

## 2021-06-11 PROCEDURE — 99212 OFFICE O/P EST SF 10 MIN: CPT

## 2021-06-11 RX ORDER — TROSPIUM CHLORIDE ER 60 MG/1
60 CAPSULE ORAL DAILY
Qty: 90 CAPSULE | Refills: 3 | Status: SHIPPED | OUTPATIENT
Start: 2021-06-11 | End: 2021-09-23

## 2021-06-11 NOTE — CHRONIC PAIN
Follow-up Note  CC: MRI follow up   HISTORY OF PRESENT ILLNESS:  Elliott Billing is a 69year old old female, originally referred to the pain clinic by Dr. Webb ref.  provider found, with history of Chronic bilateral low back pain with bilateral sciatica  (prim (two) times daily. • fenofibrate micronized 200 MG Oral Cap Take 200 mg by mouth every morning before breakfast.     • Cetirizine HCl 10 MG Oral Cap Take by mouth 2 (two) times daily.      • Calcium Carbonate-Vitamin D 600-125 MG-UNIT Oral Tab Take by m 7/13/2020    Procedure: COLONOSCOPY, POSSIBLE BIOPSY, POSSIBLE POLYPECTOMY 82555;  Surgeon: Katty Barnes MD;  Location: [address] [place] [address]   • KNEE REPLACEMENT SURGERY         FAMILY HISTORY:  History reviewed. No pertinent family history.     [facility] Exercise per Session:   Stress:       Feeling of Stress :   Social Connections:       Frequency of Communication with Friends and Family:       Frequency of Social Gatherings with Friends and Family:       Attends [employer] Services:       Active Member of prior exam.   L4-5 there is circumferential disc osteophyte complex with moderate to severe bilateral neural foraminal narrowing with bilateral recess narrowing with exuberant facet hypertrophy changes.  This likely encroach on the exiting l5 for nerve root patient information and discussing the care with the patients health care providers.

## 2021-06-11 NOTE — PROGRESS NOTES
Pt presents to [facility] ambulatory for MRI review. Pt had MRI done on 5/18 at [facility]. Pt states having low back pain mainly on the R side radiating to R side of the abdomen. Pt noticed that change for the past week.  Pt is using Aleve for p

## 2021-06-11 NOTE — PROGRESS NOTES
Pt presents w/ initial c/o UI  Urodynamic testing undergone without complication.   Results reviewed with patient  54/5cc & 204/1cc  Sonny Monroy 187cc  DO @ 130cc  No RAMÓN    Discussed with patient mgmt options for DO/UUI    Biggest bother is UUI  Bowels improved wi

## 2021-06-15 ENCOUNTER — OFFICE VISIT (OUTPATIENT)
Dept: CARDIOLOGY | Age: 71
End: 2021-06-15

## 2021-06-15 VITALS
OXYGEN SATURATION: 96 % | HEIGHT: 69 IN | BODY MASS INDEX: 39.99 KG/M2 | SYSTOLIC BLOOD PRESSURE: 140 MMHG | DIASTOLIC BLOOD PRESSURE: 90 MMHG | WEIGHT: 270 LBS | HEART RATE: 63 BPM

## 2021-06-15 DIAGNOSIS — E13.9 DIABETES 1.5, MANAGED AS TYPE 2 (CMD): ICD-10-CM

## 2021-06-15 DIAGNOSIS — E78.00 PURE HYPERCHOLESTEROLEMIA: ICD-10-CM

## 2021-06-15 DIAGNOSIS — I10 BENIGN ESSENTIAL HYPERTENSION: Primary | ICD-10-CM

## 2021-06-15 DIAGNOSIS — I48.0 PAROXYSMAL ATRIAL FIBRILLATION (CMD): ICD-10-CM

## 2021-06-15 DIAGNOSIS — G47.33 OSA (OBSTRUCTIVE SLEEP APNEA): ICD-10-CM

## 2021-06-15 PROCEDURE — 99214 OFFICE O/P EST MOD 30 MIN: CPT | Performed by: INTERNAL MEDICINE

## 2021-06-15 PROCEDURE — 3077F SYST BP >= 140 MM HG: CPT | Performed by: INTERNAL MEDICINE

## 2021-06-15 RX ORDER — FLECAINIDE ACETATE 100 MG/1
100 TABLET ORAL 2 TIMES DAILY
Qty: 60 TABLET | Refills: 11 | Status: SHIPPED | OUTPATIENT
Start: 2021-06-15

## 2021-06-15 RX ORDER — TROSPIUM CHLORIDE ER 60 MG/1
60 CAPSULE ORAL DAILY
COMMUNITY
Start: 2021-06-11

## 2021-06-15 ASSESSMENT — PATIENT HEALTH QUESTIONNAIRE - PHQ9
CLINICAL INTERPRETATION OF PHQ2 SCORE: NO FURTHER SCREENING NEEDED
CLINICAL INTERPRETATION OF PHQ9 SCORE: NO FURTHER SCREENING NEEDED
1. LITTLE INTEREST OR PLEASURE IN DOING THINGS: NOT AT ALL
SUM OF ALL RESPONSES TO PHQ9 QUESTIONS 1 AND 2: 0
2. FEELING DOWN, DEPRESSED OR HOPELESS: NOT AT ALL
SUM OF ALL RESPONSES TO PHQ9 QUESTIONS 1 AND 2: 0

## 2021-06-16 ENCOUNTER — DOCUMENTATION ONLY (OUTPATIENT)
Dept: PAIN CLINIC | Facility: HOSPITAL | Age: 71
End: 2021-06-16

## 2021-06-16 NOTE — PROGRESS NOTES
Procedure code 98228--8906/25/2021 APPROVED      [facility]--- PA needed via [facility] web portal   Clinicals submitted on portal   Case has been approved      Alicia Turner # [id number]

## 2021-06-21 ENCOUNTER — TELEPHONE (OUTPATIENT)
Dept: UROLOGY | Facility: HOSPITAL | Age: 71
End: 2021-06-21

## 2021-06-21 NOTE — TELEPHONE ENCOUNTER
Patient calling nurseline with concerns for yeast infection  Reports vaginal discomfort/irritation  Has urgency/frequency at baseline, unsure if UTI s/sx  Denies abnormal discharge  Recommended pt come in to office to collect cultures today  All questions

## 2021-06-22 ENCOUNTER — TELEPHONE (OUTPATIENT)
Dept: UROLOGY | Facility: HOSPITAL | Age: 71
End: 2021-06-22

## 2021-06-22 ENCOUNTER — OFFICE VISIT (OUTPATIENT)
Dept: PODIATRY CLINIC | Facility: CLINIC | Age: 71
End: 2021-06-22
Payer: COMMERCIAL

## 2021-06-22 ENCOUNTER — OFFICE VISIT (OUTPATIENT)
Dept: UROLOGY | Facility: HOSPITAL | Age: 71
End: 2021-06-22
Attending: OBSTETRICS & GYNECOLOGY
Payer: MEDICARE

## 2021-06-22 VITALS — BODY MASS INDEX: 40.14 KG/M2 | HEIGHT: 69 IN | WEIGHT: 271 LBS | TEMPERATURE: 99 F | RESPIRATION RATE: 20 BRPM

## 2021-06-22 DIAGNOSIS — B35.1 ONYCHOMYCOSIS: ICD-10-CM

## 2021-06-22 DIAGNOSIS — E11.42 TYPE 2 DIABETES MELLITUS WITH DIABETIC POLYNEUROPATHY, WITHOUT LONG-TERM CURRENT USE OF INSULIN (HCC): Primary | ICD-10-CM

## 2021-06-22 DIAGNOSIS — N89.8 VAGINAL DISCHARGE: ICD-10-CM

## 2021-06-22 DIAGNOSIS — N39.41 URGE INCONTINENCE: Primary | ICD-10-CM

## 2021-06-22 PROCEDURE — 87205 SMEAR GRAM STAIN: CPT

## 2021-06-22 PROCEDURE — 87808 TRICHOMONAS ASSAY W/OPTIC: CPT

## 2021-06-22 PROCEDURE — 99203 OFFICE O/P NEW LOW 30 MIN: CPT | Performed by: PODIATRIST

## 2021-06-22 PROCEDURE — 87106 FUNGI IDENTIFICATION YEAST: CPT

## 2021-06-22 PROCEDURE — 81002 URINALYSIS NONAUTO W/O SCOPE: CPT

## 2021-06-22 PROCEDURE — 87086 URINE CULTURE/COLONY COUNT: CPT

## 2021-06-22 PROCEDURE — 51701 INSERT BLADDER CATHETER: CPT

## 2021-06-22 NOTE — PROGRESS NOTES
Patient here for UTI screen and genital vaginosis screen, as discussed yesterday with office PA Yoni). Patient reports vaginal discomfort and itching for the past two days. Unsure if she has a UTI. Denies dysuria. Reports frequency and urgency.   Using s

## 2021-06-22 NOTE — TELEPHONE ENCOUNTER
Pt called stating that she did not hear from us yesterday when evelin Persaud spoke with patient. Shannon JADE spoke with patient and requested she come to office to collect cultures yesterday. Pt did not show in office yesterday.   Pt called this am

## 2021-06-22 NOTE — PROGRESS NOTES
HPI:    Patient ID: Shahrzad Whitmore is a 69year old female. This pleasant 59-year-old female presents as a new patient to me and its my impression she is self-referred. The reason for this visit is for a diabetic foot evaluation.   She has concerns particu MG-UNIT Oral Tab Take by mouth daily. • Ascorbic Acid (VITAMIN C) 1000 MG Oral Tab Take 1,000 mg by mouth daily. • Albuterol Sulfate 108 (90 Base) MCG/ACT Inhalation Aerosol Powder, Breath Activated Inhale 2 puffs into the lungs as needed.        • that she is well-informed she indicates an understanding of my instructions plan follow-up in 1 year but she was encouraged to call immediately with any foot related concerns           ASSESSMENT/PLAN:   Type 2 diabetes mellitus with diabetic polyneuropath

## 2021-06-24 ENCOUNTER — TELEPHONE (OUTPATIENT)
Dept: UROLOGY | Facility: HOSPITAL | Age: 71
End: 2021-06-24

## 2021-06-24 DIAGNOSIS — B37.3 CANDIDAL VULVOVAGINITIS: Primary | ICD-10-CM

## 2021-06-24 DIAGNOSIS — E11.9 TYPE 2 DIABETES MELLITUS (HCC): Primary | ICD-10-CM

## 2021-06-24 RX ORDER — FLUCONAZOLE 150 MG/1
TABLET ORAL
Qty: 2 TABLET | Refills: 0 | Status: SHIPPED | OUTPATIENT
Start: 2021-06-24 | End: 2021-09-23 | Stop reason: ALTCHOICE

## 2021-06-24 NOTE — TELEPHONE ENCOUNTER
TC to pt following test results  No answer, LM  Genital swab shows yeast, pt informed  Diflucan sent to pt's preferred pharmacy  Asked to please call with questions or concerns

## 2021-06-25 ENCOUNTER — TELEPHONE (OUTPATIENT)
Dept: SCHEDULING | Age: 71
End: 2021-06-25

## 2021-06-28 ENCOUNTER — DOCUMENTATION ONLY (OUTPATIENT)
Dept: PAIN CLINIC | Facility: HOSPITAL | Age: 71
End: 2021-06-28

## 2021-06-28 NOTE — PROGRESS NOTES
Procedure code 53968--8807/07/2021    [facility]--- [facility] rcv'd via web portal     auth # [id number]

## 2021-06-29 ENCOUNTER — TELEPHONE (OUTPATIENT)
Dept: UROLOGY | Facility: HOSPITAL | Age: 71
End: 2021-06-29

## 2021-06-29 NOTE — TELEPHONE ENCOUNTER
Patient called stating she didn't receive a call with the results of her urine culture or the vaginal swab obtained on 06/22/2021. Informed patient that a detailed message was left by our office on 06/24/2021 with the final results of all labs. Medications were sent on 06/22/2021 to her preferred pharmacy for positive genital vaginal swab for candida. Instructed patient to  prescription as ordered on 06/22/2021. Patient verbalized understanding. Encouraged to call with questions or concerns.

## 2021-07-01 ENCOUNTER — TELEPHONE (OUTPATIENT)
Dept: ENDOCRINOLOGY | Facility: HOSPITAL | Age: 71
End: 2021-07-01

## 2021-07-02 ENCOUNTER — HOSPITAL ENCOUNTER (OUTPATIENT)
Dept: ENDOCRINOLOGY | Facility: HOSPITAL | Age: 71
Discharge: HOME | End: 2021-07-02
Attending: INTERNAL MEDICINE
Payer: MEDICARE

## 2021-07-02 DIAGNOSIS — E11.9 TYPE 2 DIABETES MELLITUS WITHOUT COMPLICATION, WITHOUT LONG-TERM CURRENT USE OF INSULIN (HCC): Primary | ICD-10-CM

## 2021-07-02 DIAGNOSIS — E11.9 TYPE 2 DIABETES MELLITUS (HCC): ICD-10-CM

## 2021-07-02 NOTE — PROGRESS NOTES
Margarita Urias  : 1950 was seen for Step 1 Individual Diabetic Education Initial Visit    Date: 2021   Start time: 11qm End time: 12 pm      Assessment: There were no vitals taken for this visit.     HgbA1C (%)   Date Value   2019 6.4 (H)

## 2021-07-08 ENCOUNTER — TELEPHONE (OUTPATIENT)
Dept: SCHEDULING | Age: 71
End: 2021-07-08

## 2021-07-08 DIAGNOSIS — E11.9 CONTROLLED TYPE 2 DIABETES MELLITUS WITHOUT COMPLICATION, WITHOUT LONG-TERM CURRENT USE OF INSULIN (CMD): ICD-10-CM

## 2021-07-08 RX ORDER — EMPAGLIFLOZIN 25 MG/1
TABLET, FILM COATED ORAL
Qty: 90 TABLET | Refills: 1 | Status: SHIPPED | OUTPATIENT
Start: 2021-07-08

## 2021-07-09 ENCOUNTER — OFFICE VISIT (OUTPATIENT)
Dept: INTERNAL MEDICINE | Age: 71
End: 2021-07-09

## 2021-07-09 VITALS
HEIGHT: 69 IN | HEART RATE: 73 BPM | DIASTOLIC BLOOD PRESSURE: 70 MMHG | WEIGHT: 268.96 LBS | SYSTOLIC BLOOD PRESSURE: 130 MMHG | BODY MASS INDEX: 39.84 KG/M2 | TEMPERATURE: 97.8 F | RESPIRATION RATE: 18 BRPM

## 2021-07-09 DIAGNOSIS — I48.0 PAROXYSMAL ATRIAL FIBRILLATION (CMD): Primary | ICD-10-CM

## 2021-07-09 DIAGNOSIS — M48.07 SPINAL STENOSIS, LUMBOSACRAL REGION: ICD-10-CM

## 2021-07-09 DIAGNOSIS — R32 URINARY INCONTINENCE, UNSPECIFIED TYPE: ICD-10-CM

## 2021-07-09 DIAGNOSIS — E66.01 MORBID OBESITY (CMD): ICD-10-CM

## 2021-07-09 DIAGNOSIS — J45.20 MILD INTERMITTENT ASTHMA WITHOUT COMPLICATION: ICD-10-CM

## 2021-07-09 DIAGNOSIS — I10 BENIGN ESSENTIAL HYPERTENSION: ICD-10-CM

## 2021-07-09 DIAGNOSIS — E78.5 DYSLIPIDEMIA: ICD-10-CM

## 2021-07-09 PROBLEM — E78.00 PURE HYPERCHOLESTEROLEMIA: Status: RESOLVED | Noted: 2021-06-15 | Resolved: 2021-07-09

## 2021-07-09 PROCEDURE — 99215 OFFICE O/P EST HI 40 MIN: CPT | Performed by: INTERNAL MEDICINE

## 2021-07-09 PROCEDURE — 3075F SYST BP GE 130 - 139MM HG: CPT | Performed by: INTERNAL MEDICINE

## 2021-07-09 PROCEDURE — 3078F DIAST BP <80 MM HG: CPT | Performed by: INTERNAL MEDICINE

## 2021-07-09 RX ORDER — ACETAMINOPHEN AND CODEINE PHOSPHATE 300; 60 MG/1; MG/1
1 TABLET ORAL EVERY 4 HOURS PRN
Qty: 90 TABLET | Refills: 1 | Status: SHIPPED | OUTPATIENT
Start: 2021-07-09

## 2021-07-09 ASSESSMENT — PATIENT HEALTH QUESTIONNAIRE - PHQ9
SUM OF ALL RESPONSES TO PHQ9 QUESTIONS 1 AND 2: 0
CLINICAL INTERPRETATION OF PHQ9 SCORE: NO FURTHER SCREENING NEEDED
1. LITTLE INTEREST OR PLEASURE IN DOING THINGS: NOT AT ALL
SUM OF ALL RESPONSES TO PHQ9 QUESTIONS 1 AND 2: 0
CLINICAL INTERPRETATION OF PHQ2 SCORE: NO FURTHER SCREENING NEEDED
2. FEELING DOWN, DEPRESSED OR HOPELESS: NOT AT ALL

## 2021-07-09 ASSESSMENT — COGNITIVE AND FUNCTIONAL STATUS - GENERAL
DO YOU HAVE DIFFICULTY DRESSING OR BATHING: NO
DO YOU HAVE SERIOUS DIFFICULTY WALKING OR CLIMBING STAIRS: NO
BECAUSE OF A PHYSICAL, MENTAL, OR EMOTIONAL CONDITION, DO YOU HAVE SERIOUS DIFFICULTY CONCENTRATING, REMEMBERING OR MAKING DECISIONS: NO
BECAUSE OF A PHYSICAL, MENTAL, OR EMOTIONAL CONDITION, DO YOU HAVE DIFFICULTY DOING ERRANDS ALONE: NO

## 2021-07-11 ASSESSMENT — ENCOUNTER SYMPTOMS
HEADACHES: 0
DIZZINESS: 0
TROUBLE SWALLOWING: 1
BRUISES/BLEEDS EASILY: 0
ENDOCRINE NEGATIVE: 1
BACK PAIN: 1
WHEEZING: 1
PSYCHIATRIC NEGATIVE: 1
UNEXPECTED WEIGHT CHANGE: 1
EYES NEGATIVE: 1

## 2021-07-12 ENCOUNTER — VIRTUAL PHONE E/M (OUTPATIENT)
Dept: UROLOGY | Facility: HOSPITAL | Age: 71
End: 2021-07-12
Attending: STUDENT IN AN ORGANIZED HEALTH CARE EDUCATION/TRAINING PROGRAM
Payer: MEDICARE

## 2021-07-12 DIAGNOSIS — R35.0 URINARY FREQUENCY: ICD-10-CM

## 2021-07-12 DIAGNOSIS — N32.81 DETRUSOR INSTABILITY: Primary | ICD-10-CM

## 2021-07-12 DIAGNOSIS — R39.15 URINARY URGENCY: ICD-10-CM

## 2021-07-12 DIAGNOSIS — N39.41 URGE URINARY INCONTINENCE: ICD-10-CM

## 2021-07-12 NOTE — PROGRESS NOTES
Patient presents to follow up DO/UUI  Given circumstances surrounding COVID-19 this visit is being conducted as a televisit with pt's consent.     She is currently using Trospium XR 60 mg    She reports 50% improvement in urinary leakage  Denies dry mouth o

## 2021-07-22 ENCOUNTER — OFFICE VISIT (OUTPATIENT)
Dept: PAIN CLINIC | Facility: HOSPITAL | Age: 71
End: 2021-07-22
Attending: NURSE PRACTITIONER
Payer: MEDICARE

## 2021-07-22 VITALS — SYSTOLIC BLOOD PRESSURE: 122 MMHG | DIASTOLIC BLOOD PRESSURE: 81 MMHG | HEART RATE: 97 BPM | OXYGEN SATURATION: 94 %

## 2021-07-22 DIAGNOSIS — G89.29 CHRONIC BILATERAL LOW BACK PAIN WITH BILATERAL SCIATICA: Primary | ICD-10-CM

## 2021-07-22 DIAGNOSIS — M54.41 CHRONIC BILATERAL LOW BACK PAIN WITH BILATERAL SCIATICA: Primary | ICD-10-CM

## 2021-07-22 DIAGNOSIS — M48.061 SPINAL STENOSIS OF LUMBAR REGION WITHOUT NEUROGENIC CLAUDICATION: ICD-10-CM

## 2021-07-22 DIAGNOSIS — M54.42 CHRONIC BILATERAL LOW BACK PAIN WITH BILATERAL SCIATICA: Primary | ICD-10-CM

## 2021-07-22 PROCEDURE — 99211 OFF/OP EST MAY X REQ PHY/QHP: CPT

## 2021-07-22 NOTE — PROGRESS NOTES
PT presents ambulatory to the [facility].  Pt reports no improvement. TARUN Randolph saw PT for R Trans MAC F/U. See notes for POC.

## 2021-07-22 NOTE — CHRONIC PAIN
Follow-up Note  CC: MRI follow up   HISTORY OF PRESENT ILLNESS:  Jimmy Pierson is a 69year old old female, originally referred to the pain clinic by Dr. Webb ref.  provider found, with history of Chronic bilateral low back pain with bilateral sciatica  (prim • pregabalin 100 MG Oral Cap Take 1 capsule (100 mg total) by mouth 3 (three) times daily.  270 capsule 3   • Omeprazole 40 MG Oral Capsule Delayed Release TAKE 1 CAPSULE BY MOUTH TWICE DAILY     • dilTIAZem HCl ER Coated Beads 300 MG Oral Capsule SR 24 H claudication     Chronic bilateral low back pain with sciatica     Leukocytosis     Hyperglycemia     Acute chest pain     Sinus tachycardia     PAF (paroxysmal atrial fibrillation) (HCC)     Obesity    Past Medical History:   Diagnosis Date   • Back probl Bike Helmet: Not Asked        Seat Belt: Not Asked        Self-Exams: Not Asked    Social History Narrative      Not on file    Social Determinants of Health  Financial Resource Strain:       Difficulty of Paying Living Expenses:   Food Insecurity:       [facility]   T12-L1 unremarkable   L1-2 unremarkable   L2-3 there is a circumferential disc osteophyte complex with moderate to severe bilateral neural foraminal narrowing.  Mild bilateral lateral recess narrowing and moderate facet hypertrophy injections, and further testing. Risks and benefits of all options were discussed at length to patients satisfaction during a comprehensive interactive discussion. All questions were answered during extended questions and answer session.  Patient agreeable

## 2021-07-27 ENCOUNTER — DOCUMENTATION ONLY (OUTPATIENT)
Dept: PAIN CLINIC | Facility: HOSPITAL | Age: 71
End: 2021-07-27

## 2021-07-27 NOTE — PROGRESS NOTES
Procedure code 92722---36/13/2021 Marquez-- [place] needed via [facility] ER web portal   Clinicals submitted via web portal     auth # [id number]

## 2021-07-29 ENCOUNTER — TELEPHONE (OUTPATIENT)
Dept: NEUROLOGY | Facility: CLINIC | Age: 71
End: 2021-07-29

## 2021-07-29 ENCOUNTER — OFFICE VISIT (OUTPATIENT)
Dept: NEUROLOGY | Facility: CLINIC | Age: 71
End: 2021-07-29
Payer: COMMERCIAL

## 2021-07-29 VITALS
DIASTOLIC BLOOD PRESSURE: 64 MMHG | HEIGHT: 69 IN | BODY MASS INDEX: 39.69 KG/M2 | SYSTOLIC BLOOD PRESSURE: 122 MMHG | WEIGHT: 268 LBS

## 2021-07-29 DIAGNOSIS — R20.2 FORMICATION: ICD-10-CM

## 2021-07-29 DIAGNOSIS — G62.9 POLYNEUROPATHY: Primary | ICD-10-CM

## 2021-07-29 DIAGNOSIS — R29.898 HAND WEAKNESS: ICD-10-CM

## 2021-07-29 DIAGNOSIS — M48.061 SPINAL STENOSIS OF LUMBAR REGION WITHOUT NEUROGENIC CLAUDICATION: ICD-10-CM

## 2021-07-29 PROCEDURE — 3078F DIAST BP <80 MM HG: CPT | Performed by: OTHER

## 2021-07-29 PROCEDURE — 99214 OFFICE O/P EST MOD 30 MIN: CPT | Performed by: OTHER

## 2021-07-29 PROCEDURE — 3008F BODY MASS INDEX DOCD: CPT | Performed by: OTHER

## 2021-07-29 PROCEDURE — 3074F SYST BP LT 130 MM HG: CPT | Performed by: OTHER

## 2021-07-29 NOTE — TELEPHONE ENCOUNTER
Contacted [facility] online Case/Reference # [id number] to initiate authorization for C-Spine MRI CPT 79235    Status: Approved-This member's benefit plan did not require a prior authorization for this request.     LM informing patient of the above, also sent myc

## 2021-07-29 NOTE — PROGRESS NOTES
[facility]  [address]  [phone]        Neurology Clinic Follow Up Note    Chief Complaint:  Neurologic Problem (LOV 5/20/21. C/o intermittent pain to extremities.  Mainly feet and hands, occa sciatica  She would to the bathroom and cry at night so her kids would not hear her  This was in 2009    Formications are in her arms,legs; spares hands.     In a class for diet and DM; disheartened to discover she has to limit the number of fruits she eats CAPSULE BY MOUTH TWICE DAILY   • pregabalin (LYRICA) 100 mg, Oral, 3 times daily   • Trospium Chloride ER (SANCTURA XR) 60 mg, Oral, Daily   • vitamin C 1,000 mg, Daily        Reviewed and assessed      Objective:    Last vitals and weight :  Body mass ind previous visit (from the past 36 hour(s)).     Diagnostic studies:  Performed an independent visualization of -  Imaging revealed:  -    Assessment     Tania Lucas is a 66year old RH woman w/ a pmhx of lumbar radiculopathy, newly diagnosed pafib, DM, HTN

## 2021-07-29 NOTE — PATIENT INSTRUCTIONS
We will go up on the lyrica to 200mg twice a day. I am ordering an MRI of your cervical spine because of your hand weakness.

## 2021-07-30 ENCOUNTER — TELEPHONE (OUTPATIENT)
Dept: SCHEDULING | Age: 71
End: 2021-07-30

## 2021-07-30 DIAGNOSIS — I10 BENIGN ESSENTIAL HYPERTENSION: ICD-10-CM

## 2021-07-31 ENCOUNTER — TELEPHONE (OUTPATIENT)
Dept: SCHEDULING | Age: 71
End: 2021-07-31

## 2021-08-02 RX ORDER — HYDROCHLOROTHIAZIDE 12.5 MG/1
12.5 CAPSULE, GELATIN COATED ORAL DAILY
Qty: 90 CAPSULE | Refills: 3 | Status: SHIPPED | OUTPATIENT
Start: 2021-08-02 | End: 2022-09-03

## 2021-08-05 ENCOUNTER — HOSPITAL ENCOUNTER (OUTPATIENT)
Dept: ENDOCRINOLOGY | Facility: HOSPITAL | Age: 71
Discharge: HOME | End: 2021-08-05
Attending: INTERNAL MEDICINE
Payer: MEDICARE

## 2021-08-05 DIAGNOSIS — E11.9 TYPE 2 DIABETES MELLITUS WITHOUT COMPLICATION, WITHOUT LONG-TERM CURRENT USE OF INSULIN (HCC): Primary | ICD-10-CM

## 2021-08-05 NOTE — PROGRESS NOTES
Mirtha Hernandez  [id number] attended Step 2 Group Diabetes Education Class:    Date: 8/5/2021  Start time: 56  End time: 1500    Wt:  -  Pt came late to class, did not weigh    Diabetes Overview, pathophysiology, pre-diabetes, A1C results and treatment o

## 2021-08-13 ENCOUNTER — SURGERY CENTER DOCUMENTATION (OUTPATIENT)
Dept: SURGERY | Age: 71
End: 2021-08-13

## 2021-08-13 ENCOUNTER — TELEPHONE (OUTPATIENT)
Dept: UROLOGY | Facility: HOSPITAL | Age: 71
End: 2021-08-13

## 2021-08-13 NOTE — PROCEDURES
[facility]  Operative Report  2021     Bernard Sharpe Patient Status:  No patient class for patient encounter    1950 MRN [id number]   Location [address] [place] [address] Attending No att. providers found     PCP Win Biswas     I

## 2021-08-13 NOTE — TELEPHONE ENCOUNTER
TC from [place] with c/o severe constipation and abdominal fullness and perineal pain with sitting . Pt was taking Trospium XL 60 mg daily until yesterday due to the side effects of the medication.   Pt stated that she last moved her bowels yesterday with st

## 2021-08-19 ENCOUNTER — APPOINTMENT (OUTPATIENT)
Dept: ENDOCRINOLOGY | Facility: HOSPITAL | Age: 71
End: 2021-08-19
Attending: INTERNAL MEDICINE
Payer: MEDICARE

## 2021-08-20 ENCOUNTER — TELEPHONE (OUTPATIENT)
Dept: CARDIOLOGY | Age: 71
End: 2021-08-20

## 2021-08-27 ENCOUNTER — OFFICE VISIT (OUTPATIENT)
Dept: OBGYN CLINIC | Facility: CLINIC | Age: 71
End: 2021-08-27
Payer: COMMERCIAL

## 2021-08-27 VITALS
SYSTOLIC BLOOD PRESSURE: 129 MMHG | BODY MASS INDEX: 40 KG/M2 | DIASTOLIC BLOOD PRESSURE: 79 MMHG | WEIGHT: 271.38 LBS | HEART RATE: 79 BPM

## 2021-08-27 DIAGNOSIS — R23.2 HOT FLASHES: ICD-10-CM

## 2021-08-27 DIAGNOSIS — N76.0 VAGINITIS AND VULVOVAGINITIS: Primary | ICD-10-CM

## 2021-08-27 PROBLEM — R32 URINARY INCONTINENCE: Status: ACTIVE | Noted: 2019-11-20

## 2021-08-27 PROBLEM — I48.0 PAROXYSMAL ATRIAL FIBRILLATION (HCC): Status: ACTIVE | Noted: 2021-05-07

## 2021-08-27 PROBLEM — K21.9 GASTROESOPHAGEAL REFLUX DISEASE WITHOUT ESOPHAGITIS: Status: ACTIVE | Noted: 2019-02-19

## 2021-08-27 PROBLEM — E78.5 DYSLIPIDEMIA: Status: ACTIVE | Noted: 2021-08-27

## 2021-08-27 PROCEDURE — 3074F SYST BP LT 130 MM HG: CPT | Performed by: ADVANCED PRACTICE MIDWIFE

## 2021-08-27 PROCEDURE — 99213 OFFICE O/P EST LOW 20 MIN: CPT | Performed by: ADVANCED PRACTICE MIDWIFE

## 2021-08-27 PROCEDURE — 3078F DIAST BP <80 MM HG: CPT | Performed by: ADVANCED PRACTICE MIDWIFE

## 2021-08-27 RX ORDER — EMPAGLIFLOZIN 25 MG/1
TABLET, FILM COATED ORAL
COMMUNITY
Start: 2021-07-08

## 2021-08-27 RX ORDER — ACETAMINOPHEN AND CODEINE PHOSPHATE 60; 300 MG/1; MG/1
1 TABLET ORAL EVERY 4 HOURS PRN
COMMUNITY
Start: 2021-07-10 | End: 2021-11-17

## 2021-08-27 RX ORDER — ACETAMINOPHEN AND CODEINE PHOSPHATE 300; 60 MG/1; MG/1
1 TABLET ORAL
COMMUNITY
Start: 2021-07-09 | End: 2021-09-23

## 2021-08-27 RX ORDER — ESCITALOPRAM OXALATE 10 MG/1
10 TABLET ORAL DAILY
Qty: 30 TABLET | Refills: 11 | Status: SHIPPED | OUTPATIENT
Start: 2021-08-27 | End: 2021-09-23

## 2021-08-27 NOTE — PATIENT INSTRUCTIONS
Lexapro Oral Tablet 10 mg  Uses  This medicine is used for the following purposes:  · anxiety  · depression  · eating disorders  · menopausal symptoms  · obsessive compulsive disorder  · post-traumatic stress disorder  Instructions  This medicine may be pregnant, planning to be pregnant, or breastfeeding. Ask your pharmacist if this medicine can interact with any of your other medicines. Be sure to tell them about all the medicines you take.   Do not start or stop any other medicines without first speakin medicine. [email or web address]://[email or web address] [email or web address]. [email or web address]/[email or web address]  IMPORTANT NOTE: This document tells you briefly how to take your medicine, but it does not tell you all there is to know about it. Your doctor or pharmacist may give you other documents about your m menopause  Menopause occurs if you have your uterus removed. But symptoms of menopause happen only if the ovaries are also removed. This causes a quick drop in the amount of estrogen and progesterone. This drop causes sudden and severe symptoms.    Acacia Caldwell history of osteoporosis may also increase risk. Heart disease. Estrogen made by the body seems to protect against heart disease. It may do this by raising the level of HDL (good) cholesterol in the blood.  After menopause, the risk for heart disease rises

## 2021-08-27 NOTE — PROGRESS NOTES
Patient presents with: Other: Per pt her urogyne told her she needed to see a ob/gyne. Per pt also has hot flashes       HPI:   Gail Sales is 66year old , here today with concern for intermittent vaginal itching.  None present at this time but has had yeas atorvastatin 40 MG Oral Tab Take 40 mg by mouth nightly. • metFORMIN HCl 1000 MG Oral Tab Take 1,000 mg by mouth 2 (two) times daily.      • fenofibrate micronized 200 MG Oral Cap Take 200 mg by mouth every morning before breakfast.     • Calcium Carb ITCHING    ROS:  Review of Systems   Constitutional: Negative for chills, diaphoresis and fever. Always feels hot since age 53   Genitourinary: Positive for vaginal discharge (see HPI). Psychiatric/Behavioral: Negative.         PHYSICAL EXAM:   08/

## 2021-08-29 LAB
GENITAL VAGINOSIS SCREEN: NEGATIVE
TRICHOMONAS SCREEN: NEGATIVE

## 2021-08-30 ENCOUNTER — HOSPITAL ENCOUNTER (OUTPATIENT)
Dept: MRI IMAGING | Facility: HOSPITAL | Age: 71
Discharge: HOME | End: 2021-08-30
Attending: Other
Payer: MEDICARE

## 2021-08-30 DIAGNOSIS — R20.2 FORMICATION: ICD-10-CM

## 2021-08-30 DIAGNOSIS — R29.898 HAND WEAKNESS: ICD-10-CM

## 2021-08-30 PROCEDURE — 72141 MRI NECK SPINE W/O DYE: CPT | Performed by: OTHER

## 2021-09-09 ENCOUNTER — OFFICE VISIT (OUTPATIENT)
Dept: NEUROLOGY | Facility: CLINIC | Age: 71
End: 2021-09-09
Payer: COMMERCIAL

## 2021-09-09 VITALS
HEART RATE: 63 BPM | DIASTOLIC BLOOD PRESSURE: 82 MMHG | SYSTOLIC BLOOD PRESSURE: 130 MMHG | BODY MASS INDEX: 40.29 KG/M2 | HEIGHT: 69 IN | WEIGHT: 272 LBS

## 2021-09-09 DIAGNOSIS — M48.02 NEURAL FORAMINAL STENOSIS OF CERVICAL SPINE: Primary | ICD-10-CM

## 2021-09-09 PROCEDURE — 3008F BODY MASS INDEX DOCD: CPT | Performed by: OTHER

## 2021-09-09 PROCEDURE — 99214 OFFICE O/P EST MOD 30 MIN: CPT | Performed by: OTHER

## 2021-09-09 PROCEDURE — 3075F SYST BP GE 130 - 139MM HG: CPT | Performed by: OTHER

## 2021-09-09 PROCEDURE — 3079F DIAST BP 80-89 MM HG: CPT | Performed by: OTHER

## 2021-09-09 NOTE — PROGRESS NOTES
[facility]  [address] [facility], [address] [place] [address]  [phone]        Neurology Clinic Follow Up Note    Chief Complaint:  Neurologic Problem (Delayne Marlon Pt is here for 6wk f/u for neck and shoulder.  Pt verbalize that sh has constant neck soreness  When she rotates her head she gets a sharp pain that radiates downward; this is chronic. When she carries a heavy pot her hand fatigues and she has to put it down. No pain in her neck or back when she coughs.     Sensation of b puff, Inhalation, Daily   • Glucose Blood (CONTOUR NEXT TEST) In Vitro Strip 1 each, Other, Daily, Check blood sugar one time daily   • HYDROCHLOROTHIAZIDE OR 12.5 mg, Oral, Daily   • metFORMIN HCl (GLUCOPHAGE) 1,000 mg, Oral, 2 times daily   • Microlet La (fatigues)  Hip Flexors 5 4  Knee extensors 5 5  Knee flexors 5 5        Reflexes:    C5 C6 C7  L4 S1   R 1+ 0 0 0 0   L 1+ 0 0 0 0      - Sensory:   Light touch: normal  Temperature: normal  Vibration: normal  - Cerebellum: No truncal ataxia.  No titubatio exception. Sections left blank in a completed note should be presumed not to have been done. Disclaimer: This record was dictated using  [address] [place].  There may be errors due to voice recognition problems that were not realized and corrected dur

## 2021-09-17 DIAGNOSIS — J45.20 MILD INTERMITTENT ASTHMA WITHOUT COMPLICATION: ICD-10-CM

## 2021-09-20 RX ORDER — MONTELUKAST SODIUM 10 MG/1
TABLET ORAL
Qty: 90 TABLET | Refills: 3 | Status: SHIPPED | OUTPATIENT
Start: 2021-09-20

## 2021-09-23 ENCOUNTER — LAB ENCOUNTER (OUTPATIENT)
Dept: LAB | Facility: HOSPITAL | Age: 71
End: 2021-09-23
Attending: FAMILY MEDICINE
Payer: MEDICARE

## 2021-09-23 ENCOUNTER — OFFICE VISIT (OUTPATIENT)
Dept: FAMILY MEDICINE CLINIC | Facility: CLINIC | Age: 71
End: 2021-09-23
Payer: COMMERCIAL

## 2021-09-23 ENCOUNTER — HOSPITAL ENCOUNTER (OUTPATIENT)
Dept: GENERAL RADIOLOGY | Facility: HOSPITAL | Age: 71
Discharge: HOME | End: 2021-09-23
Attending: FAMILY MEDICINE
Payer: MEDICARE

## 2021-09-23 VITALS
HEIGHT: 69 IN | SYSTOLIC BLOOD PRESSURE: 126 MMHG | BODY MASS INDEX: 39.25 KG/M2 | WEIGHT: 265 LBS | HEART RATE: 70 BPM | OXYGEN SATURATION: 96 % | TEMPERATURE: 98 F | DIASTOLIC BLOOD PRESSURE: 74 MMHG

## 2021-09-23 DIAGNOSIS — E66.01 MORBID OBESITY (HCC): ICD-10-CM

## 2021-09-23 DIAGNOSIS — M25.552 CHRONIC PAIN OF BOTH HIPS: ICD-10-CM

## 2021-09-23 DIAGNOSIS — M48.07 SPINAL STENOSIS, LUMBOSACRAL REGION: ICD-10-CM

## 2021-09-23 DIAGNOSIS — M25.551 CHRONIC PAIN OF BOTH HIPS: ICD-10-CM

## 2021-09-23 DIAGNOSIS — E11.9 TYPE 2 DIABETES MELLITUS WITHOUT COMPLICATION, WITHOUT LONG-TERM CURRENT USE OF INSULIN (HCC): ICD-10-CM

## 2021-09-23 DIAGNOSIS — Z00.00 HEALTHCARE MAINTENANCE: ICD-10-CM

## 2021-09-23 DIAGNOSIS — G89.29 CHRONIC PAIN OF BOTH HIPS: ICD-10-CM

## 2021-09-23 DIAGNOSIS — Z23 NEED FOR VACCINATION: ICD-10-CM

## 2021-09-23 DIAGNOSIS — Z23 NEED FOR INFLUENZA VACCINATION: ICD-10-CM

## 2021-09-23 DIAGNOSIS — M54.42 CHRONIC BILATERAL LOW BACK PAIN WITH BILATERAL SCIATICA: ICD-10-CM

## 2021-09-23 DIAGNOSIS — I10 BENIGN ESSENTIAL HTN: ICD-10-CM

## 2021-09-23 DIAGNOSIS — G89.29 CHRONIC BILATERAL LOW BACK PAIN WITH BILATERAL SCIATICA: ICD-10-CM

## 2021-09-23 DIAGNOSIS — I48.0 PAROXYSMAL ATRIAL FIBRILLATION (HCC): ICD-10-CM

## 2021-09-23 DIAGNOSIS — Z78.0 POSTMENOPAUSAL: Primary | ICD-10-CM

## 2021-09-23 DIAGNOSIS — M48.02 NEURAL FORAMINAL STENOSIS OF CERVICAL SPINE: ICD-10-CM

## 2021-09-23 DIAGNOSIS — G47.33 OSA (OBSTRUCTIVE SLEEP APNEA): ICD-10-CM

## 2021-09-23 DIAGNOSIS — M54.41 CHRONIC BILATERAL LOW BACK PAIN WITH BILATERAL SCIATICA: ICD-10-CM

## 2021-09-23 PROCEDURE — 85025 COMPLETE CBC W/AUTO DIFF WBC: CPT | Performed by: FAMILY MEDICINE

## 2021-09-23 PROCEDURE — 3074F SYST BP LT 130 MM HG: CPT | Performed by: FAMILY MEDICINE

## 2021-09-23 PROCEDURE — 90662 IIV NO PRSV INCREASED AG IM: CPT | Performed by: FAMILY MEDICINE

## 2021-09-23 PROCEDURE — 3008F BODY MASS INDEX DOCD: CPT | Performed by: FAMILY MEDICINE

## 2021-09-23 PROCEDURE — 83036 HEMOGLOBIN GLYCOSYLATED A1C: CPT | Performed by: FAMILY MEDICINE

## 2021-09-23 PROCEDURE — 84443 ASSAY THYROID STIM HORMONE: CPT | Performed by: FAMILY MEDICINE

## 2021-09-23 PROCEDURE — 80053 COMPREHEN METABOLIC PANEL: CPT | Performed by: FAMILY MEDICINE

## 2021-09-23 PROCEDURE — G0008 ADMIN INFLUENZA VIRUS VAC: HCPCS | Performed by: FAMILY MEDICINE

## 2021-09-23 PROCEDURE — 36415 COLL VENOUS BLD VENIPUNCTURE: CPT | Performed by: FAMILY MEDICINE

## 2021-09-23 PROCEDURE — 3078F DIAST BP <80 MM HG: CPT | Performed by: FAMILY MEDICINE

## 2021-09-23 PROCEDURE — 3044F HG A1C LEVEL LT 7.0%: CPT | Performed by: FAMILY MEDICINE

## 2021-09-23 PROCEDURE — 73522 X-RAY EXAM HIPS BI 3-4 VIEWS: CPT | Performed by: FAMILY MEDICINE

## 2021-09-23 PROCEDURE — 99205 OFFICE O/P NEW HI 60 MIN: CPT | Performed by: FAMILY MEDICINE

## 2021-09-23 RX ORDER — FLECAINIDE ACETATE 100 MG/1
100 TABLET ORAL DAILY
COMMUNITY

## 2021-09-23 RX ORDER — CETIRIZINE HYDROCHLORIDE 10 MG/1
10 TABLET ORAL DAILY
COMMUNITY
End: 2021-11-17

## 2021-09-23 NOTE — PROGRESS NOTES
HPI:   No chief complaint on file.       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  30 capsule 5   • apixaban 5 MG Oral Tab Take 1 tablet (5 mg total) by mouth 2 (two) times sissy 37.7      Smokeless tobacco: Never Used      Tobacco comment: pt states she never smoked a full pack always less then that     Vaping Use      Vaping Use: Never used    Alcohol use:  Yes      Alcohol/week: 2.0 standard drinks      Types: 2 Glasses of wine p 04/29/2022  Diabetes Care Nephropathy Screening due on 04/29/2022  Fall Risk Screening due on 04/30/2022  LDL Control due on 04/30/2022  Colonoscopy due on 07/13/2025  COVID-19 Vaccine Completed      Rubi Kirby MD

## 2021-09-23 NOTE — PROGRESS NOTES
HPI:   Patient presents with:  Pierremalgorzata Mao is a 66year old female presenting for:  DM  -for multiple years  -last A1c 4mo ago 6.2% at external facility  -never taken insulin  -has neuropathy    Chornic neck/back pain/llumbar radicu g/dL    HCT 36.7 35.0 - 48.0 %    MCV 97.9 80.0 - 100.0 fL    MCH 31.5 26.0 - 34.0 pg    MCHC 32.2 31.0 - 37.0 g/dL    RDW-SD 45.6 35.1 - 46.3 fL    RDW 12.9 11.0 - 15.0 %    .0 150.0 - 450.0 10(3)uL    Neutrophil Absolute Prelim 4.38 1.50 - 7.70 x1 Suppl (CONTOUR NEXT ONE) Does not apply Kit 1 kit by Does not apply route daily. 1 kit 0   • Fluticasone Furoate-Vilanterol (BREO ELLIPTA) 100-25 MCG/INH Inhalation Aerosol Powder, Breath Activated Inhale 1 puff into the lungs daily.      • pregabalin 100 M Procedure: COLONOSCOPY, POSSIBLE BIOPSY, POSSIBLE POLYPECTOMY 02760;  Surgeon: Ravinder Lyon MD;  Location: [address] [place] [address]   • KNEE REPLACEMENT SURGERY         Atorvastatin            ITCHING  Latex                   ITCHING   Social History: Conjunctiva/sclera: Conjunctivae normal.      Pupils: Pupils are equal, round, and reactive to light. Cardiovascular:      Rate and Rhythm: Normal rate and regular rhythm. Heart sounds: Normal heart sounds. No murmur heard.       Pulmonary:      Effo stenosis of cervical spine  Chronic bilateral low back pain with bilateral sciatica  -CPM, pain meds per specialist  -has upcoming appt with physiatry  -declined PT.  May need pain clinic     Paroxysmal atrial fibrillation (HCC)  -rate controlled  -CPM and FREE  XR DEXA BONE DENSITOMETRY (CPT=77080)    Health Maintenance:  Diabetes Care Dilated Eye Exam Never done  Annual Wellness Visit Never done  Mammogram Never done  Zoster Vaccines(2 of 3) due on 01/11/2013  DEXA Scan Never done  Diabetes Care A1C due on

## 2021-09-24 ENCOUNTER — LAB ENCOUNTER (OUTPATIENT)
Dept: LAB | Facility: REFERENCE LAB | Age: 71
End: 2021-09-24
Attending: FAMILY MEDICINE
Payer: MEDICARE

## 2021-09-24 DIAGNOSIS — Z00.00 ROUTINE GENERAL MEDICAL EXAMINATION AT A HEALTH CARE FACILITY: Primary | ICD-10-CM

## 2021-09-24 LAB
BILIRUB UR QL: NEGATIVE
CLARITY UR: CLEAR
COLOR UR: YELLOW
CREAT UR-SCNC: 135 MG/DL
GLUCOSE UR-MCNC: NEGATIVE MG/DL
HGB UR QL STRIP.AUTO: NEGATIVE
KETONES UR-MCNC: NEGATIVE MG/DL
LEUKOCYTE ESTERASE UR QL STRIP.AUTO: NEGATIVE
MICROALBUMIN UR-MCNC: 1.09 MG/DL
MICROALBUMIN/CREAT 24H UR-RTO: 8.1 UG/MG (ref ?–30)
NITRITE UR QL STRIP.AUTO: NEGATIVE
PH UR: 6 [PH] (ref 5–8)
PROT UR-MCNC: NEGATIVE MG/DL
SP GR UR STRIP: 1.02 (ref 1–1.03)
UROBILINOGEN UR STRIP-ACNC: 4

## 2021-09-24 PROCEDURE — 36415 COLL VENOUS BLD VENIPUNCTURE: CPT | Performed by: FAMILY MEDICINE

## 2021-09-24 PROCEDURE — 80061 LIPID PANEL: CPT | Performed by: FAMILY MEDICINE

## 2021-09-24 PROCEDURE — 82043 UR ALBUMIN QUANTITATIVE: CPT

## 2021-09-24 PROCEDURE — 81003 URINALYSIS AUTO W/O SCOPE: CPT

## 2021-09-24 PROCEDURE — 3061F NEG MICROALBUMINURIA REV: CPT | Performed by: FAMILY MEDICINE

## 2021-09-24 PROCEDURE — 82570 ASSAY OF URINE CREATININE: CPT

## 2021-09-27 ENCOUNTER — TELEPHONE (OUTPATIENT)
Dept: FAMILY MEDICINE CLINIC | Facility: CLINIC | Age: 71
End: 2021-09-27

## 2021-09-27 RX ORDER — MONTELUKAST SODIUM 10 MG/1
10 TABLET ORAL DAILY
Qty: 90 TABLET | Refills: 0 | Status: SHIPPED | OUTPATIENT
Start: 2021-09-27 | End: 2021-12-28

## 2021-09-27 NOTE — TELEPHONE ENCOUNTER
Pt called requesting refills for Montelukast Sodium 10 MG Oral Tab  Pt has ran out   Please call back pt when  This has been sent

## 2021-09-28 ENCOUNTER — TELEPHONE (OUTPATIENT)
Dept: NEUROLOGY | Facility: CLINIC | Age: 71
End: 2021-09-28

## 2021-09-28 ENCOUNTER — OFFICE VISIT (OUTPATIENT)
Dept: PHYSICAL MEDICINE AND REHAB | Facility: CLINIC | Age: 71
End: 2021-09-28
Payer: COMMERCIAL

## 2021-09-28 VITALS — WEIGHT: 265 LBS | BODY MASS INDEX: 39.25 KG/M2 | HEIGHT: 69 IN

## 2021-09-28 DIAGNOSIS — M54.41 CHRONIC BILATERAL LOW BACK PAIN WITH BILATERAL SCIATICA: ICD-10-CM

## 2021-09-28 DIAGNOSIS — M54.42 CHRONIC BILATERAL LOW BACK PAIN WITH BILATERAL SCIATICA: ICD-10-CM

## 2021-09-28 DIAGNOSIS — K21.9 GASTROESOPHAGEAL REFLUX DISEASE WITHOUT ESOPHAGITIS: ICD-10-CM

## 2021-09-28 DIAGNOSIS — I25.10 CORONARY ARTERIOSCLEROSIS: ICD-10-CM

## 2021-09-28 DIAGNOSIS — M47.812 ARTHROPATHY OF CERVICAL FACET JOINT: Primary | ICD-10-CM

## 2021-09-28 DIAGNOSIS — M50.30 DEGENERATIVE DISC DISEASE, CERVICAL: ICD-10-CM

## 2021-09-28 DIAGNOSIS — G89.29 CHRONIC BILATERAL LOW BACK PAIN WITH BILATERAL SCIATICA: ICD-10-CM

## 2021-09-28 PROCEDURE — 3008F BODY MASS INDEX DOCD: CPT | Performed by: PHYSICAL MEDICINE & REHABILITATION

## 2021-09-28 PROCEDURE — 99204 OFFICE O/P NEW MOD 45 MIN: CPT | Performed by: PHYSICAL MEDICINE & REHABILITATION

## 2021-09-28 NOTE — PROGRESS NOTES
[address]  NEW PATIENT EVALUATION    Consultation as a request of Dr. Marko Velasquez    Chief Complaint: Neck pain.     HISTORY OF PRESENT ILLNESS:   Patient presents with:  Neck Pain: Patient presents today c/o brenton COLONOSCOPY, POSSIBLE BIOPSY, POSSIBLE POLYPECTOMY 13720;  Surgeon: Pietro Prieto MD;  Location: [address] [place] [address]   • KNEE REPLACEMENT SURGERY           CURRENT MEDICATIONS:     Current Outpatient Medications   Medication Sig Dispense Refill   • aspirin 81 MG Oral Tab Take 81 mg by mouth daily.              ALLERGIES:     Atorvastatin            ITCHING  Latex                   ITCHING      FAMILY HISTORY:     Family History   Problem Relation Age of Onset   • Diabetes Mother           SOCIAL HISTO Normocephalic/ Atraumatic  Eyes: Extra-occular movements intact. Ears: No auricular hematoma or deformities  Mouth: No lesions or ulcerations  Heart: peripheral pulses intact. Normal capillary refill.    Lungs: Non-labored respirations  Abdomen: No abdomi 0.4 09/23/2021    TP 7.5 09/23/2021    ALB 3.6 09/23/2021    GLOBULIN 3.9 09/23/2021     09/23/2021    K 3.4 (L) 09/23/2021     09/23/2021    CO2 27.0 09/23/2021     No results found for: PTP, PT, INR  No results found for: VITD, QVITD, VITD25,

## 2021-09-29 ENCOUNTER — APPOINTMENT (OUTPATIENT)
Dept: ENDOCRINOLOGY | Facility: HOSPITAL | Age: 71
End: 2021-09-29
Attending: INTERNAL MEDICINE
Payer: MEDICARE

## 2021-10-04 ENCOUNTER — IMMUNIZATION (OUTPATIENT)
Dept: LAB | Facility: HOSPITAL | Age: 71
End: 2021-10-04
Attending: EMERGENCY MEDICINE
Payer: MEDICARE

## 2021-10-04 DIAGNOSIS — Z23 NEED FOR VACCINATION: Primary | ICD-10-CM

## 2021-10-04 PROCEDURE — 0003A SARSCOV2 VAC 30MCG/0.3ML IM: CPT

## 2021-10-05 ENCOUNTER — TELEPHONE (OUTPATIENT)
Dept: FAMILY MEDICINE CLINIC | Facility: CLINIC | Age: 71
End: 2021-10-05

## 2021-10-05 NOTE — TELEPHONE ENCOUNTER
----- Message from Demetrius Bear MD sent at 10/4/2021  5:39 PM CDT -----  Please let her know she has mild hip arthritis.  We can send her to [facility]) for possible injections if Dr. Cody Comes doesn't

## 2021-10-14 ENCOUNTER — TELEPHONE (OUTPATIENT)
Dept: PHYSICAL THERAPY | Facility: HOSPITAL | Age: 71
End: 2021-10-14

## 2021-10-18 ENCOUNTER — OFFICE VISIT (OUTPATIENT)
Dept: PHYSICAL THERAPY | Facility: HOSPITAL | Age: 71
End: 2021-10-18
Attending: Other
Payer: MEDICARE

## 2021-10-18 DIAGNOSIS — M48.02 NEURAL FORAMINAL STENOSIS OF CERVICAL SPINE: ICD-10-CM

## 2021-10-18 PROCEDURE — 97112 NEUROMUSCULAR REEDUCATION: CPT

## 2021-10-18 PROCEDURE — 97162 PT EVAL MOD COMPLEX 30 MIN: CPT

## 2021-10-18 NOTE — PROGRESS NOTES
SPINE EVALUATION:   Referring Physician: Dr. Yo Sharpe  Diagnosis:  Neural foraminal stenosis of cervical spine (M48.02)       Date of Service: 10/18/2021     PATIENT SUMMARY   Mykel Asencio is a 66year old female who presents to therapy today with compla pain.    Past medical history was reviewed with Sha Camacho.  Significant findings include:   Past Medical History:   Diagnosis Date   • Back problem    • Diabetes ([address]    • Essential hypertension    • High blood pressure    • High cholesterol    • Hyperlipidemi motion: limited closing on the lower R cervical facets as well as decreased opening; hypomobility at CT junction  Palpation: tenderness B UT, min tenderness suboccipitals    Strength: (* denotes performed with pain)  Not formally assessed this date, jeff functional tasks and ADL's. Pt will display improved score on the outcome measure to 75% to demonstrate increased functional ability. Pt will be able to lift light-mod weights/objects with proper mechanics and min to no pain.        Frequency / Duration

## 2021-10-20 NOTE — PROGRESS NOTES
3/16/2021-presents ambulatory to [facility] to establish care;  NEW CONSULT C/O LBP INTO THE BILATERAL HIPS & GROINS, RADIATING DOWN BLE;  Pt reports many years  fo this pain;   As a young girl she use to play tennis-jumping, running and twisting; states when she
n/a

## 2021-10-21 ENCOUNTER — APPOINTMENT (OUTPATIENT)
Dept: PHYSICAL THERAPY | Facility: HOSPITAL | Age: 71
End: 2021-10-21
Attending: Other
Payer: MEDICARE

## 2021-10-22 ENCOUNTER — TELEPHONE (OUTPATIENT)
Dept: FAMILY MEDICINE CLINIC | Facility: CLINIC | Age: 71
End: 2021-10-22

## 2021-10-22 NOTE — TELEPHONE ENCOUNTER
Called patient, SARAHY verified, results below given, per patient she use to take Lipitor but states she had stop it due to side effects she states she does not recall what the side affects were I informed her I would relay message to Dr Inocencia Hernandez and call her back next week

## 2021-10-22 NOTE — TELEPHONE ENCOUNTER
----- Message from Mariia Branch MD sent at 10/12/2021  3:48 PM CDT -----  Please let her know  1) diabetes at goal. Continue same regimen. 2)  cholesterol elevated. Is she taking a statin aside from her fenofibrate  3) her kidney function is returning to her baseline  4) K+ mildly low, increase in diet  5) thyroid stable  6) minimal anemia. Increase iron in diet    Does she need any refills on her medications? In our visit she had enough medication from her previous PCP.   (If she asks for refills of controlled pain medication, I told her at the visit that it would be the physiatry specialist)

## 2021-10-26 ENCOUNTER — OFFICE VISIT (OUTPATIENT)
Dept: PHYSICAL THERAPY | Facility: HOSPITAL | Age: 71
End: 2021-10-26
Attending: Other
Payer: MEDICARE

## 2021-10-26 PROCEDURE — 97112 NEUROMUSCULAR REEDUCATION: CPT

## 2021-10-26 PROCEDURE — 97140 MANUAL THERAPY 1/> REGIONS: CPT

## 2021-10-26 PROCEDURE — 97110 THERAPEUTIC EXERCISES: CPT

## 2021-10-26 NOTE — PROGRESS NOTES
Dx:  Neural foraminal stenosis of cervical spine (M48.02)           Insurance (Authorized # of Visits):   [employer] (10; POC exp 1/16/2022)           Authorizing Physician: Dr. Gely Briones  Next MD visit: none scheduled  Fall Risk: standard         Prec increased scap stabilization and postural awareness during functional tasks. Pt will be independent with HEP to assist with pain management and continue to improve function. Pt will demonstrate decreased pain to <3/10 during functional tasks and ADL's.

## 2021-10-27 NOTE — TELEPHONE ENCOUNTER
She has that she had itching with atorvastatin in the allergies tab. Is she willing to try a low dose rosuvastatin along with her fenofibrate?

## 2021-10-28 ENCOUNTER — APPOINTMENT (OUTPATIENT)
Dept: PHYSICAL THERAPY | Facility: HOSPITAL | Age: 71
End: 2021-10-28
Attending: Other
Payer: MEDICARE

## 2021-10-28 ENCOUNTER — TELEPHONE (OUTPATIENT)
Dept: PHYSICAL THERAPY | Facility: HOSPITAL | Age: 71
End: 2021-10-28

## 2021-10-28 NOTE — TELEPHONE ENCOUNTER
Called pt due to no show this date. Pt just woke up and forgot about therapy appt today. She had some soreness in the shoulder after last session and had to use heating pad. Did her home exercises this morning and it was fine. Shoulder is doing better.  Pt

## 2021-10-29 RX ORDER — ROSUVASTATIN CALCIUM 10 MG/1
TABLET, COATED ORAL
Qty: 90 TABLET | Refills: 0 | OUTPATIENT
Start: 2021-10-29

## 2021-10-29 RX ORDER — ROSUVASTATIN CALCIUM 10 MG/1
10 TABLET, COATED ORAL NIGHTLY
Qty: 30 TABLET | Refills: 0 | Status: SHIPPED | OUTPATIENT
Start: 2021-10-29 | End: 2021-12-07

## 2021-10-29 RX ORDER — FENOFIBRATE 200 MG/1
200 CAPSULE ORAL NIGHTLY
Qty: 90 CAPSULE | Refills: 0 | Status: SHIPPED | OUTPATIENT
Start: 2021-10-29 | End: 2022-05-25

## 2021-10-29 NOTE — TELEPHONE ENCOUNTER
Patient is ok with taking low dose Rosuvastatin, 10mg tab sent for quantity of 30 to see if patient can tolerate, Fenofibrate sent per patient previous doctor had her on Fenofibrate micronized 200cap

## 2021-10-31 PROBLEM — I10 BENIGN ESSENTIAL HTN: Status: ACTIVE | Noted: 2021-10-31

## 2021-11-02 ENCOUNTER — OFFICE VISIT (OUTPATIENT)
Dept: PHYSICAL THERAPY | Facility: HOSPITAL | Age: 71
End: 2021-11-02
Attending: Other
Payer: MEDICARE

## 2021-11-02 PROCEDURE — 97112 NEUROMUSCULAR REEDUCATION: CPT

## 2021-11-02 PROCEDURE — 97140 MANUAL THERAPY 1/> REGIONS: CPT

## 2021-11-02 PROCEDURE — 97110 THERAPEUTIC EXERCISES: CPT

## 2021-11-02 NOTE — PROGRESS NOTES
Dx:  Neural foraminal stenosis of cervical spine (M48.02)           Insurance (Authorized # of Visits):   [employer] (10; POC exp 1/16/2022)           Authorizing Physician: Dr. Georges Del Cid  Next MD visit: none scheduled  Fall Risk: standard         Prec other exercises, however no increased pain. Goals:   Pt will demonstrate improved ROM of cervical spine to WNL to assist with ADL's and other daily tasks. Pt will display increased scap stabilization and postural awareness during functional tasks.

## 2021-11-04 ENCOUNTER — OFFICE VISIT (OUTPATIENT)
Dept: PHYSICAL THERAPY | Facility: HOSPITAL | Age: 71
End: 2021-11-04
Attending: Other
Payer: MEDICARE

## 2021-11-04 PROCEDURE — 97112 NEUROMUSCULAR REEDUCATION: CPT

## 2021-11-04 PROCEDURE — 97110 THERAPEUTIC EXERCISES: CPT

## 2021-11-04 PROCEDURE — 97140 MANUAL THERAPY 1/> REGIONS: CPT

## 2021-11-04 NOTE — PROGRESS NOTES
Dx:  Neural foraminal stenosis of cervical spine (M48.02)           Insurance (Authorized # of Visits):   [employer] (10; POC exp 1/16/2022)           Authorizing Physician: Dr. Ed Avendaño  Next MD visit: none scheduled  Fall Risk: standard         Prec Goals:   Pt will demonstrate improved ROM of cervical spine to WNL to assist with ADL's and other daily tasks. Pt will display increased scap stabilization and postural awareness during functional tasks.    Pt will be independent with HEP to assist 2x15  Serratus punches supine, 2# bar - 2x10  Chin tucks supine - 10x5\" Neuro Behzad:  Mid rows with RTB in sitting - 2x15  Serratus punches supine, 2# bar - 2x10  Chin tucks supine - 10x5\"  Chin tucks with rotation to ~45 degrees - 10 B   Chin tucks with

## 2021-11-08 ENCOUNTER — TELEPHONE (OUTPATIENT)
Dept: FAMILY MEDICINE CLINIC | Facility: CLINIC | Age: 71
End: 2021-11-08

## 2021-11-08 DIAGNOSIS — M25.559 HIP PAIN: Primary | ICD-10-CM

## 2021-11-08 NOTE — TELEPHONE ENCOUNTER
Pt called requesting an order for her to receive therapy for her hip  To please let her know when order has been placed

## 2021-11-09 ENCOUNTER — OFFICE VISIT (OUTPATIENT)
Dept: PHYSICAL THERAPY | Facility: HOSPITAL | Age: 71
End: 2021-11-09
Attending: Other
Payer: MEDICARE

## 2021-11-09 PROCEDURE — 97112 NEUROMUSCULAR REEDUCATION: CPT

## 2021-11-09 PROCEDURE — 97140 MANUAL THERAPY 1/> REGIONS: CPT

## 2021-11-09 NOTE — PROGRESS NOTES
Dx:  Neural foraminal stenosis of cervical spine (M48.02)           Insurance (Authorized # of Visits):   [employer] (10; POC exp 1/16/2022)           Authorizing Physician: Dr. Shant Wilson  Next MD visit: none scheduled  Fall Risk: standard         Prec demonstrate improved ROM of cervical spine to WNL to assist with ADL's and other daily tasks. Pt will display increased scap stabilization and postural awareness during functional tasks.    Pt will be independent with HEP to assist with pain management an RTB in sitting - 2x15  Serratus punches supine - 15  Chin tucks supine - 10x5\" Neuro Behzad:  Scap squeezes seated - 10x5\"  Mid rows with RTB in sitting - 2x15  Serratus punches supine, 2# bar - 2x10  Chin tucks supine - 10x5\" Neuro Behzad:  Mid rows with R

## 2021-11-09 NOTE — TELEPHONE ENCOUNTER
LVM informing patient that PT orders have been placed in chart. To call [facility] PT to schedule. Number provided. Dr. Hutson Crews in agreement with plan to send to PT.       Per LOV with PCP 9/23/21:    Chornic neck/back pain/llumbar radiculiopathy/cervical ne

## 2021-11-10 ENCOUNTER — TELEPHONE (OUTPATIENT)
Dept: PHYSICAL THERAPY | Facility: HOSPITAL | Age: 71
End: 2021-11-10

## 2021-11-11 ENCOUNTER — APPOINTMENT (OUTPATIENT)
Dept: PHYSICAL THERAPY | Facility: HOSPITAL | Age: 71
End: 2021-11-11
Attending: Other
Payer: MEDICARE

## 2021-11-12 ENCOUNTER — HOSPITAL ENCOUNTER (OUTPATIENT)
Dept: MRI IMAGING | Facility: HOSPITAL | Age: 71
Discharge: HOME | End: 2021-11-12
Attending: NEUROLOGICAL SURGERY
Payer: MEDICARE

## 2021-11-12 DIAGNOSIS — M47.22 OTHER SPONDYLOSIS WITH RADICULOPATHY, CERVICAL REGION: ICD-10-CM

## 2021-11-12 PROCEDURE — 72148 MRI LUMBAR SPINE W/O DYE: CPT | Performed by: NEUROLOGICAL SURGERY

## 2021-11-17 ENCOUNTER — OFFICE VISIT (OUTPATIENT)
Dept: PAIN CLINIC | Facility: HOSPITAL | Age: 71
End: 2021-11-17
Attending: STUDENT IN AN ORGANIZED HEALTH CARE EDUCATION/TRAINING PROGRAM
Payer: MEDICARE

## 2021-11-17 VITALS
WEIGHT: 285 LBS | RESPIRATION RATE: 18 BRPM | SYSTOLIC BLOOD PRESSURE: 138 MMHG | HEART RATE: 75 BPM | BODY MASS INDEX: 42.21 KG/M2 | DIASTOLIC BLOOD PRESSURE: 80 MMHG | HEIGHT: 69 IN

## 2021-11-17 DIAGNOSIS — M47.819 FACET ARTHROPATHY: Primary | ICD-10-CM

## 2021-11-17 PROCEDURE — 99211 OFF/OP EST MAY X REQ PHY/QHP: CPT

## 2021-11-17 RX ORDER — ACETAMINOPHEN AND CODEINE PHOSPHATE 60; 300 MG/1; MG/1
1 TABLET ORAL EVERY 8 HOURS PRN
COMMUNITY

## 2021-11-17 NOTE — PROGRESS NOTES
11/17/2021-pt returns ambulatory today f/u LBP INTO RT HIP & DOWN RLE;  Pt last seen 7/22/2021; her last injection 8/13/2021-AMANDA. FACET /C MAC-DR. BELAVIC;  Pt reports 3 weeks of relief and the pain returned again;   Is interested in preceding with a RFA

## 2021-11-18 NOTE — CHRONIC PAIN
[facility]  Pain Consultation     HISTORY OF PRESENT ILLNESS:  Jose Juan Crum is a 66year old female with chronic axial LBP (L>R) who is s./p facet injections which offered relief x 3 weeks.  Patient states her hip bothers her as well but do • Ascorbic Acid (VITAMIN C) 1000 MG Oral Tab Take 1,000 mg by mouth daily. • Albuterol Sulfate 108 (90 Base) MCG/ACT Inhalation Aerosol Powder, Breath Activated Inhale 2 puffs into the lungs as needed.    (Patient not taking: Reported on 11/17/2021 cervical     Benign essential HTN    Past Medical History:   Diagnosis Date   • Back problem    • Diabetes ([facility] [address]    • Essential hypertension    • High blood pressure    • High cholesterol    • Hyperlipidemia    • Obstructive apnea     on CPAP Elizabet CANTU Needs: Not on file  Physical Activity: Not on file  Stress: Not on file  Social Connections: Not on file  Intimate Partner Violence: Not on file  Housing Stability: Not on file  PHYSICAL EXAMINATION:   11/17/21  0954   BP: 138/80   Pulse: 75   Resp: 18   W facet injections. Discussed w/ patient may require MBB prior to ablation pending insurance approval. Pt understands and is open to MBB prior to ablation if necessary. Pt will have to hold eliquis and aspirin for procedure.      Comprehensive analgesic plan

## 2021-11-23 ENCOUNTER — TELEPHONE (OUTPATIENT)
Dept: PAIN CLINIC | Facility: HOSPITAL | Age: 71
End: 2021-11-23

## 2021-11-23 ENCOUNTER — TELEPHONE (OUTPATIENT)
Dept: OBGYN CLINIC | Facility: CLINIC | Age: 71
End: 2021-11-23

## 2021-11-23 NOTE — TELEPHONE ENCOUNTER
Pt is calling  Has question on medication EFcitalopram 10 mg  Pt said its for depression she is not depressed asking what the medication is for ,

## 2021-11-26 NOTE — TELEPHONE ENCOUNTER
Spoke with pt and advised TM prescribed escitalopram for her hot flashes. Pt agreed and voiced understanding.

## 2021-12-03 ENCOUNTER — HOSPITAL ENCOUNTER (OUTPATIENT)
Dept: BONE DENSITY | Facility: HOSPITAL | Age: 71
Discharge: HOME | End: 2021-12-03
Attending: FAMILY MEDICINE
Payer: MEDICARE

## 2021-12-03 ENCOUNTER — TELEPHONE (OUTPATIENT)
Dept: FAMILY MEDICINE CLINIC | Facility: CLINIC | Age: 71
End: 2021-12-03

## 2021-12-03 DIAGNOSIS — Z78.0 POSTMENOPAUSAL: ICD-10-CM

## 2021-12-03 PROCEDURE — 77080 DXA BONE DENSITY AXIAL: CPT | Performed by: FAMILY MEDICINE

## 2021-12-07 RX ORDER — ROSUVASTATIN CALCIUM 10 MG/1
TABLET, COATED ORAL
Qty: 90 TABLET | Refills: 0 | Status: SHIPPED | OUTPATIENT
Start: 2021-12-07

## 2021-12-07 RX ORDER — ROSUVASTATIN CALCIUM 10 MG/1
TABLET, COATED ORAL
Qty: 30 TABLET | Refills: 0 | Status: SHIPPED | OUTPATIENT
Start: 2021-12-07 | End: 2021-12-07

## 2021-12-09 DIAGNOSIS — G62.9 POLYNEUROPATHY: ICD-10-CM

## 2021-12-09 DIAGNOSIS — R20.2 FORMICATION: ICD-10-CM

## 2021-12-09 NOTE — TELEPHONE ENCOUNTER
Pt called in regards previous messages  Pt is out of medication for the past 3 days and needs this today   To please inform her when this has been sent

## 2021-12-10 RX ORDER — PREGABALIN 100 MG/1
100 CAPSULE ORAL 3 TIMES DAILY
Qty: 270 CAPSULE | Refills: 0 | OUTPATIENT
Start: 2021-12-10

## 2021-12-10 NOTE — TELEPHONE ENCOUNTER
Spoke to pharmacy. Rosuvastatin is ready at the pharmacy. Patient notified. She is also inquiring about pregabalin. Informed her that this is a medication from Dr. Ed Avendaño and she should request it from Dr. Anushka Wilson office.   Rosuvastatin from our end i

## 2021-12-10 NOTE — TELEPHONE ENCOUNTER
Spoke to patient. She is taking lyrica 100 mg, 2 po BID (400 mg total). She states that she is out of refills because her last prescription was for TID. Explained that insurance probably will not cover 4 caps of 100 mg per day.   She is agreeable to 200 m

## 2021-12-10 NOTE — TELEPHONE ENCOUNTER
Patient states she is out of her medication b/c Dr. Oren Ibarra increased the dosage. Her pharmacy said she should not be out but the increase is the reason why.

## 2021-12-12 RX ORDER — PREGABALIN 200 MG/1
200 CAPSULE ORAL 2 TIMES DAILY
Qty: 60 CAPSULE | Refills: 0 | Status: SHIPPED | OUTPATIENT
Start: 2021-12-12 | End: 2021-12-15

## 2021-12-13 ENCOUNTER — TELEPHONE (OUTPATIENT)
Dept: NEUROLOGY | Facility: CLINIC | Age: 71
End: 2021-12-13

## 2021-12-13 NOTE — TELEPHONE ENCOUNTER
Pt called office stating she has been out of Pregabalin (Lyrica) for 2 weeks. She is having a hard time getting it filled at the pharmacy. Pt states there was a dose change. Per Epic review, prescription with correct dosage e-scribd on 12/12/21.  Attempt

## 2021-12-15 ENCOUNTER — OFFICE VISIT (OUTPATIENT)
Dept: NEUROLOGY | Facility: CLINIC | Age: 71
End: 2021-12-15
Payer: COMMERCIAL

## 2021-12-15 VITALS
DIASTOLIC BLOOD PRESSURE: 82 MMHG | SYSTOLIC BLOOD PRESSURE: 136 MMHG | BODY MASS INDEX: 42 KG/M2 | HEART RATE: 70 BPM | HEIGHT: 69 IN

## 2021-12-15 DIAGNOSIS — G62.9 POLYNEUROPATHY: ICD-10-CM

## 2021-12-15 DIAGNOSIS — M48.02 NEURAL FORAMINAL STENOSIS OF CERVICAL SPINE: Primary | ICD-10-CM

## 2021-12-15 PROCEDURE — 3079F DIAST BP 80-89 MM HG: CPT | Performed by: OTHER

## 2021-12-15 PROCEDURE — 99214 OFFICE O/P EST MOD 30 MIN: CPT | Performed by: OTHER

## 2021-12-15 PROCEDURE — 3075F SYST BP GE 130 - 139MM HG: CPT | Performed by: OTHER

## 2021-12-15 RX ORDER — PREGABALIN 225 MG/1
225 CAPSULE ORAL 2 TIMES DAILY
Qty: 60 CAPSULE | Refills: 3 | Status: SHIPPED | OUTPATIENT
Start: 2021-12-15 | End: 2022-04-14

## 2021-12-15 NOTE — PROGRESS NOTES
[facility]  [address]  [phone]        Neurology Clinic Follow Up Note    Chief Complaint:  Neurologic Problem (LOV 9/9/21. Pt presents today for f/u.  Pt she was without Lyrica for 2 weeks r History/Subjective :   Reyes Madden was her birthday  She bought a stationary bike, is worried she cannot lift her feet up high enough to get on and get off the bike.   No falls  Reports she is dropping objects  Tried to put a cup on the counter and it fell of hours for a total of 2 doses.    • Fluticasone Furoate (ARNUITY ELLIPTA) 100 MCG/ACT Inhalation Aerosol Powder, Breath Activated 1 puff, Inhalation, Daily   • Fluticasone Furoate-Vilanterol (BREO ELLIPTA) 100-25 MCG/INH Inhalation Aerosol Powder, Breath Act Shoulder shrug symmetric.  - Motor:  normal tone/bulk. No interosseous wasting. No flattening of hypothenar eminences.      Right Left     Motor Strength   Deltoids 4+ 4+  Triceps 4+ 4+  Biceps 4+ 4+  Wrist Extensors   5 4  (fatigues)  Hip Flexors 5 4 lumbar region without neurogenic claudication  Patient is going to follow-up with physical medicine rehab                 Education Provided  Education/Instructions given to: patient   Barriers to Learning: None  Content: Refer to note above   Evaluation/O

## 2021-12-17 ENCOUNTER — OFFICE VISIT (OUTPATIENT)
Dept: PAIN CLINIC | Facility: HOSPITAL | Age: 71
End: 2021-12-17
Attending: STUDENT IN AN ORGANIZED HEALTH CARE EDUCATION/TRAINING PROGRAM
Payer: MEDICARE

## 2021-12-17 DIAGNOSIS — M47.816 LUMBAR FACET ARTHROPATHY: Primary | Chronic | ICD-10-CM

## 2021-12-17 PROCEDURE — 99211 OFF/OP EST MAY X REQ PHY/QHP: CPT

## 2021-12-17 NOTE — PROGRESS NOTES
12/17/2021-pt returns ambulatory today f/u LBP INTO RT HIP & DOWN RLE;  her last injection 8/13/2021-BILAT. FACET /C MAC-DR. ONOFRE;  Pt reports 3 weeks of relief and the pain returned again; Seen by Dr. Srinivas Padilla in November; today would like to discuss

## 2021-12-17 NOTE — CHRONIC PAIN
Follow-up Note    Jessica Arambula is a 66year old old female, originally referred to the pain clinic by  No ref. provider found, with history of Lumbar facet arthropathy  (primary encounter diagnosis) returns to the clinic for follow up.  Patient is compl Oral Tab Take 1,000 mg by mouth 2 (two) times daily. • Calcium Carbonate-Vitamin D 600-125 MG-UNIT Oral Tab Take by mouth daily. (Patient not taking: Reported on 12/15/2021)     • Ascorbic Acid (VITAMIN C) 1000 MG Oral Tab Take 1,000 mg by mouth daily. POSSIBLE BIOPSY, POSSIBLE POLYPECTOMY 73713;  Surgeon: Rolf Leong MD;  Location: [address] [place] [address]   • KNEE REPLACEMENT SURGERY         FAMILY HISTORY:  Family History   Problem Relation Age of Onset   • Diabetes Mother        SOCIAL HISTORY: file  Housing Stability: Not on file  ADVANCE CARE PLANNING:    Advance Care Plan NOT discussed. PHYSICAL EXAMINATION:  There were no vitals filed for this visit.    General: Alert and oriented x3, NAD, appears stated age, appropriate disposition and demea and answer session. Patient agreeable to discussion plan.  Greater than 50% of the time was spent with counseling (nature of discussion centered around pain, therapy, and treatment options), face to face time, time spent reviewing data, obtaining patient in

## 2021-12-21 ENCOUNTER — DOCUMENTATION ONLY (OUTPATIENT)
Dept: PAIN CLINIC | Facility: HOSPITAL | Age: 71
End: 2021-12-21

## 2021-12-21 NOTE — PROGRESS NOTES
Procedure code [address] needed via [facility] web portal   All clinicals submitted  Case has been approved  auth # [id number]

## 2021-12-28 RX ORDER — MONTELUKAST SODIUM 10 MG/1
TABLET ORAL
Qty: 90 TABLET | Refills: 0 | Status: SHIPPED | OUTPATIENT
Start: 2021-12-28

## 2021-12-30 ENCOUNTER — TELEPHONE (OUTPATIENT)
Dept: PHYSICAL THERAPY | Facility: HOSPITAL | Age: 71
End: 2021-12-30

## 2021-12-30 ENCOUNTER — TELEPHONE (OUTPATIENT)
Dept: FAMILY MEDICINE CLINIC | Facility: CLINIC | Age: 71
End: 2021-12-30

## 2021-12-30 NOTE — TELEPHONE ENCOUNTER
Patient is calling stating she has not gotten this medication refilled by Dr. Ralph Wilson due to her having some pills from her previous provider. She Is now out and is asking for a refill on her Acetaminophen-Codeine #4 300-60 MG Oral Tab.        Did le

## 2022-01-03 ENCOUNTER — APPOINTMENT (OUTPATIENT)
Dept: PHYSICAL THERAPY | Facility: HOSPITAL | Age: 72
End: 2022-01-03
Attending: FAMILY MEDICINE
Payer: MEDICARE

## 2022-01-03 ENCOUNTER — TELEPHONE (OUTPATIENT)
Dept: PHYSICAL THERAPY | Facility: HOSPITAL | Age: 72
End: 2022-01-03

## 2022-01-03 NOTE — TELEPHONE ENCOUNTER
At my 1st and only appt with her I explained that controlled substances are per the specialist. She already saw the pain clinic and physiatrist. She needs to request from them as we will not provided pain medications long term

## 2022-01-03 NOTE — TELEPHONE ENCOUNTER
patient is advised to talk to pain management about her prescription for tylenol #4 and she is aware she is to take Rosuvastatin and not atorvastatin.

## 2022-01-03 NOTE — TELEPHONE ENCOUNTER
Spoke with pt on the phone due to no show this date. Pt attempted to call because she didn't know where to go once she got to the hospital. Called the wrong number.  Then tried the main number and they put her on hold and it was just telling her about what

## 2022-01-03 NOTE — TELEPHONE ENCOUNTER
Pt is calling regarding medications   Acetaminophen-Codeine #4 300-60 MG Oral Tab  atorvastatin 40 MG Oral Tab.         Please advise

## 2022-01-05 ENCOUNTER — OFFICE VISIT (OUTPATIENT)
Dept: PHYSICAL THERAPY | Facility: HOSPITAL | Age: 72
End: 2022-01-05
Attending: FAMILY MEDICINE
Payer: MEDICARE

## 2022-01-05 DIAGNOSIS — M25.559 HIP PAIN: ICD-10-CM

## 2022-01-05 PROCEDURE — 97162 PT EVAL MOD COMPLEX 30 MIN: CPT

## 2022-01-05 PROCEDURE — 97110 THERAPEUTIC EXERCISES: CPT

## 2022-01-05 NOTE — PROGRESS NOTES
LOWER EXTREMITY EVALUATION:   Referring Physician: Dr. Ish Bauer  Diagnosis: Hip pain (M25.559)     Date of Service: 1/5/2022     PATIENT SUMMARY   Alicja Shaikh is a 66year old female who presents to therapy today with complaints of B hip pain without hip pain. Pt goals include decrease the hip pain and the pain in her waist, be able to walk and get around with less pain. Past medical history was reviewed with Megan Brown.  Significant findings include:  Past Medical History:   Diagnosis Date   • B tightness    Strength/MMT: (* denotes performed with pain)  Hip Knee Foot/Ankle   Flexion: R 3+/5; L 4-/5  Extension: R 3+/5; L 3+/5  Abduction: R 3+/5; L 4-/5  ER: R 3+/5; L 4-/5  IR: R 3+/5; L 4-/5 Flexion: R 4/5; L 4+/5  Extension: R 4+/5; L 4+/5    DF: improved ROM of R hip to [facility] to assist with ADL's and other daily tasks. Pt will display increased core stabilization and postural awareness during functional tasks.    Pt will be independent with HEP to assist with pain management and continue to improve

## 2022-01-10 ENCOUNTER — OFFICE VISIT (OUTPATIENT)
Dept: PHYSICAL THERAPY | Facility: HOSPITAL | Age: 72
End: 2022-01-10
Attending: FAMILY MEDICINE
Payer: MEDICARE

## 2022-01-10 PROCEDURE — 97112 NEUROMUSCULAR REEDUCATION: CPT

## 2022-01-10 PROCEDURE — 97140 MANUAL THERAPY 1/> REGIONS: CPT

## 2022-01-10 NOTE — PROGRESS NOTES
Dx: Hip pain (M25.559)             Insurance (Authorized # of Visits):   [employer] (10 per POC)            Authorizing Physician: Dr. Pedro Villalobos  Next MD visit: none scheduled  Fall Risk: standard         Precautions: DM, HTN, Afib distraction and STM. Pt has difficulty moving into hip flex/knee flex for hooklying position. Performed 1.5 exercises lying down however then had to sit to finish exercises. Much fatigue at end of session and pt was ready to stop.  Pt was 18 mins late to th

## 2022-01-12 ENCOUNTER — OFFICE VISIT (OUTPATIENT)
Dept: PHYSICAL THERAPY | Facility: HOSPITAL | Age: 72
End: 2022-01-12
Attending: FAMILY MEDICINE
Payer: MEDICARE

## 2022-01-12 PROCEDURE — 97112 NEUROMUSCULAR REEDUCATION: CPT

## 2022-01-12 PROCEDURE — 97140 MANUAL THERAPY 1/> REGIONS: CPT

## 2022-01-13 ENCOUNTER — TELEPHONE (OUTPATIENT)
Dept: FAMILY MEDICINE CLINIC | Facility: CLINIC | Age: 72
End: 2022-01-13

## 2022-01-13 NOTE — TELEPHONE ENCOUNTER
Pt is calling in regards to medications  Diltiazem HCI   Pt is stating that this medication was prescribe by another doctor. Pt is asking if doctor Myla Maharaj is able to send a refill.           Please call and advise

## 2022-01-14 RX ORDER — DILTIAZEM HYDROCHLORIDE 300 MG/1
300 CAPSULE, COATED, EXTENDED RELEASE ORAL DAILY
Qty: 30 CAPSULE | Refills: 2 | Status: SHIPPED | OUTPATIENT
Start: 2022-01-14

## 2022-01-17 ENCOUNTER — OFFICE VISIT (OUTPATIENT)
Dept: PHYSICAL THERAPY | Facility: HOSPITAL | Age: 72
End: 2022-01-17
Attending: FAMILY MEDICINE
Payer: MEDICARE

## 2022-01-17 PROCEDURE — 97112 NEUROMUSCULAR REEDUCATION: CPT

## 2022-01-17 PROCEDURE — 97140 MANUAL THERAPY 1/> REGIONS: CPT

## 2022-01-17 NOTE — PROGRESS NOTES
Dx: Hip pain (M25.559)             Insurance (Authorized # of Visits):   [employer] (10 per POC)            Authorizing Physician: Dr. Jolanta Matta MD visit: none scheduled  Fall Risk: standard         Precautions: DM, HTN, Afib given handout with instructions as well. Goals:   Pt will demonstrate improved ROM of R hip to [facility] to assist with ADL's and other daily tasks. Pt will display increased core stabilization and postural awareness during functional tasks.    Pt will be i with TA brace - 2x6-10, 5\" holds B   Seated lumbar flex with ball and TA brace with ext back to neutral - 5x5\" holds   Discussed activation of TA brace during STS as well as other functional tasks - pt practiced twice at end of session       TherEx:  Carisa

## 2022-01-19 ENCOUNTER — TELEPHONE (OUTPATIENT)
Dept: PHYSICAL THERAPY | Facility: HOSPITAL | Age: 72
End: 2022-01-19

## 2022-01-19 ENCOUNTER — APPOINTMENT (OUTPATIENT)
Dept: PHYSICAL THERAPY | Facility: HOSPITAL | Age: 72
End: 2022-01-19
Attending: FAMILY MEDICINE
Payer: MEDICARE

## 2022-01-19 NOTE — TELEPHONE ENCOUNTER
Called pt due to no show for appt this date. Pt states that for some reason her phone won't call our office number.  She talked to jessy and they said it had something to do with the hospital. She called central scheduling this morning and told them that

## 2022-01-24 ENCOUNTER — APPOINTMENT (OUTPATIENT)
Dept: PHYSICAL THERAPY | Facility: HOSPITAL | Age: 72
End: 2022-01-24
Attending: FAMILY MEDICINE
Payer: MEDICARE

## 2022-01-24 ENCOUNTER — TELEPHONE (OUTPATIENT)
Dept: PHYSICAL THERAPY | Facility: HOSPITAL | Age: 72
End: 2022-01-24

## 2022-01-24 NOTE — TELEPHONE ENCOUNTER
Pt CASEY stating that she needed to cancel 10:30am appt today due to the weather. She can't get her car out of her garage.

## 2022-01-26 ENCOUNTER — APPOINTMENT (OUTPATIENT)
Dept: PHYSICAL THERAPY | Facility: HOSPITAL | Age: 72
End: 2022-01-26
Attending: FAMILY MEDICINE
Payer: MEDICARE

## 2022-01-26 ENCOUNTER — TELEPHONE (OUTPATIENT)
Dept: PHYSICAL THERAPY | Facility: HOSPITAL | Age: 72
End: 2022-01-26

## 2022-01-26 NOTE — TELEPHONE ENCOUNTER
Pt called and LM stating that she had to cancel appt today. Did not state reason why. Called pt and asked if she would like to schedule appt for tomorrow or Monday. Pt stated that she didn't want to do this week because the weather is too cold.  Said th

## 2022-01-28 ENCOUNTER — OFFICE VISIT (OUTPATIENT)
Dept: PAIN CLINIC | Facility: HOSPITAL | Age: 72
End: 2022-01-28
Attending: NURSE PRACTITIONER
Payer: MEDICARE

## 2022-01-28 VITALS — DIASTOLIC BLOOD PRESSURE: 80 MMHG | HEART RATE: 67 BPM | SYSTOLIC BLOOD PRESSURE: 140 MMHG | OXYGEN SATURATION: 97 %

## 2022-01-28 DIAGNOSIS — M47.819 FACET ARTHROPATHY: ICD-10-CM

## 2022-01-28 DIAGNOSIS — M54.41 CHRONIC BILATERAL LOW BACK PAIN WITH BILATERAL SCIATICA: ICD-10-CM

## 2022-01-28 DIAGNOSIS — G89.29 CHRONIC BILATERAL LOW BACK PAIN WITH BILATERAL SCIATICA: ICD-10-CM

## 2022-01-28 DIAGNOSIS — M48.061 SPINAL STENOSIS OF LUMBAR REGION WITHOUT NEUROGENIC CLAUDICATION: ICD-10-CM

## 2022-01-28 DIAGNOSIS — M54.42 CHRONIC BILATERAL LOW BACK PAIN WITH BILATERAL SCIATICA: ICD-10-CM

## 2022-01-28 DIAGNOSIS — M47.816 LUMBAR FACET ARTHROPATHY: Primary | ICD-10-CM

## 2022-01-28 PROCEDURE — 99211 OFF/OP EST MAY X REQ PHY/QHP: CPT

## 2022-01-28 NOTE — PROGRESS NOTES
PT presents ambulatory to the CPM.  Pt reports she is doing much better and doesn't need to user her cane. PT states about 60-70% improvement with injection and physical therapy for her hip. TARUN Randolph saw PT for Bilateral MBB F/I. See notes for POC.

## 2022-01-28 NOTE — CHRONIC PAIN
[place] for Pain Management follow up     HISTORY OF PRESENT ILLNESS:  Arlette Walden is a 66year old female with chronic axial LBP (L>R) who is s/p bilateral lumbar L3-ala MBBs. SHe reports greater than 80% relief for the duration of the anesthetic.  She re (VITAMIN C) 1000 MG Oral Tab Take 1,000 mg by mouth daily. (Patient not taking: Reported on 12/15/2021)     • HYDROCHLOROTHIAZIDE OR Take 12.5 mg by mouth daily. • aspirin 81 MG Oral Tab Take 81 mg by mouth daily.          ALLERGIES:    Atorvastatin hypertension    • High blood pressure    • High cholesterol    • Hyperlipidemia    • Obstructive apnea     on CPAP Lincterrance   • Paroxysmal A-fib ([facility] 04/28/2021     FAMILY HISTORY:  Family History   Problem Relation Age of Onset   • Diabetes Mother      SO Violence: Not on file  Housing Stability: Not on file  PHYSICAL EXAMINATION:   01/28/22  0947   BP: 140/80   BP Location: Left arm   Patient Position: Sitting   Cuff Size: large   Pulse: 67   SpO2: 97%     General: Alert and oriented x3  Affect:  NAD  Head was formulated. Conservative vs. Aggressive measures were discussed at length including pharmacotherapy (eg. Anti- inflammatories, muscle relaxants, neuropathic medications, oral steroids, analgesics), injections, and further testing.  Risks and benefits of

## 2022-02-01 ENCOUNTER — DOCUMENTATION ONLY (OUTPATIENT)
Dept: PAIN CLINIC | Facility: HOSPITAL | Age: 72
End: 2022-02-01

## 2022-02-01 NOTE — PROGRESS NOTES
Procedure code [address]--- auth needed via [facility] web portal   All clinicals submitted   Case has been approved  auth # [id number]  Order form faxed to the surgery center

## 2022-02-10 ENCOUNTER — OFFICE VISIT (OUTPATIENT)
Dept: FAMILY MEDICINE CLINIC | Facility: CLINIC | Age: 72
End: 2022-02-10
Payer: COMMERCIAL

## 2022-02-10 VITALS
BODY MASS INDEX: 38.8 KG/M2 | SYSTOLIC BLOOD PRESSURE: 108 MMHG | OXYGEN SATURATION: 98 % | WEIGHT: 256 LBS | TEMPERATURE: 98 F | HEART RATE: 86 BPM | HEIGHT: 68 IN | DIASTOLIC BLOOD PRESSURE: 70 MMHG

## 2022-02-10 DIAGNOSIS — M47.812 ARTHROPATHY OF CERVICAL FACET JOINT: ICD-10-CM

## 2022-02-10 DIAGNOSIS — R73.9 HYPERGLYCEMIA: ICD-10-CM

## 2022-02-10 DIAGNOSIS — Z00.00 ENCOUNTER FOR ANNUAL HEALTH EXAMINATION: ICD-10-CM

## 2022-02-10 DIAGNOSIS — K21.9 GASTROESOPHAGEAL REFLUX DISEASE WITHOUT ESOPHAGITIS: ICD-10-CM

## 2022-02-10 DIAGNOSIS — E11.9 TYPE 2 DIABETES MELLITUS WITHOUT COMPLICATION, WITHOUT LONG-TERM CURRENT USE OF INSULIN (HCC): ICD-10-CM

## 2022-02-10 DIAGNOSIS — Z12.31 SCREENING MAMMOGRAM FOR BREAST CANCER: ICD-10-CM

## 2022-02-10 DIAGNOSIS — E78.5 DYSLIPIDEMIA: ICD-10-CM

## 2022-02-10 DIAGNOSIS — N39.3 STRESS INCONTINENCE OF URINE: ICD-10-CM

## 2022-02-10 DIAGNOSIS — R00.0 SINUS TACHYCARDIA: ICD-10-CM

## 2022-02-10 DIAGNOSIS — Z00.00 MEDICARE ANNUAL WELLNESS VISIT, SUBSEQUENT: Primary | ICD-10-CM

## 2022-02-10 DIAGNOSIS — M47.816 LUMBAR FACET ARTHROPATHY: Chronic | ICD-10-CM

## 2022-02-10 DIAGNOSIS — I48.0 PAROXYSMAL ATRIAL FIBRILLATION (HCC): ICD-10-CM

## 2022-02-10 DIAGNOSIS — E78.5 HYPERLIPIDEMIA, UNSPECIFIED HYPERLIPIDEMIA TYPE: ICD-10-CM

## 2022-02-10 DIAGNOSIS — M50.30 DEGENERATIVE DISC DISEASE, CERVICAL: ICD-10-CM

## 2022-02-10 DIAGNOSIS — M48.02 NEURAL FORAMINAL STENOSIS OF CERVICAL SPINE: ICD-10-CM

## 2022-02-10 DIAGNOSIS — M54.42 CHRONIC BILATERAL LOW BACK PAIN WITH BILATERAL SCIATICA: ICD-10-CM

## 2022-02-10 DIAGNOSIS — M54.41 CHRONIC BILATERAL LOW BACK PAIN WITH BILATERAL SCIATICA: ICD-10-CM

## 2022-02-10 DIAGNOSIS — I10 BENIGN ESSENTIAL HTN: ICD-10-CM

## 2022-02-10 DIAGNOSIS — J45.20 MILD INTERMITTENT ASTHMA WITHOUT COMPLICATION: ICD-10-CM

## 2022-02-10 DIAGNOSIS — I25.10 CORONARY ARTERIOSCLEROSIS: ICD-10-CM

## 2022-02-10 DIAGNOSIS — G47.33 OSA (OBSTRUCTIVE SLEEP APNEA): ICD-10-CM

## 2022-02-10 DIAGNOSIS — E66.01 MORBID OBESITY (HCC): ICD-10-CM

## 2022-02-10 DIAGNOSIS — M48.07 SPINAL STENOSIS, LUMBOSACRAL REGION: ICD-10-CM

## 2022-02-10 DIAGNOSIS — G89.29 CHRONIC BILATERAL LOW BACK PAIN WITH BILATERAL SCIATICA: ICD-10-CM

## 2022-02-10 PROBLEM — R07.9 ACUTE CHEST PAIN: Status: RESOLVED | Noted: 2021-04-29 | Resolved: 2022-02-10

## 2022-02-10 PROBLEM — D72.829 LEUKOCYTOSIS: Status: RESOLVED | Noted: 2021-04-29 | Resolved: 2022-02-10

## 2022-02-10 PROCEDURE — G0439 PPPS, SUBSEQ VISIT: HCPCS | Performed by: INTERNAL MEDICINE

## 2022-02-10 PROCEDURE — 36415 COLL VENOUS BLD VENIPUNCTURE: CPT | Performed by: INTERNAL MEDICINE

## 2022-02-10 PROCEDURE — 3074F SYST BP LT 130 MM HG: CPT | Performed by: INTERNAL MEDICINE

## 2022-02-10 PROCEDURE — 3078F DIAST BP <80 MM HG: CPT | Performed by: INTERNAL MEDICINE

## 2022-02-10 PROCEDURE — 99397 PER PM REEVAL EST PAT 65+ YR: CPT | Performed by: INTERNAL MEDICINE

## 2022-02-10 PROCEDURE — 3008F BODY MASS INDEX DOCD: CPT | Performed by: INTERNAL MEDICINE

## 2022-02-10 PROCEDURE — 96160 PT-FOCUSED HLTH RISK ASSMT: CPT | Performed by: INTERNAL MEDICINE

## 2022-02-10 RX ORDER — OMEPRAZOLE 40 MG/1
40 CAPSULE, DELAYED RELEASE ORAL 2 TIMES DAILY
Qty: 90 CAPSULE | Refills: 1 | Status: SHIPPED | OUTPATIENT
Start: 2022-02-10

## 2022-02-11 LAB
ALBUMIN: 4.5 G/DL (ref 3.6–5.1)
ALKALINE PHOSPHATASE: 63 U/L (ref 37–153)
ALT: 20 U/L (ref 6–29)
AST: 19 U/L (ref 10–35)
BILIRUBIN, TOTAL: 0.4 MG/DL (ref 0.2–1.2)
BUN: 12 MG/DL (ref 7–25)
CALCIUM: 10.7 MG/DL (ref 8.6–10.4)
CARBON DIOXIDE: 26 MMOL/L (ref 20–32)
CHLORIDE: 104 MMOL/L (ref 98–110)
CHOL/HDLC RATIO: 2.9 (CALC)
CHOLESTEROL, TOTAL: 175 MG/DL
CREATININE: 0.89 MG/DL (ref 0.6–0.93)
EGFR IF AFRICN AM: 75 ML/MIN/1.73M2
EGFR IF NONAFRICN AM: 65 ML/MIN/1.73M2
GLOBULIN: 2.8 G/DL (CALC) (ref 1.9–3.7)
GLUCOSE: 153 MG/DL (ref 65–99)
HDL CHOLESTEROL: 61 MG/DL
HEMATOCRIT: 36.6 % (ref 35–45)
HEMOGLOBIN A1C: 6 % OF TOTAL HGB
HEMOGLOBIN: 12.1 G/DL (ref 11.7–15.5)
LDL-CHOLESTEROL: 98 MG/DL (CALC)
MCH: 31.5 PG (ref 27–33)
MCHC: 33.1 G/DL (ref 32–36)
MCV: 95.3 FL (ref 80–100)
MPV: 9.6 FL (ref 7.5–12.5)
NON-HDL CHOLESTEROL: 114 MG/DL (CALC)
PLATELET COUNT: 358 THOUSAND/UL (ref 140–400)
PROTEIN, TOTAL: 7.3 G/DL (ref 6.1–8.1)
RDW: 12.4 % (ref 11–15)
RED BLOOD CELL COUNT: 3.84 MILLION/UL (ref 3.8–5.1)
SODIUM: 140 MMOL/L (ref 135–146)
TRIGLYCERIDES: 71 MG/DL
WHITE BLOOD CELL COUNT: 7.8 THOUSAND/UL (ref 3.8–10.8)

## 2022-02-21 ENCOUNTER — APPOINTMENT (OUTPATIENT)
Dept: PHYSICAL THERAPY | Facility: HOSPITAL | Age: 72
End: 2022-02-21
Attending: FAMILY MEDICINE
Payer: MEDICARE

## 2022-02-21 ENCOUNTER — TELEPHONE (OUTPATIENT)
Dept: PHYSICAL THERAPY | Facility: HOSPITAL | Age: 72
End: 2022-02-21

## 2022-02-21 NOTE — TELEPHONE ENCOUNTER
Attempted to call pt's cell phone due to no show this date, however pt did not answer and mailbox was full. Called pt's home number and left a message about missed appt and instructed pt that next appt is on Wednesday. Asked pt to call and let us know if she is unable to make her next appt.

## 2022-02-23 ENCOUNTER — OFFICE VISIT (OUTPATIENT)
Dept: PHYSICAL THERAPY | Facility: HOSPITAL | Age: 72
End: 2022-02-23
Attending: FAMILY MEDICINE
Payer: MEDICARE

## 2022-02-23 PROCEDURE — 97112 NEUROMUSCULAR REEDUCATION: CPT

## 2022-02-23 PROCEDURE — 97110 THERAPEUTIC EXERCISES: CPT

## 2022-02-28 ENCOUNTER — APPOINTMENT (OUTPATIENT)
Dept: PHYSICAL THERAPY | Facility: HOSPITAL | Age: 72
End: 2022-02-28
Attending: FAMILY MEDICINE
Payer: MEDICARE

## 2022-02-28 ENCOUNTER — TELEPHONE (OUTPATIENT)
Dept: FAMILY MEDICINE CLINIC | Facility: CLINIC | Age: 72
End: 2022-02-28

## 2022-02-28 NOTE — TELEPHONE ENCOUNTER
Patient was notified of her results and at the moment she doesn't need any refills, she was recommended to keep her upcoming appt to see Dr Eri Kong.      ----- Message from Amari Luu MD sent at 2/23/2022  9:03 AM CST -----  A1C improved. Should continue jardiance for now. Please see if any refills are needed with her medications. I sent her a [employer] message explaining results.

## 2022-03-01 ENCOUNTER — TELEPHONE (OUTPATIENT)
Dept: PHYSICAL THERAPY | Facility: HOSPITAL | Age: 72
End: 2022-03-01
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
## 2022-03-02 ENCOUNTER — TELEPHONE (OUTPATIENT)
Dept: FAMILY MEDICINE CLINIC | Facility: CLINIC | Age: 72
End: 2022-03-02

## 2022-03-02 ENCOUNTER — APPOINTMENT (OUTPATIENT)
Dept: PHYSICAL THERAPY | Facility: HOSPITAL | Age: 72
End: 2022-03-02
Attending: FAMILY MEDICINE
Payer: MEDICARE

## 2022-03-03 ENCOUNTER — TELEPHONE (OUTPATIENT)
Dept: FAMILY MEDICINE CLINIC | Facility: CLINIC | Age: 72
End: 2022-03-03

## 2022-03-03 RX ORDER — ROSUVASTATIN CALCIUM 10 MG/1
10 TABLET, COATED ORAL NIGHTLY
Qty: 90 TABLET | Refills: 0 | Status: SHIPPED | OUTPATIENT
Start: 2022-03-03

## 2022-03-07 ENCOUNTER — APPOINTMENT (OUTPATIENT)
Dept: PHYSICAL THERAPY | Facility: HOSPITAL | Age: 72
End: 2022-03-07
Attending: FAMILY MEDICINE
Payer: MEDICARE

## 2022-03-10 ENCOUNTER — OFFICE VISIT (OUTPATIENT)
Dept: PAIN CLINIC | Facility: HOSPITAL | Age: 72
End: 2022-03-10
Attending: NURSE PRACTITIONER
Payer: MEDICARE

## 2022-03-10 VITALS — SYSTOLIC BLOOD PRESSURE: 142 MMHG | HEART RATE: 65 BPM | DIASTOLIC BLOOD PRESSURE: 82 MMHG | OXYGEN SATURATION: 99 %

## 2022-03-10 DIAGNOSIS — M47.816 LUMBAR FACET ARTHROPATHY: Primary | ICD-10-CM

## 2022-03-10 DIAGNOSIS — G89.29 CHRONIC BILATERAL LOW BACK PAIN WITH BILATERAL SCIATICA: ICD-10-CM

## 2022-03-10 DIAGNOSIS — M54.42 CHRONIC BILATERAL LOW BACK PAIN WITH BILATERAL SCIATICA: ICD-10-CM

## 2022-03-10 DIAGNOSIS — M48.061 SPINAL STENOSIS OF LUMBAR REGION WITHOUT NEUROGENIC CLAUDICATION: ICD-10-CM

## 2022-03-10 DIAGNOSIS — M54.41 CHRONIC BILATERAL LOW BACK PAIN WITH BILATERAL SCIATICA: ICD-10-CM

## 2022-03-10 DIAGNOSIS — M47.819 FACET ARTHROPATHY: ICD-10-CM

## 2022-03-10 PROCEDURE — 99211 OFF/OP EST MAY X REQ PHY/QHP: CPT

## 2022-03-10 NOTE — PROGRESS NOTES
PT presents ambulatory to the CPM with use of a straight cane and reports new bilateral hip pain. PT finds it difficult to straighten up after sitting with 10/10 pain. TARUN Randolph saw PT for med eval.  See notes for POC.

## 2022-03-21 ENCOUNTER — LAB REQUISITION (OUTPATIENT)
Dept: SURGERY | Age: 72
End: 2022-03-21
Payer: MEDICARE

## 2022-03-22 ENCOUNTER — TELEPHONE (OUTPATIENT)
Dept: FAMILY MEDICINE CLINIC | Facility: CLINIC | Age: 72
End: 2022-03-22

## 2022-03-22 ENCOUNTER — OFFICE VISIT (OUTPATIENT)
Dept: FAMILY MEDICINE CLINIC | Facility: CLINIC | Age: 72
End: 2022-03-22
Payer: COMMERCIAL

## 2022-03-22 VITALS
HEART RATE: 84 BPM | OXYGEN SATURATION: 97 % | SYSTOLIC BLOOD PRESSURE: 130 MMHG | WEIGHT: 259 LBS | TEMPERATURE: 98 F | HEIGHT: 68 IN | DIASTOLIC BLOOD PRESSURE: 74 MMHG | BODY MASS INDEX: 39.25 KG/M2

## 2022-03-22 DIAGNOSIS — M47.812 ARTHROPATHY OF CERVICAL FACET JOINT: Primary | ICD-10-CM

## 2022-03-22 DIAGNOSIS — R23.2 HOT FLASHES: ICD-10-CM

## 2022-03-22 DIAGNOSIS — M25.559 HIP PAIN: ICD-10-CM

## 2022-03-22 DIAGNOSIS — G89.29 CHRONIC PAIN OF BOTH KNEES: ICD-10-CM

## 2022-03-22 DIAGNOSIS — I48.0 PAROXYSMAL ATRIAL FIBRILLATION (HCC): ICD-10-CM

## 2022-03-22 DIAGNOSIS — E11.9 TYPE 2 DIABETES MELLITUS WITHOUT COMPLICATION, WITHOUT LONG-TERM CURRENT USE OF INSULIN (HCC): ICD-10-CM

## 2022-03-22 DIAGNOSIS — M25.561 CHRONIC PAIN OF BOTH KNEES: ICD-10-CM

## 2022-03-22 DIAGNOSIS — M48.07 SPINAL STENOSIS, LUMBOSACRAL REGION: ICD-10-CM

## 2022-03-22 DIAGNOSIS — M25.562 CHRONIC PAIN OF BOTH KNEES: ICD-10-CM

## 2022-03-22 DIAGNOSIS — I10 BENIGN ESSENTIAL HTN: ICD-10-CM

## 2022-03-22 LAB — SARS-COV-2 RNA RESP QL NAA+PROBE: NOT DETECTED

## 2022-03-22 PROCEDURE — 99215 OFFICE O/P EST HI 40 MIN: CPT | Performed by: FAMILY MEDICINE

## 2022-03-22 PROCEDURE — 3075F SYST BP GE 130 - 139MM HG: CPT | Performed by: FAMILY MEDICINE

## 2022-03-22 PROCEDURE — 3008F BODY MASS INDEX DOCD: CPT | Performed by: FAMILY MEDICINE

## 2022-03-22 PROCEDURE — 3078F DIAST BP <80 MM HG: CPT | Performed by: FAMILY MEDICINE

## 2022-03-22 RX ORDER — PAROXETINE 10 MG/1
10 TABLET, FILM COATED ORAL EVERY MORNING
Refills: 0 | Status: CANCELLED | OUTPATIENT
Start: 2022-03-22

## 2022-03-22 RX ORDER — PAROXETINE 7.5 MG/1
1 CAPSULE ORAL NIGHTLY
Qty: 30 CAPSULE | Refills: 2 | Status: SHIPPED | OUTPATIENT
Start: 2022-03-22 | End: 2022-03-24

## 2022-03-22 RX ORDER — FLUTICASONE PROPIONATE 50 MCG
SPRAY, SUSPENSION (ML) NASAL
Qty: 48 G | Refills: 0 | Status: SHIPPED | OUTPATIENT
Start: 2022-03-22

## 2022-03-22 RX ORDER — FLUTICASONE PROPIONATE 50 MCG
2 SPRAY, SUSPENSION (ML) NASAL DAILY
Qty: 1 EACH | Refills: 0 | Status: SHIPPED | OUTPATIENT
Start: 2022-03-22 | End: 2022-03-22

## 2022-03-22 NOTE — TELEPHONE ENCOUNTER
Pt called stating that the medication send to pharmacy today PARoxetine Mesylate 7.5 MG Oral Cap needs a prior auth.     To please inform her when this has been approved

## 2022-03-24 RX ORDER — CITALOPRAM 10 MG/1
10 TABLET ORAL DAILY
Qty: 30 TABLET | Refills: 2 | Status: SHIPPED | OUTPATIENT
Start: 2022-03-24

## 2022-03-30 ENCOUNTER — TELEPHONE (OUTPATIENT)
Dept: FAMILY MEDICINE CLINIC | Facility: CLINIC | Age: 72
End: 2022-03-30

## 2022-03-30 NOTE — TELEPHONE ENCOUNTER
Pt called asking if the orthopedic doctor she was referred to, is going to also be able to treat her for her upper hip issue, since every time she bends over its hard for her to go back up    Please call back and advise

## 2022-04-02 RX ORDER — MONTELUKAST SODIUM 10 MG/1
10 TABLET ORAL DAILY
Qty: 90 TABLET | Refills: 0 | Status: SHIPPED | OUTPATIENT
Start: 2022-04-02 | End: 2022-07-14

## 2022-04-07 ENCOUNTER — OFFICE VISIT (OUTPATIENT)
Dept: PAIN CLINIC | Facility: HOSPITAL | Age: 72
End: 2022-04-07
Attending: ANESTHESIOLOGY
Payer: MEDICARE

## 2022-04-07 VITALS
WEIGHT: 259 LBS | HEART RATE: 72 BPM | BODY MASS INDEX: 39.25 KG/M2 | SYSTOLIC BLOOD PRESSURE: 127 MMHG | HEIGHT: 68 IN | DIASTOLIC BLOOD PRESSURE: 71 MMHG | RESPIRATION RATE: 18 BRPM

## 2022-04-07 DIAGNOSIS — M48.07 SPINAL STENOSIS, LUMBOSACRAL REGION: ICD-10-CM

## 2022-04-07 DIAGNOSIS — E66.01 MORBID OBESITY (HCC): ICD-10-CM

## 2022-04-07 DIAGNOSIS — M47.816 LUMBAR FACET ARTHROPATHY: Primary | Chronic | ICD-10-CM

## 2022-04-07 PROCEDURE — 99211 OFF/OP EST MAY X REQ PHY/QHP: CPT

## 2022-04-07 RX ORDER — MELOXICAM 15 MG/1
15 TABLET ORAL DAILY
Qty: 30 TABLET | Refills: 2 | Status: SHIPPED | OUTPATIENT
Start: 2022-04-07

## 2022-04-07 NOTE — PROGRESS NOTES
4/7/2022-Presents ambulatory with cane to CPM;  F/U POST  03/23/2022---BILAT. LUMB L3-L5/S1-MBB RFA-GureVvicius; Pt reports  40-50%; still having bilateral hip pain 6/10-but she has appt. With [facility] for tx;  Seen by Dr. Jaqui Duggan; refer to dictation for the continued plan of care.

## 2022-04-11 RX ORDER — EMPAGLIFLOZIN 25 MG/1
1 TABLET, FILM COATED ORAL
Qty: 90 TABLET | Refills: 0 | Status: SHIPPED | OUTPATIENT
Start: 2022-04-11

## 2022-04-11 NOTE — TELEPHONE ENCOUNTER
Pt called requesting refills for Empagliflozin (JARDIANCE) 25 MG Oral Tab  Pt wants to be informed when this has been sent

## 2022-04-14 RX ORDER — EMPAGLIFLOZIN 25 MG/1
1 TABLET, FILM COATED ORAL
Qty: 90 TABLET | Refills: 0 | Status: SHIPPED | OUTPATIENT
Start: 2022-04-14

## 2022-04-21 ENCOUNTER — OFFICE VISIT (OUTPATIENT)
Dept: ORTHOPEDICS CLINIC | Facility: CLINIC | Age: 72
End: 2022-04-21
Payer: COMMERCIAL

## 2022-04-21 DIAGNOSIS — M16.0 PRIMARY OSTEOARTHRITIS OF BOTH HIPS: Primary | ICD-10-CM

## 2022-04-21 PROCEDURE — 99204 OFFICE O/P NEW MOD 45 MIN: CPT | Performed by: ORTHOPAEDIC SURGERY

## 2022-04-26 DIAGNOSIS — I10 BENIGN ESSENTIAL HYPERTENSION: ICD-10-CM

## 2022-04-26 RX ORDER — DILTIAZEM HYDROCHLORIDE 180 MG/1
CAPSULE, COATED, EXTENDED RELEASE ORAL
Qty: 90 CAPSULE | Refills: 3 | OUTPATIENT
Start: 2022-04-26

## 2022-04-29 ENCOUNTER — TELEPHONE (OUTPATIENT)
Dept: ORTHOPEDICS CLINIC | Facility: CLINIC | Age: 72
End: 2022-04-29

## 2022-04-29 NOTE — TELEPHONE ENCOUNTER
Patient indicates she has two procedures on 5/4 and 5/11 and asking about anesthesia. Patient was also told not to take anything for pain for 14 days and having a difficult time. Please call a [phone],[facility].

## 2022-05-02 ENCOUNTER — TELEPHONE (OUTPATIENT)
Dept: ORTHOPEDICS CLINIC | Facility: CLINIC | Age: 72
End: 2022-05-02

## 2022-05-02 RX ORDER — DILTIAZEM HYDROCHLORIDE 300 MG/1
CAPSULE, COATED, EXTENDED RELEASE ORAL
Qty: 30 CAPSULE | Refills: 2 | Status: SHIPPED | OUTPATIENT
Start: 2022-05-02

## 2022-05-02 RX ORDER — DILTIAZEM HYDROCHLORIDE 300 MG/1
CAPSULE, COATED, EXTENDED RELEASE ORAL
Qty: 30 CAPSULE | Refills: 2 | OUTPATIENT
Start: 2022-05-02

## 2022-05-02 NOTE — TELEPHONE ENCOUNTER
Asp/inj w/ fluoro to bilateral hips ordered on 4/21/22. Pt is scheduled on 5/4/22 at radiology. Radiology wants to confirm your orders on what should be injected to bilateral hips?

## 2022-05-09 ENCOUNTER — TELEPHONE (OUTPATIENT)
Dept: INTERNAL MEDICINE CLINIC | Facility: CLINIC | Age: 72
End: 2022-05-09

## 2022-05-09 NOTE — TELEPHONE ENCOUNTER
Omeprazole 40 MG Oral Capsule Delayed Release     Last prescription was for qty of 90tab patient is taking 2bid that's only 45 days. Please send new rx qty 180 and patient would like a call back.

## 2022-05-11 RX ORDER — OMEPRAZOLE 40 MG/1
40 CAPSULE, DELAYED RELEASE ORAL DAILY
Qty: 90 CAPSULE | Refills: 0 | Status: SHIPPED | OUTPATIENT
Start: 2022-05-11

## 2022-05-11 NOTE — TELEPHONE ENCOUNTER
I agree with Dr. Estephania Peñaloza. Omeprazole should be daily; higher doses need to be then given by GI instead. I've never prescribed it. She had external Rx for 40mg BID and the 1st refill provided by our office was given by Dr. Axel Nagel. Please change it to qdaily instead of BID and notify pt.

## 2022-05-13 RX ORDER — FLUTICASONE PROPIONATE 50 MCG
SPRAY, SUSPENSION (ML) NASAL
Qty: 48 G | Refills: 0 | OUTPATIENT
Start: 2022-05-13

## 2022-05-19 ENCOUNTER — OFFICE VISIT (OUTPATIENT)
Dept: ORTHOPEDICS CLINIC | Facility: CLINIC | Age: 72
End: 2022-05-19
Payer: COMMERCIAL

## 2022-05-19 ENCOUNTER — HOSPITAL ENCOUNTER (OUTPATIENT)
Dept: GENERAL RADIOLOGY | Facility: HOSPITAL | Age: 72
Discharge: HOME | End: 2022-05-19
Attending: ORTHOPAEDIC SURGERY
Payer: MEDICARE

## 2022-05-19 ENCOUNTER — LAB ENCOUNTER (OUTPATIENT)
Dept: LAB | Facility: HOSPITAL | Age: 72
End: 2022-05-19
Attending: ORTHOPAEDIC SURGERY
Payer: MEDICARE

## 2022-05-19 VITALS — HEART RATE: 66 BPM | SYSTOLIC BLOOD PRESSURE: 130 MMHG | DIASTOLIC BLOOD PRESSURE: 81 MMHG

## 2022-05-19 DIAGNOSIS — R52 PAIN: ICD-10-CM

## 2022-05-19 DIAGNOSIS — Z96.653 PAIN OF BOTH LOWER EXTREMITIES DUE TO BILATERAL TOTAL KNEE REPLACEMENTS, INITIAL ENCOUNTER (HCC): Primary | ICD-10-CM

## 2022-05-19 DIAGNOSIS — T84.84XA PAIN OF BOTH LOWER EXTREMITIES DUE TO BILATERAL TOTAL KNEE REPLACEMENTS, INITIAL ENCOUNTER (HCC): Primary | ICD-10-CM

## 2022-05-19 LAB
CRP SERPL-MCNC: <0.29 MG/DL (ref ?–0.3)
ERYTHROCYTE [SEDIMENTATION RATE] IN BLOOD: 10 MM/HR

## 2022-05-19 PROCEDURE — 3079F DIAST BP 80-89 MM HG: CPT | Performed by: ORTHOPAEDIC SURGERY

## 2022-05-19 PROCEDURE — 36415 COLL VENOUS BLD VENIPUNCTURE: CPT | Performed by: ORTHOPAEDIC SURGERY

## 2022-05-19 PROCEDURE — 85652 RBC SED RATE AUTOMATED: CPT | Performed by: ORTHOPAEDIC SURGERY

## 2022-05-19 PROCEDURE — 86140 C-REACTIVE PROTEIN: CPT | Performed by: ORTHOPAEDIC SURGERY

## 2022-05-19 PROCEDURE — 99214 OFFICE O/P EST MOD 30 MIN: CPT | Performed by: ORTHOPAEDIC SURGERY

## 2022-05-19 PROCEDURE — 73562 X-RAY EXAM OF KNEE 3: CPT | Performed by: ORTHOPAEDIC SURGERY

## 2022-05-19 PROCEDURE — 3075F SYST BP GE 130 - 139MM HG: CPT | Performed by: ORTHOPAEDIC SURGERY

## 2022-05-25 ENCOUNTER — TELEPHONE (OUTPATIENT)
Dept: INTERNAL MEDICINE CLINIC | Facility: CLINIC | Age: 72
End: 2022-05-25

## 2022-05-25 RX ORDER — FENOFIBRATE 200 MG/1
200 CAPSULE ORAL NIGHTLY
Qty: 90 CAPSULE | Refills: 0 | Status: SHIPPED | OUTPATIENT
Start: 2022-05-25

## 2022-06-02 RX ORDER — ROSUVASTATIN CALCIUM 10 MG/1
TABLET, COATED ORAL
Qty: 90 TABLET | Refills: 0 | Status: SHIPPED | OUTPATIENT
Start: 2022-06-02

## 2022-06-03 RX ORDER — ROSUVASTATIN CALCIUM 10 MG/1
TABLET, COATED ORAL
Qty: 90 TABLET | Refills: 0 | OUTPATIENT
Start: 2022-06-03

## 2022-06-06 ENCOUNTER — TELEPHONE (OUTPATIENT)
Dept: INTERNAL MEDICINE CLINIC | Facility: CLINIC | Age: 72
End: 2022-06-06

## 2022-06-06 ENCOUNTER — TELEMEDICINE (OUTPATIENT)
Dept: INTERNAL MEDICINE CLINIC | Facility: CLINIC | Age: 72
End: 2022-06-06
Payer: COMMERCIAL

## 2022-06-06 DIAGNOSIS — R19.7 DIARRHEA OF PRESUMED INFECTIOUS ORIGIN: ICD-10-CM

## 2022-06-06 DIAGNOSIS — H10.9 CONJUNCTIVITIS OF RIGHT EYE, UNSPECIFIED CONJUNCTIVITIS TYPE: Primary | ICD-10-CM

## 2022-06-06 PROCEDURE — 99213 OFFICE O/P EST LOW 20 MIN: CPT | Performed by: FAMILY MEDICINE

## 2022-06-06 RX ORDER — POLYMYXIN B SULFATE AND TRIMETHOPRIM 1; 10000 MG/ML; [USP'U]/ML
1 SOLUTION OPHTHALMIC EVERY 6 HOURS
Qty: 1 EACH | Refills: 0 | Status: SHIPPED | OUTPATIENT
Start: 2022-06-06 | End: 2022-06-11

## 2022-06-06 NOTE — TELEPHONE ENCOUNTER
Patient states she's been having diarrhea for the past five days and pink eye for the past two days. Patient states she asked her pharmacist what she can take but he recommended her to follow up with her  pcp to make sure she's taking the correct medication and dosis. No covid test  No other symptoms beside the pink eye and diarrhea. Nhan Viveros

## 2022-06-06 NOTE — TELEPHONE ENCOUNTER
Patient wants to know if Madeline Bumpers is calling her again. Patient had a video visit but the call dropped. Please call and advise.

## 2022-06-06 NOTE — TELEPHONE ENCOUNTER
Discussed with Dr. Viktoriya Alcantara. Ok to add as doxi visit today. Next Appt:    With  INTERNAL MEDICINE (Althea Mcardle, MD)  06/06/2022 at 2:00 PM

## 2022-06-07 ENCOUNTER — HOSPITAL ENCOUNTER (OUTPATIENT)
Dept: GENERAL RADIOLOGY | Facility: HOSPITAL | Age: 72
Discharge: HOME | End: 2022-06-07
Attending: ORTHOPAEDIC SURGERY
Payer: MEDICARE

## 2022-06-07 DIAGNOSIS — M16.0 PRIMARY OSTEOARTHRITIS OF BOTH HIPS: ICD-10-CM

## 2022-06-07 PROCEDURE — 20610 DRAIN/INJ JOINT/BURSA W/O US: CPT | Performed by: ORTHOPAEDIC SURGERY

## 2022-06-07 PROCEDURE — 77002 NEEDLE LOCALIZATION BY XRAY: CPT | Performed by: ORTHOPAEDIC SURGERY

## 2022-06-07 RX ORDER — TRIAMCINOLONE ACETONIDE 40 MG/ML
INJECTION, SUSPENSION INTRA-ARTICULAR; INTRAMUSCULAR
Status: COMPLETED
Start: 2022-06-07 | End: 2022-06-07

## 2022-06-07 RX ORDER — LIDOCAINE HYDROCHLORIDE 10 MG/ML
INJECTION, SOLUTION EPIDURAL; INFILTRATION; INTRACAUDAL; PERINEURAL
Status: COMPLETED
Start: 2022-06-07 | End: 2022-06-07

## 2022-06-07 RX ADMIN — LIDOCAINE HYDROCHLORIDE 10 ML: 10 INJECTION, SOLUTION EPIDURAL; INFILTRATION; INTRACAUDAL; PERINEURAL at 10:35:00

## 2022-06-07 RX ADMIN — LIDOCAINE HYDROCHLORIDE 5 ML: 10 INJECTION, SOLUTION EPIDURAL; INFILTRATION; INTRACAUDAL; PERINEURAL at 10:35:00

## 2022-06-09 ENCOUNTER — HOSPITAL ENCOUNTER (OUTPATIENT)
Dept: GENERAL RADIOLOGY | Facility: HOSPITAL | Age: 72
Discharge: HOME | End: 2022-06-09
Attending: ORTHOPAEDIC SURGERY
Payer: MEDICARE

## 2022-06-09 DIAGNOSIS — M16.0 PRIMARY OSTEOARTHRITIS OF BOTH HIPS: ICD-10-CM

## 2022-06-09 PROCEDURE — 20610 DRAIN/INJ JOINT/BURSA W/O US: CPT | Performed by: ORTHOPAEDIC SURGERY

## 2022-06-09 PROCEDURE — 77002 NEEDLE LOCALIZATION BY XRAY: CPT | Performed by: ORTHOPAEDIC SURGERY

## 2022-06-09 RX ORDER — LIDOCAINE HYDROCHLORIDE 10 MG/ML
INJECTION, SOLUTION EPIDURAL; INFILTRATION; INTRACAUDAL; PERINEURAL
Status: COMPLETED
Start: 2022-06-09 | End: 2022-06-09

## 2022-06-09 RX ORDER — TRIAMCINOLONE ACETONIDE 40 MG/ML
INJECTION, SUSPENSION INTRA-ARTICULAR; INTRAMUSCULAR
Status: COMPLETED
Start: 2022-06-09 | End: 2022-06-09

## 2022-06-09 RX ORDER — LIDOCAINE HYDROCHLORIDE 10 MG/ML
5 INJECTION, SOLUTION EPIDURAL; INFILTRATION; INTRACAUDAL; PERINEURAL ONCE
Status: COMPLETED | OUTPATIENT
Start: 2022-06-09 | End: 2022-06-09

## 2022-06-09 RX ORDER — TRIAMCINOLONE ACETONIDE 40 MG/ML
40 INJECTION, SUSPENSION INTRA-ARTICULAR; INTRAMUSCULAR ONCE
Status: COMPLETED | OUTPATIENT
Start: 2022-06-09 | End: 2022-06-09

## 2022-06-09 RX ADMIN — TRIAMCINOLONE ACETONIDE 40 MG: 40 INJECTION, SUSPENSION INTRA-ARTICULAR; INTRAMUSCULAR at 10:48:00

## 2022-06-09 RX ADMIN — LIDOCAINE HYDROCHLORIDE 5 ML: 10 INJECTION, SOLUTION EPIDURAL; INFILTRATION; INTRACAUDAL; PERINEURAL at 10:45:00

## 2022-06-16 ENCOUNTER — OFFICE VISIT (OUTPATIENT)
Dept: ORTHOPEDICS CLINIC | Facility: CLINIC | Age: 72
End: 2022-06-16
Payer: COMMERCIAL

## 2022-06-16 VITALS — SYSTOLIC BLOOD PRESSURE: 131 MMHG | HEART RATE: 72 BPM | DIASTOLIC BLOOD PRESSURE: 78 MMHG

## 2022-06-16 DIAGNOSIS — M25.362 INSTABILITY OF LEFT KNEE JOINT: Primary | ICD-10-CM

## 2022-06-16 DIAGNOSIS — M25.361 INSTABILITY OF KNEE JOINT, RIGHT: ICD-10-CM

## 2022-06-16 PROCEDURE — 1125F AMNT PAIN NOTED PAIN PRSNT: CPT | Performed by: ORTHOPAEDIC SURGERY

## 2022-06-16 PROCEDURE — 3078F DIAST BP <80 MM HG: CPT | Performed by: ORTHOPAEDIC SURGERY

## 2022-06-16 PROCEDURE — 3075F SYST BP GE 130 - 139MM HG: CPT | Performed by: ORTHOPAEDIC SURGERY

## 2022-06-16 PROCEDURE — 99213 OFFICE O/P EST LOW 20 MIN: CPT | Performed by: ORTHOPAEDIC SURGERY

## 2022-06-17 ENCOUNTER — OFFICE VISIT (OUTPATIENT)
Dept: NEUROLOGY | Facility: CLINIC | Age: 72
End: 2022-06-17
Payer: COMMERCIAL

## 2022-06-17 VITALS
BODY MASS INDEX: 36.68 KG/M2 | DIASTOLIC BLOOD PRESSURE: 74 MMHG | SYSTOLIC BLOOD PRESSURE: 130 MMHG | HEART RATE: 72 BPM | WEIGHT: 242 LBS | HEIGHT: 68 IN

## 2022-06-17 DIAGNOSIS — M79.2 NEUROPATHIC PAIN: Primary | ICD-10-CM

## 2022-06-17 DIAGNOSIS — R29.898 HAND WEAKNESS: ICD-10-CM

## 2022-06-17 DIAGNOSIS — G56.03 BILATERAL CARPAL TUNNEL SYNDROME: ICD-10-CM

## 2022-06-17 RX ORDER — PREGABALIN 300 MG/1
300 CAPSULE ORAL 2 TIMES DAILY
Qty: 180 CAPSULE | Refills: 3 | Status: SHIPPED | OUTPATIENT
Start: 2022-06-17 | End: 2023-06-17

## 2022-06-29 RX ORDER — CITALOPRAM 10 MG/1
TABLET ORAL
Qty: 30 TABLET | Refills: 2 | Status: SHIPPED | OUTPATIENT
Start: 2022-06-29

## 2022-07-03 DIAGNOSIS — K21.9 GASTROESOPHAGEAL REFLUX DISEASE WITHOUT ESOPHAGITIS: ICD-10-CM

## 2022-07-05 RX ORDER — OMEPRAZOLE 40 MG/1
CAPSULE, DELAYED RELEASE ORAL
Qty: 180 CAPSULE | Refills: 1 | Status: SHIPPED | OUTPATIENT
Start: 2022-07-05

## 2022-07-08 ENCOUNTER — OFFICE VISIT (OUTPATIENT)
Dept: NEUROLOGY | Facility: CLINIC | Age: 72
End: 2022-07-08
Payer: COMMERCIAL

## 2022-07-08 ENCOUNTER — LAB ENCOUNTER (OUTPATIENT)
Dept: LAB | Facility: HOSPITAL | Age: 72
End: 2022-07-08
Attending: Other
Payer: MEDICARE

## 2022-07-08 VITALS
DIASTOLIC BLOOD PRESSURE: 70 MMHG | BODY MASS INDEX: 36.88 KG/M2 | HEIGHT: 69 IN | WEIGHT: 249 LBS | SYSTOLIC BLOOD PRESSURE: 124 MMHG

## 2022-07-08 DIAGNOSIS — R41.3 MEMORY CHANGES: Primary | ICD-10-CM

## 2022-07-08 LAB
FOLATE SERPL-MCNC: 8.7 NG/ML (ref 8.7–?)
TSI SER-ACNC: 1.59 MIU/ML (ref 0.36–3.74)
VIT B12 SERPL-MCNC: 318 PG/ML (ref 193–986)

## 2022-07-08 PROCEDURE — 82746 ASSAY OF FOLIC ACID SERUM: CPT | Performed by: OTHER

## 2022-07-08 PROCEDURE — 3008F BODY MASS INDEX DOCD: CPT | Performed by: OTHER

## 2022-07-08 PROCEDURE — 3078F DIAST BP <80 MM HG: CPT | Performed by: OTHER

## 2022-07-08 PROCEDURE — 84443 ASSAY THYROID STIM HORMONE: CPT | Performed by: OTHER

## 2022-07-08 PROCEDURE — 82607 VITAMIN B-12: CPT | Performed by: OTHER

## 2022-07-08 PROCEDURE — 3074F SYST BP LT 130 MM HG: CPT | Performed by: OTHER

## 2022-07-08 PROCEDURE — 36415 COLL VENOUS BLD VENIPUNCTURE: CPT | Performed by: OTHER

## 2022-07-08 PROCEDURE — 86780 TREPONEMA PALLIDUM: CPT | Performed by: OTHER

## 2022-07-08 PROCEDURE — 99215 OFFICE O/P EST HI 40 MIN: CPT | Performed by: OTHER

## 2022-07-08 NOTE — PATIENT INSTRUCTIONS
1.  I think you have mild cognitive impairment AT MOST. I don't think you have dementia. Mild cognitive impairment is reversible. You can live independently right now, and likely for the next few years. We need to do more memory testing. Please look for the call from [facility] to give you the list of psychologists. 2. Use a pill box. Use reminders  3. Please get the mri of your brain  4.  Please get the blood work

## 2022-07-11 LAB — T PALLIDUM AB SER QL: NEGATIVE

## 2022-07-14 RX ORDER — MONTELUKAST SODIUM 10 MG/1
TABLET ORAL
Qty: 90 TABLET | Refills: 0 | Status: SHIPPED | OUTPATIENT
Start: 2022-07-14

## 2022-07-15 RX ORDER — MONTELUKAST SODIUM 10 MG/1
TABLET ORAL
Qty: 90 TABLET | Refills: 0 | OUTPATIENT
Start: 2022-07-15

## 2022-07-21 ENCOUNTER — TELEPHONE (OUTPATIENT)
Dept: INTERNAL MEDICINE CLINIC | Facility: CLINIC | Age: 72
End: 2022-07-21

## 2022-07-22 ENCOUNTER — OFFICE VISIT (OUTPATIENT)
Dept: UROLOGY | Facility: HOSPITAL | Age: 72
End: 2022-07-22
Attending: OBSTETRICS & GYNECOLOGY
Payer: MEDICARE

## 2022-07-22 VITALS — RESPIRATION RATE: 20 BRPM | HEIGHT: 69 IN | WEIGHT: 249 LBS | BODY MASS INDEX: 36.88 KG/M2

## 2022-07-22 DIAGNOSIS — N32.81 DETRUSOR INSTABILITY: Primary | ICD-10-CM

## 2022-07-22 DIAGNOSIS — N95.2 POSTMENOPAUSAL ATROPHIC VAGINITIS: ICD-10-CM

## 2022-07-22 DIAGNOSIS — N39.41 URGE URINARY INCONTINENCE: ICD-10-CM

## 2022-07-22 PROCEDURE — 99212 OFFICE O/P EST SF 10 MIN: CPT

## 2022-07-22 RX ORDER — VIBEGRON 75 MG/1
75 TABLET, FILM COATED ORAL DAILY
Qty: 90 TABLET | Refills: 3 | Status: SHIPPED | OUTPATIENT
Start: 2022-07-22 | End: 2022-08-21

## 2022-07-27 ENCOUNTER — IMMUNIZATION (OUTPATIENT)
Dept: LAB | Age: 72
End: 2022-07-27
Attending: EMERGENCY MEDICINE
Payer: MEDICARE

## 2022-07-27 DIAGNOSIS — Z23 NEED FOR VACCINATION: Primary | ICD-10-CM

## 2022-07-27 PROCEDURE — 0054A SARSCOV2 VAC 30MCG TRS SUCR: CPT

## 2022-08-02 ENCOUNTER — TELEPHONE (OUTPATIENT)
Dept: UROLOGY | Facility: HOSPITAL | Age: 72
End: 2022-08-02

## 2022-08-02 NOTE — TELEPHONE ENCOUNTER
TC from Tyler Adam reporting that her medication Jah Mccracken can not be filled due to insurance. Informed Tyler Adam that we will do a prior authorization to get approval.  Informed her that this may take up to 72 hours to hear back from insurance. Patient verbalized understanding.   Prior Authorization done through cover my meds

## 2022-08-08 ENCOUNTER — TELEPHONE (OUTPATIENT)
Dept: UROLOGY | Facility: HOSPITAL | Age: 72
End: 2022-08-08

## 2022-08-08 ENCOUNTER — TELEPHONE (OUTPATIENT)
Dept: INTERNAL MEDICINE CLINIC | Facility: CLINIC | Age: 72
End: 2022-08-08

## 2022-08-08 RX ORDER — VIBEGRON 75 MG/1
TABLET, FILM COATED ORAL
Qty: 30 TABLET | Refills: 0 | Status: CANCELLED | OUTPATIENT
Start: 2022-08-08 | End: 2022-09-07

## 2022-08-08 RX ORDER — ONDANSETRON 4 MG/1
4 TABLET, FILM COATED ORAL EVERY 8 HOURS PRN
Qty: 15 TABLET | Refills: 0 | Status: SHIPPED | OUTPATIENT
Start: 2022-08-08

## 2022-08-08 NOTE — TELEPHONE ENCOUNTER
Pt is calling stating that has been having stomach pain on and off for 3 months. Pt did state that she feels like vomiting, no fever or diarrhea.           Please call and advise

## 2022-08-08 NOTE — TELEPHONE ENCOUNTER
Offer SDA at next available. In the interim, antiemetic will be sent to pharmacy. [employer] message sent.

## 2022-08-08 NOTE — TELEPHONE ENCOUNTER
TC to [employer] in [place] to inform them that the prior authorization has been approved . Eve informed me that the patient picked up the medication 4 days ago at no cost to her.

## 2022-08-11 RX ORDER — DILTIAZEM HYDROCHLORIDE 300 MG/1
CAPSULE, COATED, EXTENDED RELEASE ORAL
Qty: 30 CAPSULE | OUTPATIENT
Start: 2022-08-11

## 2022-08-19 ENCOUNTER — TELEPHONE (OUTPATIENT)
Dept: UROLOGY | Facility: HOSPITAL | Age: 72
End: 2022-08-19

## 2022-08-19 NOTE — TELEPHONE ENCOUNTER
Spoke with Tu Reyes which had a televisit today with Cori for medication follow up. Patient was delayed starting Gemtesa d/t PA. Pt started medication 2 weeks ago so we will reschedule her televisit for 4 weeks from today. Patient verbalized understanding. .  Transferred patient to [facility] to reschedule appointment.

## 2022-08-22 RX ORDER — ONDANSETRON 4 MG/1
4 TABLET, FILM COATED ORAL EVERY 8 HOURS PRN
Qty: 15 TABLET | Refills: 0 | Status: SHIPPED | OUTPATIENT
Start: 2022-08-22

## 2022-08-23 ENCOUNTER — TELEPHONE (OUTPATIENT)
Dept: INTERNAL MEDICINE CLINIC | Facility: CLINIC | Age: 72
End: 2022-08-23

## 2022-08-23 DIAGNOSIS — R41.3 MEMORY CHANGES: Primary | ICD-10-CM

## 2022-08-23 RX ORDER — DILTIAZEM HYDROCHLORIDE 300 MG/1
CAPSULE, COATED, EXTENDED RELEASE ORAL
Qty: 30 CAPSULE | Refills: 2 | Status: SHIPPED | OUTPATIENT
Start: 2022-08-23

## 2022-08-23 NOTE — TELEPHONE ENCOUNTER
Pt called requesting a referral for behavioral health for her alzheimer, but first wants to know if doctor recommends for her to have the MRI of the brain first before going to behavioral healt? ?   To please call her back

## 2022-08-29 RX ORDER — FENOFIBRATE 200 MG/1
CAPSULE ORAL
Qty: 90 CAPSULE | Refills: 0 | Status: SHIPPED | OUTPATIENT
Start: 2022-08-29

## 2022-09-02 DIAGNOSIS — I10 BENIGN ESSENTIAL HYPERTENSION: ICD-10-CM

## 2022-09-03 RX ORDER — HYDROCHLOROTHIAZIDE 12.5 MG/1
12.5 CAPSULE, GELATIN COATED ORAL DAILY
Qty: 90 CAPSULE | Refills: 3 | Status: SHIPPED | OUTPATIENT
Start: 2022-09-03

## 2022-09-06 ENCOUNTER — HOSPITAL ENCOUNTER (OUTPATIENT)
Dept: MRI IMAGING | Facility: HOSPITAL | Age: 72
Discharge: HOME | End: 2022-09-06
Attending: Other
Payer: MEDICARE

## 2022-09-06 DIAGNOSIS — R41.3 MEMORY CHANGES: ICD-10-CM

## 2022-09-06 PROCEDURE — 70551 MRI BRAIN STEM W/O DYE: CPT | Performed by: OTHER

## 2022-09-06 RX ORDER — ONDANSETRON 4 MG/1
4 TABLET, FILM COATED ORAL EVERY 8 HOURS PRN
Qty: 10 TABLET | Refills: 0 | Status: SHIPPED | OUTPATIENT
Start: 2022-09-06 | End: 2022-10-13

## 2022-09-06 NOTE — TELEPHONE ENCOUNTER
Patient is calling in requesting a refill request for Ondansetron. States prior authorization is needed for prescription. Please send to [place] in [facility] on [address]

## 2022-09-15 ENCOUNTER — VIRTUAL PHONE E/M (OUTPATIENT)
Dept: UROLOGY | Facility: HOSPITAL | Age: 72
End: 2022-09-15
Attending: STUDENT IN AN ORGANIZED HEALTH CARE EDUCATION/TRAINING PROGRAM
Payer: MEDICARE

## 2022-09-15 DIAGNOSIS — N39.41 URGE URINARY INCONTINENCE: ICD-10-CM

## 2022-09-15 DIAGNOSIS — R39.15 URINARY URGENCY: ICD-10-CM

## 2022-09-15 DIAGNOSIS — N32.81 DETRUSOR INSTABILITY: Primary | ICD-10-CM

## 2022-09-19 ENCOUNTER — TELEPHONE (OUTPATIENT)
Dept: INTERNAL MEDICINE CLINIC | Facility: CLINIC | Age: 72
End: 2022-09-19

## 2022-09-19 RX ORDER — ROSUVASTATIN CALCIUM 10 MG/1
TABLET, COATED ORAL
Qty: 90 TABLET | Refills: 0 | Status: SHIPPED | OUTPATIENT
Start: 2022-09-19

## 2022-09-19 NOTE — TELEPHONE ENCOUNTER
Patient is calling in refill request for Rosuvastatin and Ondansetron. Please send to [facility] in [facility]. Patient would like to know why she only gets a fill of 10 tablets for Ondansetron.

## 2022-09-22 ENCOUNTER — OFFICE VISIT (OUTPATIENT)
Dept: ORTHOPEDICS CLINIC | Facility: CLINIC | Age: 72
End: 2022-09-22

## 2022-09-22 VITALS — SYSTOLIC BLOOD PRESSURE: 132 MMHG | HEART RATE: 72 BPM | DIASTOLIC BLOOD PRESSURE: 72 MMHG

## 2022-09-22 DIAGNOSIS — M25.361 INSTABILITY OF KNEE JOINT, RIGHT: ICD-10-CM

## 2022-09-22 DIAGNOSIS — M25.362 INSTABILITY OF LEFT KNEE JOINT: ICD-10-CM

## 2022-09-22 DIAGNOSIS — M16.0 PRIMARY OSTEOARTHRITIS OF BOTH HIPS: Primary | ICD-10-CM

## 2022-09-22 PROCEDURE — 3075F SYST BP GE 130 - 139MM HG: CPT | Performed by: ORTHOPAEDIC SURGERY

## 2022-09-22 PROCEDURE — 3078F DIAST BP <80 MM HG: CPT | Performed by: ORTHOPAEDIC SURGERY

## 2022-09-22 PROCEDURE — 99213 OFFICE O/P EST LOW 20 MIN: CPT | Performed by: ORTHOPAEDIC SURGERY

## 2022-09-22 PROCEDURE — 1125F AMNT PAIN NOTED PAIN PRSNT: CPT | Performed by: ORTHOPAEDIC SURGERY

## 2022-10-04 ENCOUNTER — TELEPHONE (OUTPATIENT)
Dept: ORTHOPEDICS CLINIC | Facility: CLINIC | Age: 72
End: 2022-10-04

## 2022-10-04 NOTE — TELEPHONE ENCOUNTER
Radiology inquiring about orders for Left hip and right hip injections under fluoro. Order doesn't specify meds- do you want them to inject 3ml 0.5% marcaine, 2ml of 1% lidocaine and 1ml of Kenalog 40 into each hip? Pt is scheduled on 10/5.

## 2022-10-05 ENCOUNTER — HOSPITAL ENCOUNTER (OUTPATIENT)
Dept: GENERAL RADIOLOGY | Facility: HOSPITAL | Age: 72
Discharge: HOME | End: 2022-10-05
Attending: ORTHOPAEDIC SURGERY
Payer: MEDICARE

## 2022-10-05 DIAGNOSIS — M16.0 PRIMARY OSTEOARTHRITIS OF BOTH HIPS: ICD-10-CM

## 2022-10-05 PROCEDURE — 77002 NEEDLE LOCALIZATION BY XRAY: CPT | Performed by: ORTHOPAEDIC SURGERY

## 2022-10-05 PROCEDURE — 20610 DRAIN/INJ JOINT/BURSA W/O US: CPT | Performed by: ORTHOPAEDIC SURGERY

## 2022-10-05 RX ORDER — BUPIVACAINE HYDROCHLORIDE 5 MG/ML
INJECTION, SOLUTION EPIDURAL; INTRACAUDAL
Status: COMPLETED
Start: 2022-10-05 | End: 2022-10-05

## 2022-10-05 RX ORDER — LIDOCAINE HYDROCHLORIDE 10 MG/ML
INJECTION, SOLUTION EPIDURAL; INFILTRATION; INTRACAUDAL; PERINEURAL
Status: COMPLETED
Start: 2022-10-05 | End: 2022-10-05

## 2022-10-05 RX ADMIN — LIDOCAINE HYDROCHLORIDE 10 ML: 10 INJECTION, SOLUTION EPIDURAL; INFILTRATION; INTRACAUDAL; PERINEURAL at 09:25:00

## 2022-10-05 RX ADMIN — BUPIVACAINE HYDROCHLORIDE 5 ML: 5 INJECTION, SOLUTION EPIDURAL; INTRACAUDAL at 09:22:00

## 2022-10-05 NOTE — TELEPHONE ENCOUNTER
S/w Dr Pj Vargas and he confirmed orders below 3ml of 0.5% marcaine, 2ml of 1% lidocaine, and 1ml of Kenalog 40 into each hip. Radiology notified.

## 2022-10-06 ENCOUNTER — TELEPHONE (OUTPATIENT)
Dept: INTERNAL MEDICINE CLINIC | Facility: CLINIC | Age: 72
End: 2022-10-06

## 2022-10-06 NOTE — TELEPHONE ENCOUNTER
Pt calls c/o watery eyes and chest pain, states she has contracted covid a second time and asls for repeat Paxlovid Rx now. Per chart, covid positive on 9/24/22 and prescribed Paxlovid by Trae. Current + status likely the resolving covid episode from 12 days ago. Dr Pastor Johnson out of the office unlil late next week. Returned call to patient but was unable to reach her. Left vmail message regarding inability to prescribe Paxlovid again for same episode and + test result can occur for up to 90 days after initial + test result. Advised on home/ self-care options for lingering symptoms. Given patient has cardiac hx, advised pt to call the office back or go to Immediate care for chest pain evaluation today. If chest pain severe or worsening-- go to the ER.

## 2022-10-06 NOTE — TELEPHONE ENCOUNTER
Pt is calling stating she was positive for covid 2 weeks ago. Saturday tested positive for covid again. Pt states she is having watery eyes and some chest pain. pt states she was prescribe paxlovid and what to know if doctor can prescribe medication.       Please call and advise

## 2022-10-11 ENCOUNTER — TELEPHONE (OUTPATIENT)
Dept: UROLOGY | Facility: HOSPITAL | Age: 72
End: 2022-10-11

## 2022-10-11 ENCOUNTER — TELEPHONE (OUTPATIENT)
Dept: UROLOGY | Facility: CLINIC | Age: 72
End: 2022-10-11

## 2022-10-11 DIAGNOSIS — N39.41 URGE INCONTINENCE: Primary | ICD-10-CM

## 2022-10-11 DIAGNOSIS — N32.81 DETRUSOR INSTABILITY: ICD-10-CM

## 2022-10-11 NOTE — TELEPHONE ENCOUNTER
Returned patient message left on nurses line. Patient wanted to confirm her appointment 10/12/2022 for Botox. Informed patient that her appointment for Botox is not scheduled for that date and will follow-up for the correct date for her appointment . Patient verbalized understanding. Patient encouraged to call with any questions or concerns.

## 2022-10-11 NOTE — TELEPHONE ENCOUNTER
Called patient and instructed her that a urine culture will need to be obtained prior to scheduled procedure on 10/17/2022. TORMIKE Lima Givens Urine culture future. Will follow. Patient verbalized understanding. Encouraged to call with questions or concerns.

## 2022-10-11 NOTE — TELEPHONE ENCOUNTER
TC made to [facility]. No answer, detailed message left on voicemail. Patient  Informed Botox appointment is scheduled for 10/17/2022 08:15 am at [facility]. Office number provided [phone]. Patient encouraged to call with any questions or concerns.

## 2022-10-11 NOTE — TELEPHONE ENCOUNTER
TC to pt re orders  Scheduled for botox next week   Needs ucx prior to botox, pt informed   All questions answered   Will follow results

## 2022-10-12 NOTE — PAT NURSING NOTE
Per pt had Covid infection Sept 17. And now she doesn't have any symptoms of cough cold,fatigue or any loss of smell/taste.

## 2022-10-13 RX ORDER — MONTELUKAST SODIUM 10 MG/1
TABLET ORAL
Qty: 90 TABLET | Refills: 0 | Status: SHIPPED | OUTPATIENT
Start: 2022-10-13

## 2022-10-14 ENCOUNTER — TELEPHONE (OUTPATIENT)
Dept: UROLOGY | Facility: HOSPITAL | Age: 72
End: 2022-10-14

## 2022-10-14 NOTE — TELEPHONE ENCOUNTER
Patient called. Patient requests fax from her cardiologist be sent to our office. The fax from the cardiologist will include directions to hold ASA, and Eliquis two days before procedure. Alanna is scheduled for Botox 10/17/2022. Fax number provided to patient (. Patient encouraged to call with any questions or concerns.

## 2022-10-14 NOTE — TELEPHONE ENCOUNTER
Called patient. Patient notifying we received fax from [address] [place] [address]. Fax included directions to hold Eliquis and ASA 2 days before the Botox procedure 10/17/2022. Patient verbalized understanding. Patient encouraged to call with any questions or concerns.

## 2022-10-15 ENCOUNTER — LAB ENCOUNTER (OUTPATIENT)
Dept: LAB | Facility: HOSPITAL | Age: 72
End: 2022-10-15
Attending: OBSTETRICS & GYNECOLOGY
Payer: MEDICARE

## 2022-10-17 ENCOUNTER — HOSPITAL ENCOUNTER (OUTPATIENT)
Facility: HOSPITAL | Age: 72
Setting detail: HOSPITAL OUTPATIENT SURGERY
Discharge: HOME | End: 2022-10-17
Attending: OBSTETRICS & GYNECOLOGY | Admitting: OBSTETRICS & GYNECOLOGY
Payer: MEDICARE

## 2022-10-17 ENCOUNTER — OFFICE VISIT (OUTPATIENT)
Dept: INTERNAL MEDICINE CLINIC | Facility: CLINIC | Age: 72
End: 2022-10-17
Payer: COMMERCIAL

## 2022-10-17 ENCOUNTER — ANESTHESIA EVENT (OUTPATIENT)
Dept: SURGERY | Facility: HOSPITAL | Age: 72
End: 2022-10-17
Payer: MEDICARE

## 2022-10-17 ENCOUNTER — ANESTHESIA (OUTPATIENT)
Dept: SURGERY | Facility: HOSPITAL | Age: 72
End: 2022-10-17
Payer: MEDICARE

## 2022-10-17 VITALS
OXYGEN SATURATION: 97 % | BODY MASS INDEX: 37 KG/M2 | HEART RATE: 78 BPM | SYSTOLIC BLOOD PRESSURE: 138 MMHG | DIASTOLIC BLOOD PRESSURE: 84 MMHG | TEMPERATURE: 98 F | WEIGHT: 252 LBS

## 2022-10-17 VITALS
OXYGEN SATURATION: 95 % | HEIGHT: 69 IN | BODY MASS INDEX: 37.33 KG/M2 | TEMPERATURE: 98 F | DIASTOLIC BLOOD PRESSURE: 87 MMHG | RESPIRATION RATE: 18 BRPM | WEIGHT: 252 LBS | SYSTOLIC BLOOD PRESSURE: 132 MMHG | HEART RATE: 70 BPM

## 2022-10-17 DIAGNOSIS — E11.9 TYPE 2 DIABETES MELLITUS WITHOUT COMPLICATION, WITHOUT LONG-TERM CURRENT USE OF INSULIN (HCC): ICD-10-CM

## 2022-10-17 DIAGNOSIS — G89.29 CHRONIC BILATERAL LOW BACK PAIN WITH BILATERAL SCIATICA: ICD-10-CM

## 2022-10-17 DIAGNOSIS — M54.41 CHRONIC BILATERAL LOW BACK PAIN WITH BILATERAL SCIATICA: ICD-10-CM

## 2022-10-17 DIAGNOSIS — I25.10 CORONARY ARTERIOSCLEROSIS: ICD-10-CM

## 2022-10-17 DIAGNOSIS — Z01.818 PREOP TESTING: Primary | ICD-10-CM

## 2022-10-17 DIAGNOSIS — R53.82 CHRONIC FATIGUE: Primary | ICD-10-CM

## 2022-10-17 DIAGNOSIS — Z23 NEED FOR VACCINATION: ICD-10-CM

## 2022-10-17 DIAGNOSIS — M54.42 CHRONIC BILATERAL LOW BACK PAIN WITH BILATERAL SCIATICA: ICD-10-CM

## 2022-10-17 DIAGNOSIS — E78.5 DYSLIPIDEMIA: ICD-10-CM

## 2022-10-17 DIAGNOSIS — I48.0 PAROXYSMAL ATRIAL FIBRILLATION (HCC): ICD-10-CM

## 2022-10-17 DIAGNOSIS — G47.33 OSA (OBSTRUCTIVE SLEEP APNEA): ICD-10-CM

## 2022-10-17 DIAGNOSIS — I10 BENIGN ESSENTIAL HTN: ICD-10-CM

## 2022-10-17 LAB — GLUCOSE BLDC GLUCOMTR-MCNC: 98 MG/DL (ref 70–99)

## 2022-10-17 PROCEDURE — 90662 IIV NO PRSV INCREASED AG IM: CPT | Performed by: FAMILY MEDICINE

## 2022-10-17 PROCEDURE — 3E0K8GC INTRODUCTION OF OTHER THERAPEUTIC SUBSTANCE INTO GENITOURINARY TRACT, VIA NATURAL OR ARTIFICIAL OPENING ENDOSCOPIC: ICD-10-PCS | Performed by: OBSTETRICS & GYNECOLOGY

## 2022-10-17 PROCEDURE — 3079F DIAST BP 80-89 MM HG: CPT | Performed by: FAMILY MEDICINE

## 2022-10-17 PROCEDURE — 99214 OFFICE O/P EST MOD 30 MIN: CPT | Performed by: FAMILY MEDICINE

## 2022-10-17 PROCEDURE — 3075F SYST BP GE 130 - 139MM HG: CPT | Performed by: FAMILY MEDICINE

## 2022-10-17 PROCEDURE — 82962 GLUCOSE BLOOD TEST: CPT

## 2022-10-17 RX ORDER — SODIUM CHLORIDE, SODIUM LACTATE, POTASSIUM CHLORIDE, CALCIUM CHLORIDE 600; 310; 30; 20 MG/100ML; MG/100ML; MG/100ML; MG/100ML
INJECTION, SOLUTION INTRAVENOUS CONTINUOUS
Status: DISCONTINUED | OUTPATIENT
Start: 2022-10-17 | End: 2022-10-17

## 2022-10-17 RX ORDER — ONDANSETRON 2 MG/ML
4 INJECTION INTRAMUSCULAR; INTRAVENOUS EVERY 6 HOURS PRN
Status: DISCONTINUED | OUTPATIENT
Start: 2022-10-17 | End: 2022-10-17

## 2022-10-17 RX ORDER — DEXTROSE MONOHYDRATE 25 G/50ML
50 INJECTION, SOLUTION INTRAVENOUS
Status: DISCONTINUED | OUTPATIENT
Start: 2022-10-17 | End: 2022-10-17 | Stop reason: HOSPADM

## 2022-10-17 RX ORDER — FAMOTIDINE 20 MG/1
20 TABLET, FILM COATED ORAL ONCE
Status: COMPLETED | OUTPATIENT
Start: 2022-10-17 | End: 2022-10-17

## 2022-10-17 RX ORDER — ACETAMINOPHEN 500 MG
1000 TABLET ORAL ONCE
Status: COMPLETED | OUTPATIENT
Start: 2022-10-17 | End: 2022-10-17

## 2022-10-17 RX ORDER — MORPHINE SULFATE 10 MG/ML
6 INJECTION, SOLUTION INTRAMUSCULAR; INTRAVENOUS EVERY 10 MIN PRN
Status: DISCONTINUED | OUTPATIENT
Start: 2022-10-17 | End: 2022-10-17

## 2022-10-17 RX ORDER — NICOTINE POLACRILEX 4 MG
30 LOZENGE BUCCAL
Status: DISCONTINUED | OUTPATIENT
Start: 2022-10-17 | End: 2022-10-17 | Stop reason: HOSPADM

## 2022-10-17 RX ORDER — HYDROMORPHONE HYDROCHLORIDE 1 MG/ML
0.2 INJECTION, SOLUTION INTRAMUSCULAR; INTRAVENOUS; SUBCUTANEOUS EVERY 5 MIN PRN
Status: DISCONTINUED | OUTPATIENT
Start: 2022-10-17 | End: 2022-10-17

## 2022-10-17 RX ORDER — MORPHINE SULFATE 4 MG/ML
4 INJECTION, SOLUTION INTRAMUSCULAR; INTRAVENOUS EVERY 10 MIN PRN
Status: DISCONTINUED | OUTPATIENT
Start: 2022-10-17 | End: 2022-10-17

## 2022-10-17 RX ORDER — LIDOCAINE HYDROCHLORIDE 20 MG/ML
JELLY TOPICAL AS NEEDED
Status: DISCONTINUED | OUTPATIENT
Start: 2022-10-17 | End: 2022-10-17 | Stop reason: HOSPADM

## 2022-10-17 RX ORDER — LIDOCAINE HYDROCHLORIDE 10 MG/ML
INJECTION, SOLUTION EPIDURAL; INFILTRATION; INTRACAUDAL; PERINEURAL AS NEEDED
Status: DISCONTINUED | OUTPATIENT
Start: 2022-10-17 | End: 2022-10-17 | Stop reason: SURG

## 2022-10-17 RX ORDER — HYDROMORPHONE HYDROCHLORIDE 1 MG/ML
0.6 INJECTION, SOLUTION INTRAMUSCULAR; INTRAVENOUS; SUBCUTANEOUS EVERY 5 MIN PRN
Status: DISCONTINUED | OUTPATIENT
Start: 2022-10-17 | End: 2022-10-17

## 2022-10-17 RX ORDER — NICOTINE POLACRILEX 4 MG
15 LOZENGE BUCCAL
Status: DISCONTINUED | OUTPATIENT
Start: 2022-10-17 | End: 2022-10-17 | Stop reason: HOSPADM

## 2022-10-17 RX ORDER — CEFAZOLIN SODIUM/WATER 2 G/20 ML
2 SYRINGE (ML) INTRAVENOUS ONCE
Status: COMPLETED | OUTPATIENT
Start: 2022-10-17 | End: 2022-10-17

## 2022-10-17 RX ORDER — NALOXONE HYDROCHLORIDE 0.4 MG/ML
80 INJECTION, SOLUTION INTRAMUSCULAR; INTRAVENOUS; SUBCUTANEOUS AS NEEDED
Status: DISCONTINUED | OUTPATIENT
Start: 2022-10-17 | End: 2022-10-17

## 2022-10-17 RX ORDER — HYDROMORPHONE HYDROCHLORIDE 1 MG/ML
0.4 INJECTION, SOLUTION INTRAMUSCULAR; INTRAVENOUS; SUBCUTANEOUS EVERY 5 MIN PRN
Status: DISCONTINUED | OUTPATIENT
Start: 2022-10-17 | End: 2022-10-17

## 2022-10-17 RX ORDER — METOCLOPRAMIDE 10 MG/1
10 TABLET ORAL ONCE
Status: COMPLETED | OUTPATIENT
Start: 2022-10-17 | End: 2022-10-17

## 2022-10-17 RX ORDER — MORPHINE SULFATE 4 MG/ML
2 INJECTION, SOLUTION INTRAMUSCULAR; INTRAVENOUS EVERY 10 MIN PRN
Status: DISCONTINUED | OUTPATIENT
Start: 2022-10-17 | End: 2022-10-17

## 2022-10-17 RX ADMIN — LIDOCAINE HYDROCHLORIDE 50 MG: 10 INJECTION, SOLUTION EPIDURAL; INFILTRATION; INTRACAUDAL; PERINEURAL at 09:02:00

## 2022-10-17 RX ADMIN — SODIUM CHLORIDE, SODIUM LACTATE, POTASSIUM CHLORIDE, CALCIUM CHLORIDE: 600; 310; 30; 20 INJECTION, SOLUTION INTRAVENOUS at 09:18:00

## 2022-10-17 RX ADMIN — CEFAZOLIN SODIUM/WATER 2 G: 2 G/20 ML SYRINGE (ML) INTRAVENOUS at 09:04:00

## 2022-10-17 NOTE — OPERATIVE REPORT
Pre Op: urinary urgency, urinary frequency, Bladder hypertonicity, UUI  Post op: same    Procedure: cystoscopy with bladder Botox injection (100 Units)  Surgeon: Mindi Moody DO    EBL: 5cc  Anesthesia: MAC  UOP: 75OK    No complications  No specimen    Findings: No evidence of bladder, ureteral, or urethral injury. Hemostasis upon completion. PROCEDURE:  The patient was taken to the operating room, with IV running after informed consent was obtained. She was placed in the supine position and induced under MAC anesthesia. The patient was then placed in the dorsal lithotomy position and prepped and draped in the usual sterile fashion. Lidocaine urojet injected. Bladder emptied. Cystoscopy was performed. Normal appearing bladder was noted. 100 Units of Botox was injected along the posterior wall of the bladder in 1/2cc injections 20 times using cystoscopic needle with approximate depth of 3mm without complication. There was no evidence of bladder, ureteral, or urethral injury noted on cystoscopy. No active bleeding was noted. Fluid was drained from the bladder. remaining lidocaine urojet was injected into urethra. The patient was then removed from the dorsal lithotomy position, awakened and extubated and transferred from the operating room to the recovery room in stable condition, having tolerated the procedure well. Sponge, lap, & needle counts were correct.

## 2022-10-17 NOTE — H&P
68 y/o female with UUI for surgical mgmt  She has tried and failed multiple treatment modalities for these symptoms, including PTNS and several meds. Pre operative clearance with Dr Sri Galvan, pt seen & examined without changes. Thorough discussion of surgical risks, benefits, and alternatives including, but not limited to bleeding/clots, infection, injury to nearby organs (urethra, bladder, ureters, bowel, blood vessels),de paul UI, worsening UI, recurrence, voiding dysfunction, and pain. Discussed pain mgmt and potential need for narcotics. Plan to proceed with cystoscopy with bladder botox injection as consented  All questions answered.    Jose Angel Husain  [phone]

## 2022-10-18 ENCOUNTER — TELEPHONE (OUTPATIENT)
Dept: UROLOGY | Facility: CLINIC | Age: 72
End: 2022-10-18

## 2022-10-19 ENCOUNTER — HOSPITAL ENCOUNTER (OUTPATIENT)
Dept: GENERAL RADIOLOGY | Facility: HOSPITAL | Age: 72
Discharge: HOME | End: 2022-10-19
Attending: ORTHOPAEDIC SURGERY
Payer: MEDICARE

## 2022-10-19 DIAGNOSIS — M16.0 PRIMARY OSTEOARTHRITIS OF BOTH HIPS: ICD-10-CM

## 2022-10-19 PROCEDURE — 20610 DRAIN/INJ JOINT/BURSA W/O US: CPT | Performed by: ORTHOPAEDIC SURGERY

## 2022-10-19 PROCEDURE — 77002 NEEDLE LOCALIZATION BY XRAY: CPT | Performed by: ORTHOPAEDIC SURGERY

## 2022-10-19 RX ORDER — LIDOCAINE HYDROCHLORIDE 10 MG/ML
INJECTION, SOLUTION EPIDURAL; INFILTRATION; INTRACAUDAL; PERINEURAL
Status: COMPLETED
Start: 2022-10-19 | End: 2022-10-19

## 2022-10-19 RX ORDER — BUPIVACAINE HYDROCHLORIDE 5 MG/ML
INJECTION, SOLUTION EPIDURAL; INTRACAUDAL
Status: COMPLETED
Start: 2022-10-19 | End: 2022-10-19

## 2022-10-19 RX ORDER — TRIAMCINOLONE ACETONIDE 40 MG/ML
INJECTION, SUSPENSION INTRA-ARTICULAR; INTRAMUSCULAR
Status: COMPLETED
Start: 2022-10-19 | End: 2022-10-19

## 2022-10-24 ENCOUNTER — LAB ENCOUNTER (OUTPATIENT)
Dept: LAB | Facility: REFERENCE LAB | Age: 72
End: 2022-10-24
Attending: FAMILY MEDICINE
Payer: MEDICARE

## 2022-10-24 DIAGNOSIS — E11.9 TYPE 2 DIABETES MELLITUS WITHOUT COMPLICATION, WITHOUT LONG-TERM CURRENT USE OF INSULIN (HCC): ICD-10-CM

## 2022-10-24 DIAGNOSIS — R53.82 CHRONIC FATIGUE: ICD-10-CM

## 2022-10-24 LAB
ALBUMIN SERPL-MCNC: 4.1 G/DL (ref 3.4–5)
ALBUMIN/GLOB SERPL: 1.1 {RATIO} (ref 1–2)
ALP LIVER SERPL-CCNC: 66 U/L
ALT SERPL-CCNC: 18 U/L
ANION GAP SERPL CALC-SCNC: 5 MMOL/L (ref 0–18)
AST SERPL-CCNC: 7 U/L (ref 15–37)
BASOPHILS # BLD AUTO: 0.02 X10(3) UL (ref 0–0.2)
BASOPHILS NFR BLD AUTO: 0.2 %
BILIRUB SERPL-MCNC: 0.5 MG/DL (ref 0.1–2)
BUN BLD-MCNC: 24 MG/DL (ref 7–18)
BUN/CREAT SERPL: 21.4 (ref 10–20)
CALCIUM BLD-MCNC: 10.5 MG/DL (ref 8.5–10.1)
CHLORIDE SERPL-SCNC: 105 MMOL/L (ref 98–112)
CO2 SERPL-SCNC: 29 MMOL/L (ref 21–32)
CREAT BLD-MCNC: 1.12 MG/DL
CREAT UR-SCNC: 170 MG/DL
DEPRECATED RDW RBC AUTO: 45 FL (ref 35.1–46.3)
EOSINOPHIL # BLD AUTO: 0.06 X10(3) UL (ref 0–0.7)
EOSINOPHIL NFR BLD AUTO: 0.5 %
ERYTHROCYTE [DISTWIDTH] IN BLOOD BY AUTOMATED COUNT: 12.5 % (ref 11–15)
EST. AVERAGE GLUCOSE BLD GHB EST-MCNC: 126 MG/DL (ref 68–126)
FASTING STATUS PATIENT QL REPORTED: NO
GFR SERPLBLD BASED ON 1.73 SQ M-ARVRAT: 52 ML/MIN/1.73M2 (ref 60–?)
GLOBULIN PLAS-MCNC: 3.9 G/DL (ref 2.8–4.4)
GLUCOSE BLD-MCNC: 96 MG/DL (ref 70–99)
HBA1C MFR BLD: 6 % (ref ?–5.7)
HCT VFR BLD AUTO: 39.1 %
HGB BLD-MCNC: 12.6 G/DL
IMM GRANULOCYTES # BLD AUTO: 0.05 X10(3) UL (ref 0–1)
IMM GRANULOCYTES NFR BLD: 0.4 %
LYMPHOCYTES # BLD AUTO: 3.85 X10(3) UL (ref 1–4)
LYMPHOCYTES NFR BLD AUTO: 30 %
MCH RBC QN AUTO: 32 PG (ref 26–34)
MCHC RBC AUTO-ENTMCNC: 32.2 G/DL (ref 31–37)
MCV RBC AUTO: 99.2 FL
MICROALBUMIN UR-MCNC: 5.75 MG/DL
MICROALBUMIN/CREAT 24H UR-RTO: 33.8 UG/MG (ref ?–30)
MONOCYTES # BLD AUTO: 1.06 X10(3) UL (ref 0.1–1)
MONOCYTES NFR BLD AUTO: 8.3 %
NEUTROPHILS # BLD AUTO: 7.78 X10 (3) UL (ref 1.5–7.7)
NEUTROPHILS # BLD AUTO: 7.78 X10(3) UL (ref 1.5–7.7)
NEUTROPHILS NFR BLD AUTO: 60.6 %
OSMOLALITY SERPL CALC.SUM OF ELEC: 292 MOSM/KG (ref 275–295)
PLATELET # BLD AUTO: 340 10(3)UL (ref 150–450)
POTASSIUM SERPL-SCNC: 3.9 MMOL/L (ref 3.5–5.1)
PROT SERPL-MCNC: 8 G/DL (ref 6.4–8.2)
RBC # BLD AUTO: 3.94 X10(6)UL
SODIUM SERPL-SCNC: 139 MMOL/L (ref 136–145)
VIT D+METAB SERPL-MCNC: 21.5 NG/ML (ref 30–100)
WBC # BLD AUTO: 12.8 X10(3) UL (ref 4–11)

## 2022-10-24 PROCEDURE — 83036 HEMOGLOBIN GLYCOSYLATED A1C: CPT

## 2022-10-24 PROCEDURE — 82043 UR ALBUMIN QUANTITATIVE: CPT

## 2022-10-24 PROCEDURE — 85025 COMPLETE CBC W/AUTO DIFF WBC: CPT | Performed by: FAMILY MEDICINE

## 2022-10-24 PROCEDURE — 3044F HG A1C LEVEL LT 7.0%: CPT | Performed by: FAMILY MEDICINE

## 2022-10-24 PROCEDURE — 80053 COMPREHEN METABOLIC PANEL: CPT

## 2022-10-24 PROCEDURE — 82306 VITAMIN D 25 HYDROXY: CPT

## 2022-10-24 PROCEDURE — 36415 COLL VENOUS BLD VENIPUNCTURE: CPT

## 2022-10-24 PROCEDURE — 82570 ASSAY OF URINE CREATININE: CPT

## 2022-10-27 ENCOUNTER — TELEMEDICINE (OUTPATIENT)
Dept: NEUROLOGY | Facility: CLINIC | Age: 72
End: 2022-10-27
Payer: COMMERCIAL

## 2022-10-27 ENCOUNTER — TELEPHONE (OUTPATIENT)
Dept: NEUROLOGY | Facility: CLINIC | Age: 72
End: 2022-10-27

## 2022-10-27 DIAGNOSIS — R41.3 MEMORY CHANGES: Primary | ICD-10-CM

## 2022-10-27 DIAGNOSIS — M79.609 MUSCULOSKELETAL PAIN OF EXTREMITY: ICD-10-CM

## 2022-10-27 PROCEDURE — 99213 OFFICE O/P EST LOW 20 MIN: CPT | Performed by: OTHER

## 2022-10-27 NOTE — TELEPHONE ENCOUNTER
----- Message from Bruce Calderón DO sent at 10/27/2022 11:23 AM CDT -----  Regarding: follow up  Hi  She is moving to [address] [place] [address] in Jan. She needs follow up after her neuropsych testing she will have on 11/1/22 Can we:  1. Call the psychologist and tell them to expedite the report  2.  Have her seen in early Dec?  Thanks  [place]

## 2022-10-28 NOTE — TELEPHONE ENCOUNTER
Spoke to patient who states she has an appointment on 10/31/22 for her Neuropsych testing. She will be seeing Dr. Fay Barrett  [facility][note 3][address]  [phone]    Spoke to Dr. Fay Barrett office who states patient does not have an appointment to complete her testing yet. Her current appointment is her consultation. Once consultation is completed patient will schedule her testing. Patient was transferred to [facility] to schedule appointment with Dr. Alex Freeman for December.

## 2022-10-31 RX ORDER — EMPAGLIFLOZIN 25 MG/1
1 TABLET, FILM COATED ORAL
Qty: 90 TABLET | Refills: 0 | Status: SHIPPED | OUTPATIENT
Start: 2022-10-31

## 2022-11-11 ENCOUNTER — TELEPHONE (OUTPATIENT)
Dept: INTERNAL MEDICINE CLINIC | Facility: CLINIC | Age: 72
End: 2022-11-11

## 2022-11-11 ENCOUNTER — OFFICE VISIT (OUTPATIENT)
Dept: UROLOGY | Facility: HOSPITAL | Age: 72
End: 2022-11-11
Attending: STUDENT IN AN ORGANIZED HEALTH CARE EDUCATION/TRAINING PROGRAM
Payer: MEDICARE

## 2022-11-11 VITALS — HEIGHT: 70 IN | BODY MASS INDEX: 35.65 KG/M2 | RESPIRATION RATE: 18 BRPM | WEIGHT: 249 LBS

## 2022-11-11 DIAGNOSIS — N81.84 PELVIC MUSCLE WASTING: ICD-10-CM

## 2022-11-11 DIAGNOSIS — N32.81 DETRUSOR INSTABILITY: Primary | ICD-10-CM

## 2022-11-11 DIAGNOSIS — R39.15 URINARY URGENCY: ICD-10-CM

## 2022-11-11 DIAGNOSIS — N39.41 URGE INCONTINENCE: ICD-10-CM

## 2022-11-11 LAB
BILIRUB UR QL: NEGATIVE
COLOR UR: YELLOW
CONTROL RUN WITHIN 24 HOURS?: YES
GLUCOSE UR-MCNC: >=500 MG/DL
KETONES UR-MCNC: NEGATIVE MG/DL
NITRITE UR QL STRIP.AUTO: NEGATIVE
NITRITE URINE: NEGATIVE
PH UR: 5 [PH] (ref 5–8)
PROT UR-MCNC: 100 MG/DL
RBC #/AREA URNS AUTO: >10 /HPF
SP GR UR STRIP: 1.03 (ref 1–1.03)
UROBILINOGEN UR STRIP-ACNC: 2
VIT C UR-MCNC: NEGATIVE MG/DL
WBC #/AREA URNS AUTO: >50 /HPF
WBC CLUMPS UR QL AUTO: PRESENT /HPF

## 2022-11-11 PROCEDURE — 87088 URINE BACTERIA CULTURE: CPT | Performed by: STUDENT IN AN ORGANIZED HEALTH CARE EDUCATION/TRAINING PROGRAM

## 2022-11-11 PROCEDURE — 51701 INSERT BLADDER CATHETER: CPT

## 2022-11-11 PROCEDURE — 87184 SC STD DISK METHOD PER PLATE: CPT | Performed by: STUDENT IN AN ORGANIZED HEALTH CARE EDUCATION/TRAINING PROGRAM

## 2022-11-11 PROCEDURE — 99212 OFFICE O/P EST SF 10 MIN: CPT

## 2022-11-11 PROCEDURE — 87186 SC STD MICRODIL/AGAR DIL: CPT | Performed by: STUDENT IN AN ORGANIZED HEALTH CARE EDUCATION/TRAINING PROGRAM

## 2022-11-11 PROCEDURE — 81001 URINALYSIS AUTO W/SCOPE: CPT | Performed by: STUDENT IN AN ORGANIZED HEALTH CARE EDUCATION/TRAINING PROGRAM

## 2022-11-11 PROCEDURE — 81002 URINALYSIS NONAUTO W/O SCOPE: CPT | Performed by: STUDENT IN AN ORGANIZED HEALTH CARE EDUCATION/TRAINING PROGRAM

## 2022-11-11 PROCEDURE — 87086 URINE CULTURE/COLONY COUNT: CPT | Performed by: STUDENT IN AN ORGANIZED HEALTH CARE EDUCATION/TRAINING PROGRAM

## 2022-11-14 ENCOUNTER — HOSPITAL ENCOUNTER (OUTPATIENT)
Dept: MAMMOGRAPHY | Facility: HOSPITAL | Age: 72
Discharge: HOME | End: 2022-11-14
Attending: INTERNAL MEDICINE
Payer: MEDICARE

## 2022-11-14 ENCOUNTER — TELEPHONE (OUTPATIENT)
Dept: UROLOGY | Facility: HOSPITAL | Age: 72
End: 2022-11-14

## 2022-11-14 DIAGNOSIS — Z12.31 SCREENING MAMMOGRAM FOR BREAST CANCER: ICD-10-CM

## 2022-11-14 PROCEDURE — 77067 SCR MAMMO BI INCL CAD: CPT | Performed by: INTERNAL MEDICINE

## 2022-11-14 PROCEDURE — 77063 BREAST TOMOSYNTHESIS BI: CPT | Performed by: INTERNAL MEDICINE

## 2022-11-14 NOTE — TELEPHONE ENCOUNTER
Telephone call to patient with test results. Detailed message left on patient voicemail. Ucx + > 100,000 CFU/ML Ecoli    Allergies reviewed    Discussed with, Cori NOVA Macrobid 100 mg BID X 7 days. Sent  antibiotics to pt's preferred pharmacy  Patient encouraged to call with any questions or concerns. Office number provided [phone].

## 2022-11-15 RX ORDER — FLUTICASONE PROPIONATE 50 MCG
SPRAY, SUSPENSION (ML) NASAL
Qty: 48 G | Refills: 0 | Status: SHIPPED | OUTPATIENT
Start: 2022-11-15

## 2022-11-22 ENCOUNTER — TELEPHONE (OUTPATIENT)
Dept: UROLOGY | Facility: HOSPITAL | Age: 72
End: 2022-11-22

## 2022-11-22 ENCOUNTER — TELEPHONE (OUTPATIENT)
Dept: INTERNAL MEDICINE CLINIC | Facility: CLINIC | Age: 72
End: 2022-11-22

## 2022-11-22 NOTE — TELEPHONE ENCOUNTER
Left message. nitrofurantoin monohydrate macro (MACROBID) 100 MG Oral Cap was sent by     pregabalin 300 MG Oral Cap was sent by Kaylah Tapia and have a year worth of refills from June. Per Paralee Bumpers we don't refill antibiotic, she needs to go to urgent care or walk-in clinic to leave another sample of urine. Paralee Bumpers won't be around due to the holiday to the read results.

## 2022-11-22 NOTE — TELEPHONE ENCOUNTER
TC from pt asking what time her appt is for tmrw. Informed pt she does not have an appt tmrw. Confirmed next appt is scheduled for 1/16/23 via the telephone with the PA. Pt verbalized understanding.

## 2022-11-23 ENCOUNTER — TELEPHONE (OUTPATIENT)
Dept: UROLOGY | Facility: HOSPITAL | Age: 72
End: 2022-11-23

## 2022-11-23 NOTE — TELEPHONE ENCOUNTER
TC from [facility] with c/o vaginal itching . Patient states she completed the antibiotic for the bladder infection and she is experiencing \"severe vaginal itching and burning\" . Pt denies discharge . Requesting patient come to office for vaginal swab. Pt appointment made for 12 noon.

## 2022-11-23 NOTE — TELEPHONE ENCOUNTER
Called patient at 12:20 and she is not going to make her appointment today and requesting appointment for Monday. Appointment made for patient to be seen in office on Monday.

## 2022-11-28 ENCOUNTER — NURSE ONLY (OUTPATIENT)
Dept: UROLOGY | Facility: HOSPITAL | Age: 72
End: 2022-11-28
Payer: MEDICARE

## 2022-11-28 VITALS
RESPIRATION RATE: 18 BRPM | DIASTOLIC BLOOD PRESSURE: 60 MMHG | SYSTOLIC BLOOD PRESSURE: 134 MMHG | TEMPERATURE: 99 F | BODY MASS INDEX: 35.65 KG/M2 | HEIGHT: 70 IN | WEIGHT: 249 LBS

## 2022-11-28 DIAGNOSIS — N39.41 URGE INCONTINENCE: ICD-10-CM

## 2022-11-28 DIAGNOSIS — N89.8 VAGINAL DISCHARGE: Primary | ICD-10-CM

## 2022-11-28 LAB
BLOOD URINE: NEGATIVE
CONTROL RUN WITHIN 24 HOURS?: YES
LEUKOCYTE ESTERASE URINE: NEGATIVE
NITRITE URINE: NEGATIVE
TRICHOMONAS SCREEN: NEGATIVE

## 2022-11-28 PROCEDURE — 81002 URINALYSIS NONAUTO W/O SCOPE: CPT

## 2022-11-28 PROCEDURE — 87205 SMEAR GRAM STAIN: CPT

## 2022-11-28 PROCEDURE — 87106 FUNGI IDENTIFICATION YEAST: CPT

## 2022-11-28 PROCEDURE — 51701 INSERT BLADDER CATHETER: CPT

## 2022-11-28 PROCEDURE — 87086 URINE CULTURE/COLONY COUNT: CPT

## 2022-11-28 PROCEDURE — 87808 TRICHOMONAS ASSAY W/OPTIC: CPT

## 2022-11-28 RX ORDER — FLUTICASONE PROPIONATE 50 MCG
SPRAY, SUSPENSION (ML) NASAL
Qty: 48 G | Refills: 0 | Status: SHIPPED | OUTPATIENT
Start: 2022-11-28

## 2022-11-29 ENCOUNTER — TELEPHONE (OUTPATIENT)
Dept: ORTHOPEDICS CLINIC | Facility: CLINIC | Age: 72
End: 2022-11-29

## 2022-11-29 ENCOUNTER — TELEPHONE (OUTPATIENT)
Dept: INTERNAL MEDICINE CLINIC | Facility: CLINIC | Age: 72
End: 2022-11-29

## 2022-11-29 RX ORDER — OMEPRAZOLE 40 MG/1
40 CAPSULE, DELAYED RELEASE ORAL DAILY
Qty: 90 CAPSULE | Refills: 0 | Status: SHIPPED | OUTPATIENT
Start: 2022-11-29

## 2022-11-29 NOTE — TELEPHONE ENCOUNTER
S/w pt and she just wants some info on hip sx. Informed her when she is ready to schedule she should call to make appt to discuss sx prior to scheduling. Mailed hip replacement info to pt.

## 2022-11-29 NOTE — TELEPHONE ENCOUNTER
Per pt asking if information for anterior hip replacement can be mailed to her home.  Please advise 0 = independent

## 2022-11-30 ENCOUNTER — TELEPHONE (OUTPATIENT)
Dept: UROLOGY | Facility: HOSPITAL | Age: 72
End: 2022-11-30

## 2022-11-30 LAB
GENITAL VAGINOSIS SCREEN: NEGATIVE
TRICHOMONAS SCREEN: NEGATIVE

## 2022-11-30 RX ORDER — FLUCONAZOLE 150 MG/1
150 TABLET ORAL SEE ADMIN INSTRUCTIONS
Qty: 2 TABLET | Refills: 0 | Status: SHIPPED | OUTPATIENT
Start: 2022-11-30

## 2022-11-30 NOTE — TELEPHONE ENCOUNTER
Telephone call to patient with test results    Urine culture obtained on 11/28/2022 showed no growth. The vaginal swab obtained on 11/28/2022 showed 4+ Candida albicans    Allergies reviewed    Discussed with Cori NOVA Diflucan 150 mg po and repeat in 72 hours disp. #2.     Prescription sent  to pt's preferred pharmacy    All questions answered     Follow up as scheduled, sooner prn    Patient understands and agrees to plan

## 2022-12-05 RX ORDER — DILTIAZEM HYDROCHLORIDE 300 MG/1
300 CAPSULE, COATED, EXTENDED RELEASE ORAL DAILY
Qty: 30 CAPSULE | Refills: 2 | Status: SHIPPED | OUTPATIENT
Start: 2022-12-05

## 2022-12-05 RX ORDER — HYDROCHLOROTHIAZIDE 12.5 MG/1
12.5 CAPSULE, GELATIN COATED ORAL DAILY
Qty: 30 CAPSULE | Refills: 0 | Status: SHIPPED | OUTPATIENT
Start: 2022-12-05

## 2022-12-05 RX ORDER — ROSUVASTATIN CALCIUM 10 MG/1
10 TABLET, COATED ORAL NIGHTLY
Qty: 90 TABLET | Refills: 0 | Status: SHIPPED | OUTPATIENT
Start: 2022-12-05

## 2022-12-05 NOTE — TELEPHONE ENCOUNTER
Patient is requesting refills:    DILTIAZEM  MG Oral Capsule SR 24 Hr   hydroCHLOROthiazide 12.5 MG Oral Cap  ROSUVASTATIN 10 MG Oral Tab       Patient has refills for the following medication but pharmacy is telling her they don't have it.  Please call pharmacy for :    METFORMIN HCL 1000 MG Oral Tab

## 2022-12-08 ENCOUNTER — TELEPHONE (OUTPATIENT)
Dept: NEUROLOGY | Facility: CLINIC | Age: 72
End: 2022-12-08

## 2022-12-08 ENCOUNTER — TELEMEDICINE (OUTPATIENT)
Dept: NEUROLOGY | Facility: CLINIC | Age: 72
End: 2022-12-08
Payer: COMMERCIAL

## 2022-12-08 DIAGNOSIS — R41.3 MEMORY CHANGES: Primary | ICD-10-CM

## 2022-12-08 DIAGNOSIS — R90.89 ABNORMAL FINDING ON MRI OF BRAIN: ICD-10-CM

## 2022-12-08 DIAGNOSIS — R44.2 ALTERATION IN SENSORY PERCEPTION AS EVIDENCED BY ILLUSIONS: ICD-10-CM

## 2022-12-08 DIAGNOSIS — M79.2 NEUROPATHIC PAIN: ICD-10-CM

## 2022-12-08 DIAGNOSIS — R90.89 ABNORMAL FINDING ON MRI OF BRAIN: Primary | ICD-10-CM

## 2022-12-08 PROCEDURE — 99215 OFFICE O/P EST HI 40 MIN: CPT | Performed by: OTHER

## 2022-12-08 NOTE — TELEPHONE ENCOUNTER
Call patient. She states that she did get neuropsychological testing. She states that she was there for many hours. We will contact Dr. Juan Rojas office again. Also discussed results of her MRI of the brain. She will call ENT. Advised her that the clinic will call her tomorrow as well with contact information for her to schedule an appointment with ENT.     1. Abnormal finding on MRI of brain  - ENT - INTERNAL        Candelaria Or,  DO  [facility]

## 2022-12-08 NOTE — TELEPHONE ENCOUNTER
Hi, it says that they were supposed to see Dr. Devora Rothman. This is a different site than Dr. Pedro Pablo Weller.

## 2022-12-09 ENCOUNTER — TELEPHONE (OUTPATIENT)
Dept: INTERNAL MEDICINE CLINIC | Facility: CLINIC | Age: 72
End: 2022-12-09

## 2022-12-09 NOTE — TELEPHONE ENCOUNTER
Patient notified of results and verbalized understanding.      ----- Message from Yao Pradhan MD sent at 12/8/2022  4:58 PM CST -----  Please inform:    Addendum to mammogram has been made. There has been comparison between her previous mammogram as well as her most recent mammogram mammogram.  No negative change from comparison assessment. Recommending routine mammogram and clinical evaluation in 12 months.         Magaly Keller MD

## 2022-12-09 NOTE — TELEPHONE ENCOUNTER
Spoke to Ramila who states that she did have neuropsych testing done with [facility]. The report should be done by end of day Monday and they will fax it to our office.

## 2022-12-12 RX ORDER — FENOFIBRATE 200 MG/1
CAPSULE ORAL
Qty: 90 CAPSULE | Refills: 0 | Status: SHIPPED | OUTPATIENT
Start: 2022-12-12

## 2022-12-22 ENCOUNTER — OFFICE VISIT (OUTPATIENT)
Dept: OTOLARYNGOLOGY | Facility: CLINIC | Age: 72
End: 2022-12-22
Payer: COMMERCIAL

## 2022-12-22 DIAGNOSIS — J39.2 NASOPHARYNGEAL MASS: Primary | ICD-10-CM

## 2022-12-22 RX ORDER — LORATADINE 10 MG/1
10 TABLET ORAL DAILY
Qty: 30 TABLET | Refills: 3 | Status: SHIPPED | OUTPATIENT
Start: 2022-12-22

## 2022-12-23 ENCOUNTER — TELEPHONE (OUTPATIENT)
Dept: OTOLARYNGOLOGY | Facility: CLINIC | Age: 72
End: 2022-12-23

## 2022-12-30 ENCOUNTER — TELEPHONE (OUTPATIENT)
Dept: INTERNAL MEDICINE CLINIC | Facility: CLINIC | Age: 72
End: 2022-12-30

## 2022-12-30 RX ORDER — OMEPRAZOLE 40 MG/1
40 CAPSULE, DELAYED RELEASE ORAL DAILY
Qty: 60 CAPSULE | Refills: 0 | Status: SHIPPED | OUTPATIENT
Start: 2022-12-30 | End: 2022-12-30

## 2022-12-30 RX ORDER — OMEPRAZOLE 40 MG/1
40 CAPSULE, DELAYED RELEASE ORAL DAILY
Qty: 60 CAPSULE | Refills: 0 | Status: SHIPPED | OUTPATIENT
Start: 2022-12-30

## 2022-12-30 NOTE — TELEPHONE ENCOUNTER
Pt called requesting partial refills for Omeprazole 40 MG Oral Capsule Delayed Release    Pt has an appt for 02/22  Pt wants to be informed when this has been sent

## 2023-01-03 ENCOUNTER — TELEPHONE (OUTPATIENT)
Dept: INTERNAL MEDICINE CLINIC | Facility: CLINIC | Age: 73
End: 2023-01-03

## 2023-01-03 RX ORDER — OMEPRAZOLE 40 MG/1
CAPSULE, DELAYED RELEASE ORAL
Qty: 90 CAPSULE | Refills: 0 | Status: SHIPPED | OUTPATIENT
Start: 2023-01-03

## 2023-01-03 NOTE — TELEPHONE ENCOUNTER
Patient is complaining of headaches, nausea, dizziness, and she check her /70. Patient would like to speak to the RN today.

## 2023-01-03 NOTE — TELEPHONE ENCOUNTER
Spoke with patient today in regards to her blood pressure reading. Patient stated that she had her  take her blood pressure with a digital cuff. The reading came back at 142/70. Patient hasn't taken her blood pressure since. She can;t check it by herself since she handicap and needs a little help. Patient stated her  will come back on Friday to recheck her bp. Patient stated she has a light headache and no other symptoms. Advised patient to call us back if anything changes. Patient will update us on her blood pressure on Friday. Patient verbalized understanding.

## 2023-01-10 DIAGNOSIS — M79.2 NEUROPATHIC PAIN: ICD-10-CM

## 2023-01-10 NOTE — TELEPHONE ENCOUNTER
The patient is requesting a refill on: Pregabalin    Last OV: 10/27/22  Next OV: None  Last refilled: 6/7/22

## 2023-01-11 RX ORDER — PREGABALIN 300 MG/1
CAPSULE ORAL
Qty: 180 CAPSULE | Refills: 0 | Status: SHIPPED | OUTPATIENT
Start: 2023-01-11

## 2023-01-16 ENCOUNTER — VIRTUAL PHONE E/M (OUTPATIENT)
Dept: UROLOGY | Facility: HOSPITAL | Age: 73
End: 2023-01-16
Attending: STUDENT IN AN ORGANIZED HEALTH CARE EDUCATION/TRAINING PROGRAM
Payer: MEDICARE

## 2023-01-16 VITALS — BODY MASS INDEX: 35.65 KG/M2 | WEIGHT: 249 LBS | HEIGHT: 70 IN

## 2023-01-16 DIAGNOSIS — N81.84 PELVIC MUSCLE WASTING: ICD-10-CM

## 2023-01-16 DIAGNOSIS — R39.15 URINARY URGENCY: ICD-10-CM

## 2023-01-16 DIAGNOSIS — N39.41 URGE INCONTINENCE: Primary | ICD-10-CM

## 2023-01-16 DIAGNOSIS — N32.81 DETRUSOR INSTABILITY: ICD-10-CM

## 2023-01-16 DIAGNOSIS — N95.2 POSTMENOPAUSAL ATROPHIC VAGINITIS: ICD-10-CM

## 2023-01-16 DIAGNOSIS — R35.1 NOCTURIA: ICD-10-CM

## 2023-01-16 RX ORDER — ESTRADIOL 0.1 MG/G
CREAM VAGINAL
Qty: 42.5 G | Refills: 3 | Status: SHIPPED | OUTPATIENT
Start: 2023-01-16

## 2023-01-16 RX ORDER — TROSPIUM CHLORIDE ER 60 MG/1
60 CAPSULE ORAL DAILY
Qty: 30 CAPSULE | Refills: 11 | Status: SHIPPED | OUTPATIENT
Start: 2023-01-16

## 2023-01-17 ENCOUNTER — NURSE ONLY (OUTPATIENT)
Dept: UROLOGY | Facility: HOSPITAL | Age: 73
End: 2023-01-17
Attending: PHYSICIAN ASSISTANT
Payer: MEDICARE

## 2023-01-17 VITALS — BODY MASS INDEX: 35.65 KG/M2 | HEIGHT: 70 IN | WEIGHT: 249 LBS | RESPIRATION RATE: 18 BRPM

## 2023-01-17 DIAGNOSIS — N89.8 VAGINAL DISCHARGE: ICD-10-CM

## 2023-01-17 DIAGNOSIS — R30.0 DYSURIA: Primary | ICD-10-CM

## 2023-01-17 LAB
BILIRUB UR QL: NEGATIVE
CLARITY UR: CLEAR
COLOR UR: YELLOW
CONTROL RUN WITHIN 24 HOURS?: YES
GLUCOSE UR-MCNC: >=1000 MG/DL
HGB UR QL STRIP.AUTO: NEGATIVE
LEUKOCYTE ESTERASE UR QL STRIP.AUTO: NEGATIVE
LEUKOCYTE ESTERASE URINE: NEGATIVE
NITRITE UR QL STRIP.AUTO: NEGATIVE
NITRITE URINE: NEGATIVE
PH UR: 5 [PH] (ref 5–8)
PROT UR-MCNC: NEGATIVE MG/DL
SP GR UR STRIP: >=1.03 (ref 1–1.03)
UROBILINOGEN UR STRIP-ACNC: 0.2

## 2023-01-17 PROCEDURE — 81003 URINALYSIS AUTO W/O SCOPE: CPT

## 2023-01-17 PROCEDURE — 87808 TRICHOMONAS ASSAY W/OPTIC: CPT

## 2023-01-17 PROCEDURE — 87086 URINE CULTURE/COLONY COUNT: CPT

## 2023-01-17 PROCEDURE — 51701 INSERT BLADDER CATHETER: CPT

## 2023-01-17 PROCEDURE — 87205 SMEAR GRAM STAIN: CPT

## 2023-01-17 PROCEDURE — 81002 URINALYSIS NONAUTO W/O SCOPE: CPT

## 2023-01-17 PROCEDURE — 87106 FUNGI IDENTIFICATION YEAST: CPT

## 2023-01-17 NOTE — PROGRESS NOTES
S:   Patient presents with signs and symptoms of UTI. Patient reports dysuria, and vaginal itching  Symptoms started 2 weeks ago   She is to begin using vaginal estrogen cream twice weekly    O:    01/17/23  1217   Resp: 18     Last positive culture was 11/11/2022, treated with Macrobid   : Sterile technique used to prep urethra and collect specimen. Sent for urine culture. Urine is clear without odor   Urine culture sent.-will follow  Vaginal swab obtained for vaginal discharge  A:   Dx No diagnosis found.       P:   Urine culture sent and vaginal swab sent , will follow results     Encouraged PO hydration, AZO prn for discomfort  All questions answered    Has appointment scheduled in 6 weeks   Follow up as scheduled, sooner prn  She understands and agrees to plan

## 2023-01-19 LAB
GENITAL VAGINOSIS SCREEN: NEGATIVE
TRICHOMONAS SCREEN: NEGATIVE

## 2023-01-20 ENCOUNTER — TELEPHONE (OUTPATIENT)
Dept: UROLOGY | Facility: HOSPITAL | Age: 73
End: 2023-01-20

## 2023-01-20 DIAGNOSIS — N89.8 VAGINAL DISCHARGE: Primary | ICD-10-CM

## 2023-01-20 RX ORDER — FLUCONAZOLE 150 MG/1
150 TABLET ORAL
Qty: 2 TABLET | Refills: 0 | Status: SHIPPED | OUTPATIENT
Start: 2023-01-20

## 2023-01-24 RX ORDER — MONTELUKAST SODIUM 10 MG/1
TABLET ORAL
Qty: 90 TABLET | Refills: 0 | Status: SHIPPED | OUTPATIENT
Start: 2023-01-24

## 2023-01-26 ENCOUNTER — TELEPHONE (OUTPATIENT)
Dept: ORTHOPEDICS CLINIC | Facility: CLINIC | Age: 73
End: 2023-01-26

## 2023-01-26 DIAGNOSIS — M16.0 PRIMARY OSTEOARTHRITIS OF BOTH HIPS: Primary | ICD-10-CM

## 2023-01-26 DIAGNOSIS — M25.361 INSTABILITY OF KNEE JOINT, RIGHT: ICD-10-CM

## 2023-01-26 DIAGNOSIS — M25.362 INSTABILITY OF LEFT KNEE JOINT: ICD-10-CM

## 2023-01-26 NOTE — TELEPHONE ENCOUNTER
Per pt needs letter stating she would benefit from using wheelchair, letter would be for insurance. Pt states she uses cane and walker but becomes tired. Please advise thank you.

## 2023-01-31 NOTE — TELEPHONE ENCOUNTER
S/w pt and she would like the order mailed to her home. Address confirmed. She already s/w her insurance and they confirmed they will cover the [facility] and will send her info on where she can go. Order mailed.

## 2023-02-09 ENCOUNTER — APPOINTMENT (OUTPATIENT)
Dept: GENERAL RADIOLOGY | Facility: HOSPITAL | Age: 73
End: 2023-02-09
Attending: STUDENT IN AN ORGANIZED HEALTH CARE EDUCATION/TRAINING PROGRAM
Payer: MEDICARE

## 2023-02-09 ENCOUNTER — HOSPITAL ENCOUNTER (EMERGENCY)
Facility: HOSPITAL | Age: 73
Discharge: HOME | End: 2023-02-09
Attending: STUDENT IN AN ORGANIZED HEALTH CARE EDUCATION/TRAINING PROGRAM
Payer: MEDICARE

## 2023-02-09 VITALS
OXYGEN SATURATION: 96 % | HEART RATE: 58 BPM | TEMPERATURE: 99 F | DIASTOLIC BLOOD PRESSURE: 78 MMHG | RESPIRATION RATE: 22 BRPM | SYSTOLIC BLOOD PRESSURE: 135 MMHG

## 2023-02-09 DIAGNOSIS — R07.9 CHEST PAIN, UNSPECIFIED TYPE: Primary | ICD-10-CM

## 2023-02-09 LAB
ANION GAP SERPL CALC-SCNC: 4 MMOL/L (ref 0–18)
ATRIAL RATE: 65 BPM
ATRIAL RATE: 72 BPM
BASOPHILS # BLD AUTO: 0.03 X10(3) UL (ref 0–0.2)
BASOPHILS NFR BLD AUTO: 0.3 %
BUN BLD-MCNC: 17 MG/DL (ref 7–18)
BUN/CREAT SERPL: 16.5 (ref 10–20)
CALCIUM BLD-MCNC: 10.4 MG/DL (ref 8.5–10.1)
CHLORIDE SERPL-SCNC: 105 MMOL/L (ref 98–112)
CO2 SERPL-SCNC: 32 MMOL/L (ref 21–32)
CREAT BLD-MCNC: 1.03 MG/DL
DEPRECATED RDW RBC AUTO: 43.5 FL (ref 35.1–46.3)
EOSINOPHIL # BLD AUTO: 0.07 X10(3) UL (ref 0–0.7)
EOSINOPHIL NFR BLD AUTO: 0.8 %
ERYTHROCYTE [DISTWIDTH] IN BLOOD BY AUTOMATED COUNT: 12.3 % (ref 11–15)
GFR SERPLBLD BASED ON 1.73 SQ M-ARVRAT: 57 ML/MIN/1.73M2 (ref 60–?)
GLUCOSE BLD-MCNC: 114 MG/DL (ref 70–99)
HCT VFR BLD AUTO: 40.8 %
HGB BLD-MCNC: 13.2 G/DL
IMM GRANULOCYTES # BLD AUTO: 0.02 X10(3) UL (ref 0–1)
IMM GRANULOCYTES NFR BLD: 0.2 %
LYMPHOCYTES # BLD AUTO: 2.86 X10(3) UL (ref 1–4)
LYMPHOCYTES NFR BLD AUTO: 32.3 %
MCH RBC QN AUTO: 30.8 PG (ref 26–34)
MCHC RBC AUTO-ENTMCNC: 32.4 G/DL (ref 31–37)
MCV RBC AUTO: 95.3 FL
MONOCYTES # BLD AUTO: 0.85 X10(3) UL (ref 0.1–1)
MONOCYTES NFR BLD AUTO: 9.6 %
NEUTROPHILS # BLD AUTO: 5.02 X10 (3) UL (ref 1.5–7.7)
NEUTROPHILS # BLD AUTO: 5.02 X10(3) UL (ref 1.5–7.7)
NEUTROPHILS NFR BLD AUTO: 56.8 %
OSMOLALITY SERPL CALC.SUM OF ELEC: 294 MOSM/KG (ref 275–295)
P AXIS: 31 DEGREES
P AXIS: 32 DEGREES
P-R INTERVAL: 192 MS
P-R INTERVAL: 204 MS
PLATELET # BLD AUTO: 368 10(3)UL (ref 150–450)
POTASSIUM SERPL-SCNC: 3.6 MMOL/L (ref 3.5–5.1)
Q-T INTERVAL: 390 MS
Q-T INTERVAL: 430 MS
QRS DURATION: 114 MS
QRS DURATION: 118 MS
QTC CALCULATION (BEZET): 427 MS
QTC CALCULATION (BEZET): 447 MS
R AXIS: -49 DEGREES
R AXIS: -52 DEGREES
RBC # BLD AUTO: 4.28 X10(6)UL
SODIUM SERPL-SCNC: 141 MMOL/L (ref 136–145)
T AXIS: 48 DEGREES
T AXIS: 53 DEGREES
TROPONIN I HIGH SENSITIVITY: 50 NG/L
TROPONIN I HIGH SENSITIVITY: 54 NG/L
VENTRICULAR RATE: 65 BPM
VENTRICULAR RATE: 72 BPM
WBC # BLD AUTO: 8.9 X10(3) UL (ref 4–11)

## 2023-02-09 PROCEDURE — 99284 EMERGENCY DEPT VISIT MOD MDM: CPT

## 2023-02-09 PROCEDURE — 36415 COLL VENOUS BLD VENIPUNCTURE: CPT

## 2023-02-09 PROCEDURE — 93010 ELECTROCARDIOGRAM REPORT: CPT

## 2023-02-09 PROCEDURE — 80048 BASIC METABOLIC PNL TOTAL CA: CPT | Performed by: STUDENT IN AN ORGANIZED HEALTH CARE EDUCATION/TRAINING PROGRAM

## 2023-02-09 PROCEDURE — 85025 COMPLETE CBC W/AUTO DIFF WBC: CPT | Performed by: STUDENT IN AN ORGANIZED HEALTH CARE EDUCATION/TRAINING PROGRAM

## 2023-02-09 PROCEDURE — 93005 ELECTROCARDIOGRAM TRACING: CPT

## 2023-02-09 PROCEDURE — 71045 X-RAY EXAM CHEST 1 VIEW: CPT | Performed by: STUDENT IN AN ORGANIZED HEALTH CARE EDUCATION/TRAINING PROGRAM

## 2023-02-09 PROCEDURE — 84484 ASSAY OF TROPONIN QUANT: CPT | Performed by: STUDENT IN AN ORGANIZED HEALTH CARE EDUCATION/TRAINING PROGRAM

## 2023-02-09 RX ORDER — ACETAMINOPHEN 500 MG
1000 TABLET ORAL ONCE
Status: COMPLETED | OUTPATIENT
Start: 2023-02-09 | End: 2023-02-09

## 2023-02-09 NOTE — DISCHARGE INSTRUCTIONS
.Thank you for seeking care at [facility]. You have been seen and evaluated for chest discomfort     We discussed the results of your workup. Your labs, EKG, and chest x-ray did not show severe findings   Please read the instructions provided   If given prescriptions, take as instructed    Remember, your care process does not end after your visit today. Please follow-up with your doctor within 1-2 days for a follow-up check to ensure you are  improving, to see if you need any further evaluation/testing, or to evaluate for any alternate diagnoses. Please return to the emergency department if you develop severe and persistent chest pain, difficulty breathing, back pain, abdominal pain, lightheadedness or dizziness, passing out, new swelling, fevers, coughing up blood, feeling ill, not acting normally, worsening symptoms, or for any other concerns as these can be signs of a medical emergency. We hope you feel better.

## 2023-02-13 DIAGNOSIS — J39.2 NASOPHARYNGEAL MASS: Primary | ICD-10-CM

## 2023-02-15 ENCOUNTER — TELEPHONE (OUTPATIENT)
Dept: CASE MANAGEMENT | Age: 73
End: 2023-02-15

## 2023-02-15 DIAGNOSIS — J34.89 NASAL LESION: Primary | ICD-10-CM

## 2023-02-15 NOTE — TELEPHONE ENCOUNTER
Good Afternoon [facility] and staff,    Patient is scheduled for surgery on 2/20/23. No active insurance in chart.     [facility] [place] PPO has termed    Please have  call patient to find out if has new insurance and update in chart    Thank you    [facility]

## 2023-02-17 ENCOUNTER — LAB ENCOUNTER (OUTPATIENT)
Dept: LAB | Facility: HOSPITAL | Age: 73
End: 2023-02-17
Attending: OTOLARYNGOLOGY
Payer: MEDICARE

## 2023-02-17 DIAGNOSIS — Z01.818 PREOP TESTING: ICD-10-CM

## 2023-02-17 LAB — SARS-COV-2 RNA RESP QL NAA+PROBE: NOT DETECTED

## 2023-02-22 ENCOUNTER — OFFICE VISIT (OUTPATIENT)
Dept: INTERNAL MEDICINE CLINIC | Facility: CLINIC | Age: 73
End: 2023-02-22
Payer: MEDICARE

## 2023-02-22 ENCOUNTER — TELEPHONE (OUTPATIENT)
Dept: OTOLARYNGOLOGY | Facility: CLINIC | Age: 73
End: 2023-02-22

## 2023-02-22 ENCOUNTER — LAB ENCOUNTER (OUTPATIENT)
Dept: LAB | Facility: HOSPITAL | Age: 73
End: 2023-02-22
Attending: FAMILY MEDICINE
Payer: MEDICARE

## 2023-02-22 VITALS
SYSTOLIC BLOOD PRESSURE: 124 MMHG | DIASTOLIC BLOOD PRESSURE: 70 MMHG | BODY MASS INDEX: 35.79 KG/M2 | WEIGHT: 250 LBS | OXYGEN SATURATION: 97 % | TEMPERATURE: 98 F | HEART RATE: 78 BPM | HEIGHT: 70 IN

## 2023-02-22 DIAGNOSIS — N32.81 DETRUSOR INSTABILITY: ICD-10-CM

## 2023-02-22 DIAGNOSIS — I25.10 CORONARY ARTERIOSCLEROSIS: ICD-10-CM

## 2023-02-22 DIAGNOSIS — L60.9 NAIL ABNORMALITIES: ICD-10-CM

## 2023-02-22 DIAGNOSIS — I48.0 PAROXYSMAL ATRIAL FIBRILLATION (HCC): ICD-10-CM

## 2023-02-22 DIAGNOSIS — N39.41 URGE INCONTINENCE: ICD-10-CM

## 2023-02-22 DIAGNOSIS — M48.07 SPINAL STENOSIS, LUMBOSACRAL REGION: ICD-10-CM

## 2023-02-22 DIAGNOSIS — J34.89 NASAL MASS: Primary | ICD-10-CM

## 2023-02-22 DIAGNOSIS — G89.29 CHRONIC BILATERAL LOW BACK PAIN WITH BILATERAL SCIATICA: ICD-10-CM

## 2023-02-22 DIAGNOSIS — M48.02 NEURAL FORAMINAL STENOSIS OF CERVICAL SPINE: ICD-10-CM

## 2023-02-22 DIAGNOSIS — E78.5 DYSLIPIDEMIA: ICD-10-CM

## 2023-02-22 DIAGNOSIS — K21.9 GASTROESOPHAGEAL REFLUX DISEASE WITHOUT ESOPHAGITIS: ICD-10-CM

## 2023-02-22 DIAGNOSIS — E11.9 TYPE 2 DIABETES MELLITUS WITHOUT COMPLICATION, WITHOUT LONG-TERM CURRENT USE OF INSULIN (HCC): Primary | ICD-10-CM

## 2023-02-22 DIAGNOSIS — I10 BENIGN ESSENTIAL HTN: ICD-10-CM

## 2023-02-22 DIAGNOSIS — J45.20 MILD INTERMITTENT ASTHMA WITHOUT COMPLICATION: ICD-10-CM

## 2023-02-22 DIAGNOSIS — M54.42 CHRONIC BILATERAL LOW BACK PAIN WITH BILATERAL SCIATICA: ICD-10-CM

## 2023-02-22 DIAGNOSIS — M54.41 CHRONIC BILATERAL LOW BACK PAIN WITH BILATERAL SCIATICA: ICD-10-CM

## 2023-02-22 LAB
ALBUMIN SERPL-MCNC: 4.1 G/DL (ref 3.4–5)
ALBUMIN/GLOB SERPL: 1.1 {RATIO} (ref 1–2)
ALP LIVER SERPL-CCNC: 61 U/L
ALT SERPL-CCNC: 21 U/L
ANION GAP SERPL CALC-SCNC: 8 MMOL/L (ref 0–18)
AST SERPL-CCNC: 18 U/L (ref 15–37)
BILIRUB SERPL-MCNC: 0.5 MG/DL (ref 0.1–2)
BILIRUB UR QL: NEGATIVE
BUN BLD-MCNC: 16 MG/DL (ref 7–18)
BUN/CREAT SERPL: 15 (ref 10–20)
CALCIUM BLD-MCNC: 10 MG/DL (ref 8.5–10.1)
CHLORIDE SERPL-SCNC: 110 MMOL/L (ref 98–112)
CHOLEST SERPL-MCNC: 155 MG/DL (ref ?–200)
CO2 SERPL-SCNC: 27 MMOL/L (ref 21–32)
COLOR UR: YELLOW
CREAT BLD-MCNC: 1.07 MG/DL
CREAT UR-SCNC: 119 MG/DL
EST. AVERAGE GLUCOSE BLD GHB EST-MCNC: 126 MG/DL (ref 68–126)
FASTING PATIENT LIPID ANSWER: NO
FASTING STATUS PATIENT QL REPORTED: NO
GFR SERPLBLD BASED ON 1.73 SQ M-ARVRAT: 55 ML/MIN/1.73M2 (ref 60–?)
GLOBULIN PLAS-MCNC: 3.6 G/DL (ref 2.8–4.4)
GLUCOSE BLD-MCNC: 113 MG/DL (ref 70–99)
GLUCOSE UR-MCNC: >1000 MG/DL
HBA1C MFR BLD: 6 % (ref ?–5.7)
HDLC SERPL-MCNC: 65 MG/DL (ref 40–59)
KETONES UR-MCNC: NEGATIVE MG/DL
LDLC SERPL CALC-MCNC: 75 MG/DL (ref ?–100)
LEUKOCYTE ESTERASE UR QL STRIP.AUTO: 250
MICROALBUMIN UR-MCNC: 3.65 MG/DL
MICROALBUMIN/CREAT 24H UR-RTO: 30.7 UG/MG (ref ?–30)
NITRITE UR QL STRIP.AUTO: NEGATIVE
NONHDLC SERPL-MCNC: 90 MG/DL (ref ?–130)
OSMOLALITY SERPL CALC.SUM OF ELEC: 302 MOSM/KG (ref 275–295)
PH UR: 5 [PH] (ref 5–8)
POTASSIUM SERPL-SCNC: 3.5 MMOL/L (ref 3.5–5.1)
PROT SERPL-MCNC: 7.7 G/DL (ref 6.4–8.2)
PROT UR-MCNC: NEGATIVE MG/DL
SODIUM SERPL-SCNC: 145 MMOL/L (ref 136–145)
SP GR UR STRIP: >1.03 (ref 1–1.03)
TRIGL SERPL-MCNC: 78 MG/DL (ref 30–149)
UROBILINOGEN UR STRIP-ACNC: NORMAL
VLDLC SERPL CALC-MCNC: 12 MG/DL (ref 0–30)

## 2023-02-22 PROCEDURE — 87077 CULTURE AEROBIC IDENTIFY: CPT

## 2023-02-22 PROCEDURE — 87086 URINE CULTURE/COLONY COUNT: CPT

## 2023-02-22 PROCEDURE — 3008F BODY MASS INDEX DOCD: CPT | Performed by: FAMILY MEDICINE

## 2023-02-22 PROCEDURE — 83036 HEMOGLOBIN GLYCOSYLATED A1C: CPT | Performed by: FAMILY MEDICINE

## 2023-02-22 PROCEDURE — 99215 OFFICE O/P EST HI 40 MIN: CPT | Performed by: FAMILY MEDICINE

## 2023-02-22 PROCEDURE — 82570 ASSAY OF URINE CREATININE: CPT | Performed by: FAMILY MEDICINE

## 2023-02-22 PROCEDURE — 3078F DIAST BP <80 MM HG: CPT | Performed by: FAMILY MEDICINE

## 2023-02-22 PROCEDURE — 80053 COMPREHEN METABOLIC PANEL: CPT | Performed by: FAMILY MEDICINE

## 2023-02-22 PROCEDURE — 81001 URINALYSIS AUTO W/SCOPE: CPT | Performed by: FAMILY MEDICINE

## 2023-02-22 PROCEDURE — 36415 COLL VENOUS BLD VENIPUNCTURE: CPT | Performed by: FAMILY MEDICINE

## 2023-02-22 PROCEDURE — 87186 SC STD MICRODIL/AGAR DIL: CPT

## 2023-02-22 PROCEDURE — 3061F NEG MICROALBUMINURIA REV: CPT | Performed by: FAMILY MEDICINE

## 2023-02-22 PROCEDURE — 3060F POS MICROALBUMINURIA REV: CPT | Performed by: FAMILY MEDICINE

## 2023-02-22 PROCEDURE — 80061 LIPID PANEL: CPT | Performed by: FAMILY MEDICINE

## 2023-02-22 PROCEDURE — 3074F SYST BP LT 130 MM HG: CPT | Performed by: FAMILY MEDICINE

## 2023-02-22 PROCEDURE — 3044F HG A1C LEVEL LT 7.0%: CPT | Performed by: FAMILY MEDICINE

## 2023-02-22 PROCEDURE — 82043 UR ALBUMIN QUANTITATIVE: CPT | Performed by: FAMILY MEDICINE

## 2023-02-22 RX ORDER — FENOFIBRATE 200 MG/1
1 CAPSULE ORAL NIGHTLY
Qty: 90 CAPSULE | Refills: 0 | Status: SHIPPED | OUTPATIENT
Start: 2023-02-22

## 2023-02-22 RX ORDER — MONTELUKAST SODIUM 10 MG/1
10 TABLET ORAL DAILY
Qty: 90 TABLET | Refills: 0 | Status: SHIPPED | OUTPATIENT
Start: 2023-02-22

## 2023-02-22 RX ORDER — ROSUVASTATIN CALCIUM 10 MG/1
10 TABLET, COATED ORAL NIGHTLY
Qty: 90 TABLET | Refills: 0 | Status: SHIPPED | OUTPATIENT
Start: 2023-02-22

## 2023-02-22 RX ORDER — OMEPRAZOLE 40 MG/1
40 CAPSULE, DELAYED RELEASE ORAL DAILY
Qty: 90 CAPSULE | Refills: 0 | Status: SHIPPED | OUTPATIENT
Start: 2023-02-22

## 2023-02-22 RX ORDER — HYDROCHLOROTHIAZIDE 12.5 MG/1
12.5 CAPSULE, GELATIN COATED ORAL DAILY
Qty: 90 CAPSULE | Refills: 0 | Status: SHIPPED | OUTPATIENT
Start: 2023-02-22

## 2023-02-22 RX ORDER — EMPAGLIFLOZIN 25 MG/1
1 TABLET, FILM COATED ORAL
Qty: 90 TABLET | Refills: 0 | Status: SHIPPED | OUTPATIENT
Start: 2023-02-22

## 2023-02-23 ENCOUNTER — TELEPHONE (OUTPATIENT)
Dept: INTERNAL MEDICINE CLINIC | Facility: CLINIC | Age: 73
End: 2023-02-23

## 2023-02-24 ENCOUNTER — TELEPHONE (OUTPATIENT)
Dept: UROLOGY | Facility: HOSPITAL | Age: 73
End: 2023-02-24

## 2023-02-24 RX ORDER — NITROFURANTOIN 25; 75 MG/1; MG/1
100 CAPSULE ORAL 2 TIMES DAILY
Qty: 14 CAPSULE | Refills: 0 | Status: SHIPPED | OUTPATIENT
Start: 2023-02-24

## 2023-02-24 NOTE — TELEPHONE ENCOUNTER
Telephone call to patient with test results    Ucx + for >100,000 CFU/ML Klebsiella pneumoniae    Allergies reviewed    Discussed with AVTAR Armenta Macrobid 100 mg BID for 7 days    Will send antibiotics to pt's preferred pharmacy. Encouraged PO hydration, AZO prn for pain    Encouraged to avoid bladder irritants such as caffeine, alcohol, or carbonation    All questions answered     Follow up as scheduled, sooner prn    Patient understands and agrees to plan.

## 2023-02-28 DIAGNOSIS — E78.5 DYSLIPIDEMIA: ICD-10-CM

## 2023-02-28 DIAGNOSIS — E11.9 TYPE 2 DIABETES MELLITUS WITHOUT COMPLICATION, WITHOUT LONG-TERM CURRENT USE OF INSULIN (HCC): ICD-10-CM

## 2023-03-01 ENCOUNTER — TELEPHONE (OUTPATIENT)
Dept: UROLOGY | Facility: HOSPITAL | Age: 73
End: 2023-03-01

## 2023-03-01 NOTE — TELEPHONE ENCOUNTER
Incoming telephone call received from the patient. Message left on nursing line from the patient is as follows. Message states she received a message from our office stating she has a urinary tract infection and an     antibiotic was sent to her pharmacy. She went to go  the prescription and it wasn't ready for . Returned patients telephone call. Informed the patient that our office spoke to Ahsan Keen from her pharmacy and the prescription is ready for her to     pick it up at her earliest convince. Patient verbalized understanding. Encouraged to call with questions or concern.

## 2023-03-02 RX ORDER — FENOFIBRATE 200 MG/1
CAPSULE ORAL
Qty: 90 CAPSULE | Refills: 0 | Status: SHIPPED | OUTPATIENT
Start: 2023-03-02

## 2023-03-06 ENCOUNTER — TELEPHONE (OUTPATIENT)
Dept: OTOLARYNGOLOGY | Facility: CLINIC | Age: 73
End: 2023-03-06

## 2023-03-06 ENCOUNTER — VIRTUAL PHONE E/M (OUTPATIENT)
Dept: UROLOGY | Facility: HOSPITAL | Age: 73
End: 2023-03-06
Attending: PHYSICIAN ASSISTANT
Payer: MEDICARE

## 2023-03-06 VITALS — BODY MASS INDEX: 35.79 KG/M2 | WEIGHT: 250 LBS | HEIGHT: 70 IN

## 2023-03-06 DIAGNOSIS — N32.81 DETRUSOR INSTABILITY: ICD-10-CM

## 2023-03-06 DIAGNOSIS — N95.2 POSTMENOPAUSAL ATROPHIC VAGINITIS: ICD-10-CM

## 2023-03-06 DIAGNOSIS — R39.15 URINARY URGENCY: ICD-10-CM

## 2023-03-06 DIAGNOSIS — N39.41 URGE INCONTINENCE: Primary | ICD-10-CM

## 2023-03-06 DIAGNOSIS — N81.84 PELVIC MUSCLE WASTING: ICD-10-CM

## 2023-03-07 NOTE — TELEPHONE ENCOUNTER
Patient has confirmed and agrees with [facility] 3/13/23 for procedure with Dr. Zachary Brown.

## 2023-03-10 ENCOUNTER — LAB ENCOUNTER (OUTPATIENT)
Dept: LAB | Facility: HOSPITAL | Age: 73
End: 2023-03-10
Attending: OTOLARYNGOLOGY
Payer: MEDICARE

## 2023-03-10 DIAGNOSIS — Z01.818 PREOP TESTING: ICD-10-CM

## 2023-03-10 LAB — SARS-COV-2 RNA RESP QL NAA+PROBE: NOT DETECTED

## 2023-03-13 ENCOUNTER — ANESTHESIA EVENT (OUTPATIENT)
Dept: SURGERY | Facility: HOSPITAL | Age: 73
End: 2023-03-13
Payer: MEDICARE

## 2023-03-13 ENCOUNTER — HOSPITAL ENCOUNTER (OUTPATIENT)
Facility: HOSPITAL | Age: 73
Setting detail: HOSPITAL OUTPATIENT SURGERY
Discharge: HOME | End: 2023-03-13
Attending: OTOLARYNGOLOGY | Admitting: OTOLARYNGOLOGY
Payer: MEDICARE

## 2023-03-13 ENCOUNTER — ANESTHESIA (OUTPATIENT)
Dept: SURGERY | Facility: HOSPITAL | Age: 73
End: 2023-03-13
Payer: MEDICARE

## 2023-03-13 VITALS
HEART RATE: 69 BPM | SYSTOLIC BLOOD PRESSURE: 136 MMHG | TEMPERATURE: 98 F | OXYGEN SATURATION: 100 % | DIASTOLIC BLOOD PRESSURE: 62 MMHG | BODY MASS INDEX: 34.65 KG/M2 | RESPIRATION RATE: 16 BRPM | WEIGHT: 242 LBS | HEIGHT: 70 IN

## 2023-03-13 DIAGNOSIS — Z01.818 PREOP TESTING: Primary | ICD-10-CM

## 2023-03-13 PROBLEM — J39.2 NASOPHARYNGEAL MASS: Status: ACTIVE | Noted: 2023-03-13

## 2023-03-13 LAB — GLUCOSE BLDC GLUCOMTR-MCNC: 103 MG/DL (ref 70–99)

## 2023-03-13 PROCEDURE — 82962 GLUCOSE BLOOD TEST: CPT

## 2023-03-13 PROCEDURE — 09JK8ZZ INSPECTION OF NASAL MUCOSA AND SOFT TISSUE, VIA NATURAL OR ARTIFICIAL OPENING ENDOSCOPIC: ICD-10-PCS | Performed by: OTOLARYNGOLOGY

## 2023-03-13 RX ORDER — DEXTROSE MONOHYDRATE 25 G/50ML
50 INJECTION, SOLUTION INTRAVENOUS
Status: DISCONTINUED | OUTPATIENT
Start: 2023-03-13 | End: 2023-03-13

## 2023-03-13 RX ORDER — NALOXONE HYDROCHLORIDE 0.4 MG/ML
80 INJECTION, SOLUTION INTRAMUSCULAR; INTRAVENOUS; SUBCUTANEOUS AS NEEDED
Status: DISCONTINUED | OUTPATIENT
Start: 2023-03-13 | End: 2023-03-13

## 2023-03-13 RX ORDER — ONDANSETRON 2 MG/ML
INJECTION INTRAMUSCULAR; INTRAVENOUS AS NEEDED
Status: DISCONTINUED | OUTPATIENT
Start: 2023-03-13 | End: 2023-03-13 | Stop reason: SURG

## 2023-03-13 RX ORDER — NICOTINE POLACRILEX 4 MG
30 LOZENGE BUCCAL
Status: DISCONTINUED | OUTPATIENT
Start: 2023-03-13 | End: 2023-03-13 | Stop reason: HOSPADM

## 2023-03-13 RX ORDER — HYDROMORPHONE HYDROCHLORIDE 1 MG/ML
0.4 INJECTION, SOLUTION INTRAMUSCULAR; INTRAVENOUS; SUBCUTANEOUS EVERY 2 HOUR PRN
OUTPATIENT
Start: 2023-03-13

## 2023-03-13 RX ORDER — HYDROMORPHONE HYDROCHLORIDE 1 MG/ML
0.4 INJECTION, SOLUTION INTRAMUSCULAR; INTRAVENOUS; SUBCUTANEOUS EVERY 5 MIN PRN
Status: DISCONTINUED | OUTPATIENT
Start: 2023-03-13 | End: 2023-03-13

## 2023-03-13 RX ORDER — DEXAMETHASONE SODIUM PHOSPHATE 4 MG/ML
VIAL (ML) INJECTION AS NEEDED
Status: DISCONTINUED | OUTPATIENT
Start: 2023-03-13 | End: 2023-03-13 | Stop reason: SURG

## 2023-03-13 RX ORDER — FAMOTIDINE 20 MG/1
20 TABLET, FILM COATED ORAL ONCE
Status: COMPLETED | OUTPATIENT
Start: 2023-03-13 | End: 2023-03-13

## 2023-03-13 RX ORDER — NICOTINE POLACRILEX 4 MG
30 LOZENGE BUCCAL
Status: DISCONTINUED | OUTPATIENT
Start: 2023-03-13 | End: 2023-03-13

## 2023-03-13 RX ORDER — ONDANSETRON 4 MG/1
4 TABLET, FILM COATED ORAL EVERY 6 HOURS PRN
OUTPATIENT
Start: 2023-03-13

## 2023-03-13 RX ORDER — OXYCODONE HYDROCHLORIDE 5 MG/1
2.5 TABLET ORAL EVERY 4 HOURS PRN
OUTPATIENT
Start: 2023-03-13

## 2023-03-13 RX ORDER — ACETAMINOPHEN 325 MG/1
650 TABLET ORAL
OUTPATIENT
Start: 2023-03-13

## 2023-03-13 RX ORDER — NICOTINE POLACRILEX 4 MG
15 LOZENGE BUCCAL
Status: DISCONTINUED | OUTPATIENT
Start: 2023-03-13 | End: 2023-03-13

## 2023-03-13 RX ORDER — HYDROMORPHONE HYDROCHLORIDE 1 MG/ML
0.2 INJECTION, SOLUTION INTRAMUSCULAR; INTRAVENOUS; SUBCUTANEOUS EVERY 2 HOUR PRN
OUTPATIENT
Start: 2023-03-13

## 2023-03-13 RX ORDER — HYDROMORPHONE HYDROCHLORIDE 1 MG/ML
0.2 INJECTION, SOLUTION INTRAMUSCULAR; INTRAVENOUS; SUBCUTANEOUS EVERY 5 MIN PRN
Status: DISCONTINUED | OUTPATIENT
Start: 2023-03-13 | End: 2023-03-13

## 2023-03-13 RX ORDER — OXYCODONE HYDROCHLORIDE 5 MG/1
5 TABLET ORAL EVERY 4 HOURS PRN
OUTPATIENT
Start: 2023-03-13

## 2023-03-13 RX ORDER — NICOTINE POLACRILEX 4 MG
15 LOZENGE BUCCAL
Status: DISCONTINUED | OUTPATIENT
Start: 2023-03-13 | End: 2023-03-13 | Stop reason: HOSPADM

## 2023-03-13 RX ORDER — LIDOCAINE HYDROCHLORIDE 10 MG/ML
INJECTION, SOLUTION EPIDURAL; INFILTRATION; INTRACAUDAL; PERINEURAL AS NEEDED
Status: DISCONTINUED | OUTPATIENT
Start: 2023-03-13 | End: 2023-03-13 | Stop reason: SURG

## 2023-03-13 RX ORDER — SODIUM CHLORIDE, SODIUM LACTATE, POTASSIUM CHLORIDE, CALCIUM CHLORIDE 600; 310; 30; 20 MG/100ML; MG/100ML; MG/100ML; MG/100ML
INJECTION, SOLUTION INTRAVENOUS CONTINUOUS
Status: DISCONTINUED | OUTPATIENT
Start: 2023-03-13 | End: 2023-03-13

## 2023-03-13 RX ORDER — ONDANSETRON 2 MG/ML
4 INJECTION INTRAMUSCULAR; INTRAVENOUS EVERY 6 HOURS PRN
OUTPATIENT
Start: 2023-03-13

## 2023-03-13 RX ORDER — MORPHINE SULFATE 4 MG/ML
4 INJECTION, SOLUTION INTRAMUSCULAR; INTRAVENOUS EVERY 10 MIN PRN
Status: DISCONTINUED | OUTPATIENT
Start: 2023-03-13 | End: 2023-03-13

## 2023-03-13 RX ORDER — METOCLOPRAMIDE 10 MG/1
10 TABLET ORAL ONCE
Status: COMPLETED | OUTPATIENT
Start: 2023-03-13 | End: 2023-03-13

## 2023-03-13 RX ORDER — LIDOCAINE HYDROCHLORIDE AND EPINEPHRINE 10; 10 MG/ML; UG/ML
INJECTION, SOLUTION INFILTRATION; PERINEURAL AS NEEDED
Status: DISCONTINUED | OUTPATIENT
Start: 2023-03-13 | End: 2023-03-13 | Stop reason: HOSPADM

## 2023-03-13 RX ORDER — HYDROMORPHONE HYDROCHLORIDE 1 MG/ML
0.6 INJECTION, SOLUTION INTRAMUSCULAR; INTRAVENOUS; SUBCUTANEOUS EVERY 5 MIN PRN
Status: DISCONTINUED | OUTPATIENT
Start: 2023-03-13 | End: 2023-03-13

## 2023-03-13 RX ORDER — MORPHINE SULFATE 4 MG/ML
2 INJECTION, SOLUTION INTRAMUSCULAR; INTRAVENOUS EVERY 10 MIN PRN
Status: DISCONTINUED | OUTPATIENT
Start: 2023-03-13 | End: 2023-03-13

## 2023-03-13 RX ORDER — ACETAMINOPHEN 500 MG
1000 TABLET ORAL ONCE
Status: COMPLETED | OUTPATIENT
Start: 2023-03-13 | End: 2023-03-13

## 2023-03-13 RX ORDER — SODIUM CHLORIDE 0.9 % (FLUSH) 0.9 %
10 SYRINGE (ML) INJECTION AS NEEDED
OUTPATIENT
Start: 2023-03-13

## 2023-03-13 RX ORDER — DEXTROSE MONOHYDRATE 25 G/50ML
50 INJECTION, SOLUTION INTRAVENOUS
Status: DISCONTINUED | OUTPATIENT
Start: 2023-03-13 | End: 2023-03-13 | Stop reason: HOSPADM

## 2023-03-13 RX ORDER — ONDANSETRON 4 MG/1
4 TABLET, ORALLY DISINTEGRATING ORAL EVERY 6 HOURS PRN
OUTPATIENT
Start: 2023-03-13

## 2023-03-13 RX ORDER — MORPHINE SULFATE 10 MG/ML
6 INJECTION, SOLUTION INTRAMUSCULAR; INTRAVENOUS EVERY 10 MIN PRN
Status: DISCONTINUED | OUTPATIENT
Start: 2023-03-13 | End: 2023-03-13

## 2023-03-13 RX ADMIN — DEXAMETHASONE SODIUM PHOSPHATE 4 MG: 4 MG/ML VIAL (ML) INJECTION at 11:05:00

## 2023-03-13 RX ADMIN — LIDOCAINE HYDROCHLORIDE 50 MG: 10 INJECTION, SOLUTION EPIDURAL; INFILTRATION; INTRACAUDAL; PERINEURAL at 11:00:00

## 2023-03-13 RX ADMIN — ONDANSETRON 4 MG: 2 INJECTION INTRAMUSCULAR; INTRAVENOUS at 11:05:00

## 2023-03-13 RX ADMIN — SODIUM CHLORIDE, SODIUM LACTATE, POTASSIUM CHLORIDE, CALCIUM CHLORIDE: 600; 310; 30; 20 INJECTION, SOLUTION INTRAVENOUS at 11:15:00

## 2023-03-13 RX ADMIN — SODIUM CHLORIDE, SODIUM LACTATE, POTASSIUM CHLORIDE, CALCIUM CHLORIDE: 600; 310; 30; 20 INJECTION, SOLUTION INTRAVENOUS at 10:53:00

## 2023-03-13 NOTE — BRIEF OP NOTE
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

## 2023-03-13 NOTE — ANESTHESIA PROCEDURE NOTES
Airway  Date/Time: 3/13/2023 11:01 AM  Urgency: elective    Airway not difficult    General Information and Staff    Patient location during procedure: OR  Anesthesiologist: Cony Barrios MD  Resident/CRNA: Anum Davis CRNA  Performed: CRNA   Performed by: Anum Davis CRNA  Authorized by: Cony Barrios MD      Indications and Patient Condition  Indications for airway management: airway protection and anesthesia  Spontaneous Ventilation: absent  Sedation level: deep  Preoxygenated: yes  Patient position: sniffing  Mask difficulty assessment: 0 - not attempted    Final Airway Details  Final airway type: endotracheal airway      Successful airway: ETT  Cuffed: yes   Successful intubation technique: direct laryngoscopy  Endotracheal tube insertion site: oral  Blade: Rocha  Blade size: #2  ETT size (mm): 7.0    Cormack-Lehane Classification: grade I - full view of glottis  Placement verified by: chest auscultation and capnometry   Measured from: gums  ETT to gums (cm): 21  Number of attempts at approach: 1  Ventilation between attempts: none  Number of other approaches attempted: 0

## 2023-03-13 NOTE — INTERVAL H&P NOTE
Pre-op Diagnosis: Nasal mass [J34.89]    The above referenced H&P was reviewed by Filomena Bowen. Deann Montague MD on 3/13/2023, the patient was examined and no significant changes have occurred in the patient's condition since the H&P was performed. I discussed with the patient and/or legal representative the potential benefits, risks and side effects of this procedure; the likelihood of the patient achieving goals; and potential problems that might occur during recuperation. I discussed reasonable alternatives to the procedure, including risks, benefits and side effects related to the alternatives and risks related to not receiving this procedure. We will proceed with procedure as planned.

## 2023-03-14 ENCOUNTER — TELEPHONE (OUTPATIENT)
Dept: OTOLARYNGOLOGY | Facility: CLINIC | Age: 73
End: 2023-03-14

## 2023-03-14 NOTE — TELEPHONE ENCOUNTER
Future Appointments   Date Time Provider Za Rivers   3/20/2023  2:00 PM Emeka Hernandez, CECILIA [facility]     Pt is post op day 1 nasal endoscopy and biopsy of nasopharyngeal mass. Per pt doing well, no bleeding or fever. Pt scheduled for post op and advise advise to contact office if symptoms change or worsen. Pt verbalized understanding.

## 2023-03-15 ENCOUNTER — TELEPHONE (OUTPATIENT)
Dept: INTERNAL MEDICINE CLINIC | Facility: CLINIC | Age: 73
End: 2023-03-15

## 2023-03-15 RX ORDER — DILTIAZEM HYDROCHLORIDE 300 MG/1
300 CAPSULE, COATED, EXTENDED RELEASE ORAL DAILY
Qty: 30 CAPSULE | Refills: 2 | Status: SHIPPED | OUTPATIENT
Start: 2023-03-15

## 2023-03-24 ENCOUNTER — TELEPHONE (OUTPATIENT)
Dept: UROLOGY | Facility: HOSPITAL | Age: 73
End: 2023-03-24

## 2023-03-24 ENCOUNTER — LAB ENCOUNTER (OUTPATIENT)
Dept: LAB | Facility: HOSPITAL | Age: 73
End: 2023-03-24
Attending: PHYSICIAN ASSISTANT
Payer: MEDICARE

## 2023-03-24 ENCOUNTER — OFFICE VISIT (OUTPATIENT)
Dept: OTOLARYNGOLOGY | Facility: CLINIC | Age: 73
End: 2023-03-24

## 2023-03-24 DIAGNOSIS — N39.41 URINARY INCONTINENCE, URGE: ICD-10-CM

## 2023-03-24 DIAGNOSIS — J39.2 NASOPHARYNGEAL MASS: Primary | ICD-10-CM

## 2023-03-24 DIAGNOSIS — N39.41 URINARY INCONTINENCE, URGE: Primary | ICD-10-CM

## 2023-03-24 PROCEDURE — 87077 CULTURE AEROBIC IDENTIFY: CPT

## 2023-03-24 PROCEDURE — 87086 URINE CULTURE/COLONY COUNT: CPT

## 2023-03-24 PROCEDURE — 87186 SC STD MICRODIL/AGAR DIL: CPT

## 2023-03-24 PROCEDURE — 99213 OFFICE O/P EST LOW 20 MIN: CPT | Performed by: OTOLARYNGOLOGY

## 2023-03-24 PROCEDURE — 1126F AMNT PAIN NOTED NONE PRSNT: CPT | Performed by: OTOLARYNGOLOGY

## 2023-03-24 NOTE — TELEPHONE ENCOUNTER
TC to patient   Need ucx prior to Botox procedure scheduled for 3/31/23  Patient on her way to the hospital now  Will go to outpatient lab to leave sample  Will follow for results  Patient verbalized understanding and has no further questions

## 2023-03-26 ENCOUNTER — PATIENT OUTREACH (OUTPATIENT)
Dept: CASE MANAGEMENT | Age: 73
End: 2023-03-26

## 2023-03-26 NOTE — PROCEDURES
The office order for PCP removal request is Approved and finalized on March 26, 2023.     Thanks,  [facility] [employer]

## 2023-03-27 ENCOUNTER — TELEPHONE (OUTPATIENT)
Dept: UROLOGY | Facility: HOSPITAL | Age: 73
End: 2023-03-27

## 2023-03-27 DIAGNOSIS — N39.41 URGE INCONTINENCE: Primary | ICD-10-CM

## 2023-03-27 RX ORDER — NITROFURANTOIN 25; 75 MG/1; MG/1
100 CAPSULE ORAL 2 TIMES DAILY
Qty: 14 CAPSULE | Refills: 0 | Status: SHIPPED | OUTPATIENT
Start: 2023-03-27 | End: 2023-04-03

## 2023-03-27 NOTE — TELEPHONE ENCOUNTER
Telephone call to patient with test results    Urine culture obtained on 03/24/2023    Allergies reviewed    Discussed with Javan Mota, AVTAR Macrobid 100 mg po bid x 7 days    Will send antibiotics to pt's preferred pharmacy    Encouraged PO hydration, AZO prn for pain    Encouraged to avoid bladder irritants such as caffeine, alcohol, or carbonation    All questions answered     Follow up as scheduled, sooner prn    Patient understands and agrees to plan - - -

## 2023-03-28 ENCOUNTER — LAB ENCOUNTER (OUTPATIENT)
Dept: LAB | Facility: HOSPITAL | Age: 73
End: 2023-03-28
Attending: OBSTETRICS & GYNECOLOGY
Payer: MEDICARE

## 2023-03-28 DIAGNOSIS — Z01.818 PRE-OP TESTING: ICD-10-CM

## 2023-03-28 LAB — SARS-COV-2 RNA RESP QL NAA+PROBE: NOT DETECTED

## 2023-03-29 RX ORDER — METOCLOPRAMIDE 10 MG/1
10 TABLET ORAL ONCE
Status: CANCELLED | OUTPATIENT
Start: 2023-03-29 | End: 2023-03-29

## 2023-03-29 RX ORDER — SODIUM CHLORIDE, SODIUM LACTATE, POTASSIUM CHLORIDE, CALCIUM CHLORIDE 600; 310; 30; 20 MG/100ML; MG/100ML; MG/100ML; MG/100ML
INJECTION, SOLUTION INTRAVENOUS CONTINUOUS
Status: CANCELLED | OUTPATIENT
Start: 2023-03-29

## 2023-03-30 NOTE — H&P
68 y/o female with urinary urgency incontinecne for surgical mgmt  No Pre operative clearance with Dr Addy Ko did not show for surgery, will cancel      Colon Pilar  [phone]

## 2023-04-04 ENCOUNTER — LAB ENCOUNTER (OUTPATIENT)
Dept: LAB | Facility: HOSPITAL | Age: 73
End: 2023-04-04
Attending: PHYSICIAN ASSISTANT
Payer: MEDICARE

## 2023-04-04 ENCOUNTER — OFFICE VISIT (OUTPATIENT)
Dept: INTERNAL MEDICINE CLINIC | Facility: CLINIC | Age: 73
End: 2023-04-04
Payer: MEDICARE

## 2023-04-04 ENCOUNTER — TELEPHONE (OUTPATIENT)
Dept: UROLOGY | Facility: CLINIC | Age: 73
End: 2023-04-04

## 2023-04-04 ENCOUNTER — TELEPHONE (OUTPATIENT)
Dept: UROLOGY | Facility: HOSPITAL | Age: 73
End: 2023-04-04

## 2023-04-04 VITALS
TEMPERATURE: 98 F | HEART RATE: 75 BPM | BODY MASS INDEX: 34.65 KG/M2 | OXYGEN SATURATION: 97 % | WEIGHT: 242 LBS | SYSTOLIC BLOOD PRESSURE: 122 MMHG | DIASTOLIC BLOOD PRESSURE: 70 MMHG | HEIGHT: 70 IN

## 2023-04-04 DIAGNOSIS — N39.41 URGE URINARY INCONTINENCE: ICD-10-CM

## 2023-04-04 DIAGNOSIS — Z01.818 ENCOUNTER FOR PRE-OPERATIVE EXAMINATION: Primary | ICD-10-CM

## 2023-04-04 DIAGNOSIS — N39.41 URGE URINARY INCONTINENCE: Primary | ICD-10-CM

## 2023-04-04 PROCEDURE — 87077 CULTURE AEROBIC IDENTIFY: CPT

## 2023-04-04 PROCEDURE — 87186 SC STD MICRODIL/AGAR DIL: CPT

## 2023-04-04 PROCEDURE — 87086 URINE CULTURE/COLONY COUNT: CPT

## 2023-04-04 NOTE — TELEPHONE ENCOUNTER
TC from pt's PCP office. Spoke with Tomy Narayanan, staff member at Dr. Emiliana Cabello, inquiring what is requested for medical clearance prior to botox. Per medical clearance request form, pt needs CBC, BMP, EKG, and medical evaluation.

## 2023-04-04 NOTE — TELEPHONE ENCOUNTER
TC to patient  She is scheduled for Botox on 4/10/23 with Dr. Melissa Leger to inform her she needs a new urine culture  Order placed   She will give sample today  She verbalized understanding and had no further questions

## 2023-04-05 ENCOUNTER — LAB ENCOUNTER (OUTPATIENT)
Dept: LAB | Facility: HOSPITAL | Age: 73
End: 2023-04-05
Attending: FAMILY MEDICINE
Payer: MEDICARE

## 2023-04-05 ENCOUNTER — LAB ENCOUNTER (OUTPATIENT)
Dept: LAB | Facility: REFERENCE LAB | Age: 73
End: 2023-04-05
Attending: FAMILY MEDICINE
Payer: MEDICARE

## 2023-04-05 DIAGNOSIS — Z01.818 ENCOUNTER FOR PRE-OPERATIVE EXAMINATION: ICD-10-CM

## 2023-04-05 LAB
ALBUMIN SERPL-MCNC: 4 G/DL (ref 3.4–5)
ALBUMIN/GLOB SERPL: 1.1 {RATIO} (ref 1–2)
ALP LIVER SERPL-CCNC: 62 U/L
ALT SERPL-CCNC: 21 U/L
ANION GAP SERPL CALC-SCNC: 7 MMOL/L (ref 0–18)
AST SERPL-CCNC: 14 U/L (ref 15–37)
BASOPHILS # BLD AUTO: 0.05 X10(3) UL (ref 0–0.2)
BASOPHILS NFR BLD AUTO: 0.5 %
BILIRUB SERPL-MCNC: 0.5 MG/DL (ref 0.1–2)
BUN BLD-MCNC: 18 MG/DL (ref 7–18)
BUN/CREAT SERPL: 15.5 (ref 10–20)
CALCIUM BLD-MCNC: 10.4 MG/DL (ref 8.5–10.1)
CHLORIDE SERPL-SCNC: 108 MMOL/L (ref 98–112)
CO2 SERPL-SCNC: 28 MMOL/L (ref 21–32)
CREAT BLD-MCNC: 1.16 MG/DL
DEPRECATED RDW RBC AUTO: 48.7 FL (ref 35.1–46.3)
EOSINOPHIL # BLD AUTO: 0.14 X10(3) UL (ref 0–0.7)
EOSINOPHIL NFR BLD AUTO: 1.5 %
ERYTHROCYTE [DISTWIDTH] IN BLOOD BY AUTOMATED COUNT: 13.3 % (ref 11–15)
FASTING STATUS PATIENT QL REPORTED: NO
GFR SERPLBLD BASED ON 1.73 SQ M-ARVRAT: 50 ML/MIN/1.73M2 (ref 60–?)
GLOBULIN PLAS-MCNC: 3.6 G/DL (ref 2.8–4.4)
GLUCOSE BLD-MCNC: 124 MG/DL (ref 70–99)
HCT VFR BLD AUTO: 37.4 %
HGB BLD-MCNC: 11.8 G/DL
IMM GRANULOCYTES # BLD AUTO: 0.03 X10(3) UL (ref 0–1)
IMM GRANULOCYTES NFR BLD: 0.3 %
LYMPHOCYTES # BLD AUTO: 2.8 X10(3) UL (ref 1–4)
LYMPHOCYTES NFR BLD AUTO: 29.3 %
MCH RBC QN AUTO: 31.3 PG (ref 26–34)
MCHC RBC AUTO-ENTMCNC: 31.6 G/DL (ref 31–37)
MCV RBC AUTO: 99.2 FL
MONOCYTES # BLD AUTO: 0.91 X10(3) UL (ref 0.1–1)
MONOCYTES NFR BLD AUTO: 9.5 %
NEUTROPHILS # BLD AUTO: 5.63 X10 (3) UL (ref 1.5–7.7)
NEUTROPHILS # BLD AUTO: 5.63 X10(3) UL (ref 1.5–7.7)
NEUTROPHILS NFR BLD AUTO: 58.9 %
OSMOLALITY SERPL CALC.SUM OF ELEC: 299 MOSM/KG (ref 275–295)
PLATELET # BLD AUTO: 387 10(3)UL (ref 150–450)
POTASSIUM SERPL-SCNC: 3.4 MMOL/L (ref 3.5–5.1)
PROT SERPL-MCNC: 7.6 G/DL (ref 6.4–8.2)
RBC # BLD AUTO: 3.77 X10(6)UL
SODIUM SERPL-SCNC: 143 MMOL/L (ref 136–145)
WBC # BLD AUTO: 9.6 X10(3) UL (ref 4–11)

## 2023-04-05 PROCEDURE — 85025 COMPLETE CBC W/AUTO DIFF WBC: CPT | Performed by: FAMILY MEDICINE

## 2023-04-05 PROCEDURE — 80053 COMPREHEN METABOLIC PANEL: CPT | Performed by: FAMILY MEDICINE

## 2023-04-05 PROCEDURE — 93010 ELECTROCARDIOGRAM REPORT: CPT | Performed by: INTERNAL MEDICINE

## 2023-04-05 PROCEDURE — 93005 ELECTROCARDIOGRAM TRACING: CPT

## 2023-04-05 PROCEDURE — 36415 COLL VENOUS BLD VENIPUNCTURE: CPT | Performed by: FAMILY MEDICINE

## 2023-04-06 ENCOUNTER — TELEPHONE (OUTPATIENT)
Dept: UROLOGY | Facility: HOSPITAL | Age: 73
End: 2023-04-06

## 2023-04-06 DIAGNOSIS — N95.2 POSTMENOPAUSAL ATROPHIC VAGINITIS: Primary | ICD-10-CM

## 2023-04-06 DIAGNOSIS — N39.41 URGE INCONTINENCE: ICD-10-CM

## 2023-04-06 LAB
ATRIAL RATE: 74 BPM
P AXIS: 46 DEGREES
P-R INTERVAL: 166 MS
Q-T INTERVAL: 408 MS
QRS DURATION: 116 MS
QTC CALCULATION (BEZET): 452 MS
R AXIS: -50 DEGREES
T AXIS: 57 DEGREES
VENTRICULAR RATE: 74 BPM

## 2023-04-06 RX ORDER — SULFAMETHOXAZOLE AND TRIMETHOPRIM 800; 160 MG/1; MG/1
1 TABLET ORAL 2 TIMES DAILY
Qty: 20 TABLET | Refills: 0 | Status: SHIPPED | OUTPATIENT
Start: 2023-04-06 | End: 2023-04-16

## 2023-04-06 NOTE — TELEPHONE ENCOUNTER
Telephone call to patient with test results    Urine culture obtained on 04/04/2023 was positive for >100,000,Klebsiella pneumoniae    Allergies reviewed    Discussed with Dr. Carly Ruvalcaba: Bactrim DS bid po x 10 days    Will send antibiotics to pt's preferred pharmacy. To start course of antibiotics prior to upcoming Botox procedure    All questions answered     Confirmed follow up Botox voiding assessment on 04/17/2023 at 0930 am, sooner prn.     Patient understands and agrees to plan

## 2023-04-10 ENCOUNTER — HOSPITAL ENCOUNTER (OUTPATIENT)
Facility: HOSPITAL | Age: 73
Setting detail: HOSPITAL OUTPATIENT SURGERY
Discharge: HOME | End: 2023-04-10
Attending: OBSTETRICS & GYNECOLOGY | Admitting: OBSTETRICS & GYNECOLOGY
Payer: MEDICARE

## 2023-04-10 ENCOUNTER — ANESTHESIA EVENT (OUTPATIENT)
Dept: SURGERY | Facility: HOSPITAL | Age: 73
End: 2023-04-10
Payer: MEDICARE

## 2023-04-10 ENCOUNTER — ANESTHESIA (OUTPATIENT)
Dept: SURGERY | Facility: HOSPITAL | Age: 73
End: 2023-04-10
Payer: MEDICARE

## 2023-04-10 VITALS
DIASTOLIC BLOOD PRESSURE: 70 MMHG | RESPIRATION RATE: 20 BRPM | OXYGEN SATURATION: 93 % | WEIGHT: 243 LBS | SYSTOLIC BLOOD PRESSURE: 124 MMHG | HEIGHT: 70 IN | BODY MASS INDEX: 34.79 KG/M2 | TEMPERATURE: 98 F | HEART RATE: 66 BPM

## 2023-04-10 DIAGNOSIS — N39.41 URGE INCONTINENCE: ICD-10-CM

## 2023-04-10 DIAGNOSIS — Z01.818 PRE-OP TESTING: Primary | ICD-10-CM

## 2023-04-10 DIAGNOSIS — M79.2 NEUROPATHIC PAIN: ICD-10-CM

## 2023-04-10 LAB — GLUCOSE BLDC GLUCOMTR-MCNC: 118 MG/DL (ref 70–99)

## 2023-04-10 PROCEDURE — 82962 GLUCOSE BLOOD TEST: CPT

## 2023-04-10 PROCEDURE — 3E0K8GC INTRODUCTION OF OTHER THERAPEUTIC SUBSTANCE INTO GENITOURINARY TRACT, VIA NATURAL OR ARTIFICIAL OPENING ENDOSCOPIC: ICD-10-PCS | Performed by: OBSTETRICS & GYNECOLOGY

## 2023-04-10 RX ORDER — HYDROMORPHONE HYDROCHLORIDE 1 MG/ML
0.6 INJECTION, SOLUTION INTRAMUSCULAR; INTRAVENOUS; SUBCUTANEOUS EVERY 5 MIN PRN
Status: DISCONTINUED | OUTPATIENT
Start: 2023-04-10 | End: 2023-04-10

## 2023-04-10 RX ORDER — CEFAZOLIN SODIUM/WATER 2 G/20 ML
2 SYRINGE (ML) INTRAVENOUS ONCE
Status: COMPLETED | OUTPATIENT
Start: 2023-04-10 | End: 2023-04-10

## 2023-04-10 RX ORDER — DEXTROSE MONOHYDRATE 25 G/50ML
50 INJECTION, SOLUTION INTRAVENOUS
Status: DISCONTINUED | OUTPATIENT
Start: 2023-04-10 | End: 2023-04-10 | Stop reason: HOSPADM

## 2023-04-10 RX ORDER — NICOTINE POLACRILEX 4 MG
30 LOZENGE BUCCAL
Status: DISCONTINUED | OUTPATIENT
Start: 2023-04-10 | End: 2023-04-10 | Stop reason: HOSPADM

## 2023-04-10 RX ORDER — EPHEDRINE SULFATE 50 MG/ML
INJECTION INTRAVENOUS AS NEEDED
Status: DISCONTINUED | OUTPATIENT
Start: 2023-04-10 | End: 2023-04-10 | Stop reason: SURG

## 2023-04-10 RX ORDER — HYDROMORPHONE HYDROCHLORIDE 1 MG/ML
0.2 INJECTION, SOLUTION INTRAMUSCULAR; INTRAVENOUS; SUBCUTANEOUS EVERY 5 MIN PRN
Status: DISCONTINUED | OUTPATIENT
Start: 2023-04-10 | End: 2023-04-10

## 2023-04-10 RX ORDER — NICOTINE POLACRILEX 4 MG
15 LOZENGE BUCCAL
Status: DISCONTINUED | OUTPATIENT
Start: 2023-04-10 | End: 2023-04-10

## 2023-04-10 RX ORDER — METOCLOPRAMIDE HYDROCHLORIDE 5 MG/ML
10 INJECTION INTRAMUSCULAR; INTRAVENOUS EVERY 8 HOURS PRN
Status: DISCONTINUED | OUTPATIENT
Start: 2023-04-10 | End: 2023-04-10

## 2023-04-10 RX ORDER — LIDOCAINE HYDROCHLORIDE 20 MG/ML
JELLY TOPICAL AS NEEDED
Status: DISCONTINUED | OUTPATIENT
Start: 2023-04-10 | End: 2023-04-10 | Stop reason: HOSPADM

## 2023-04-10 RX ORDER — HYDROMORPHONE HYDROCHLORIDE 1 MG/ML
0.4 INJECTION, SOLUTION INTRAMUSCULAR; INTRAVENOUS; SUBCUTANEOUS EVERY 5 MIN PRN
Status: DISCONTINUED | OUTPATIENT
Start: 2023-04-10 | End: 2023-04-10

## 2023-04-10 RX ORDER — NICOTINE POLACRILEX 4 MG
30 LOZENGE BUCCAL
Status: DISCONTINUED | OUTPATIENT
Start: 2023-04-10 | End: 2023-04-10

## 2023-04-10 RX ORDER — SODIUM CHLORIDE 9 MG/ML
INJECTION, SOLUTION INTRAVENOUS CONTINUOUS
Status: DISCONTINUED | OUTPATIENT
Start: 2023-04-10 | End: 2023-04-10

## 2023-04-10 RX ORDER — NICOTINE POLACRILEX 4 MG
15 LOZENGE BUCCAL
Status: DISCONTINUED | OUTPATIENT
Start: 2023-04-10 | End: 2023-04-10 | Stop reason: HOSPADM

## 2023-04-10 RX ORDER — ONDANSETRON 2 MG/ML
INJECTION INTRAMUSCULAR; INTRAVENOUS AS NEEDED
Status: DISCONTINUED | OUTPATIENT
Start: 2023-04-10 | End: 2023-04-10 | Stop reason: SURG

## 2023-04-10 RX ORDER — MORPHINE SULFATE 4 MG/ML
4 INJECTION, SOLUTION INTRAMUSCULAR; INTRAVENOUS EVERY 10 MIN PRN
Status: DISCONTINUED | OUTPATIENT
Start: 2023-04-10 | End: 2023-04-10

## 2023-04-10 RX ORDER — FAMOTIDINE 20 MG/1
20 TABLET, FILM COATED ORAL ONCE
Status: COMPLETED | OUTPATIENT
Start: 2023-04-10 | End: 2023-04-10

## 2023-04-10 RX ORDER — ACETAMINOPHEN 500 MG
1000 TABLET ORAL ONCE
Status: COMPLETED | OUTPATIENT
Start: 2023-04-10 | End: 2023-04-10

## 2023-04-10 RX ORDER — NALOXONE HYDROCHLORIDE 0.4 MG/ML
80 INJECTION, SOLUTION INTRAMUSCULAR; INTRAVENOUS; SUBCUTANEOUS AS NEEDED
Status: DISCONTINUED | OUTPATIENT
Start: 2023-04-10 | End: 2023-04-10

## 2023-04-10 RX ORDER — DEXTROSE MONOHYDRATE 25 G/50ML
50 INJECTION, SOLUTION INTRAVENOUS
Status: DISCONTINUED | OUTPATIENT
Start: 2023-04-10 | End: 2023-04-10

## 2023-04-10 RX ORDER — MORPHINE SULFATE 4 MG/ML
2 INJECTION, SOLUTION INTRAMUSCULAR; INTRAVENOUS EVERY 10 MIN PRN
Status: DISCONTINUED | OUTPATIENT
Start: 2023-04-10 | End: 2023-04-10

## 2023-04-10 RX ORDER — ONDANSETRON 2 MG/ML
4 INJECTION INTRAMUSCULAR; INTRAVENOUS EVERY 6 HOURS PRN
Status: DISCONTINUED | OUTPATIENT
Start: 2023-04-10 | End: 2023-04-10

## 2023-04-10 RX ORDER — MORPHINE SULFATE 10 MG/ML
6 INJECTION, SOLUTION INTRAMUSCULAR; INTRAVENOUS EVERY 10 MIN PRN
Status: DISCONTINUED | OUTPATIENT
Start: 2023-04-10 | End: 2023-04-10

## 2023-04-10 RX ORDER — LIDOCAINE HYDROCHLORIDE 10 MG/ML
INJECTION, SOLUTION EPIDURAL; INFILTRATION; INTRACAUDAL; PERINEURAL AS NEEDED
Status: DISCONTINUED | OUTPATIENT
Start: 2023-04-10 | End: 2023-04-10 | Stop reason: SURG

## 2023-04-10 RX ORDER — GLYCOPYRROLATE 0.2 MG/ML
INJECTION, SOLUTION INTRAMUSCULAR; INTRAVENOUS AS NEEDED
Status: DISCONTINUED | OUTPATIENT
Start: 2023-04-10 | End: 2023-04-10 | Stop reason: SURG

## 2023-04-10 RX ORDER — SODIUM CHLORIDE, SODIUM LACTATE, POTASSIUM CHLORIDE, CALCIUM CHLORIDE 600; 310; 30; 20 MG/100ML; MG/100ML; MG/100ML; MG/100ML
INJECTION, SOLUTION INTRAVENOUS CONTINUOUS
Status: DISCONTINUED | OUTPATIENT
Start: 2023-04-10 | End: 2023-04-10

## 2023-04-10 RX ADMIN — GLYCOPYRROLATE 0.2 MG: 0.2 INJECTION, SOLUTION INTRAMUSCULAR; INTRAVENOUS at 09:17:00

## 2023-04-10 RX ADMIN — LIDOCAINE HYDROCHLORIDE 50 MG: 10 INJECTION, SOLUTION EPIDURAL; INFILTRATION; INTRACAUDAL; PERINEURAL at 09:12:00

## 2023-04-10 RX ADMIN — EPHEDRINE SULFATE 5 MG: 50 INJECTION INTRAVENOUS at 09:19:00

## 2023-04-10 RX ADMIN — ONDANSETRON 4 MG: 2 INJECTION INTRAMUSCULAR; INTRAVENOUS at 09:17:00

## 2023-04-10 RX ADMIN — CEFAZOLIN SODIUM/WATER 2 G: 2 G/20 ML SYRINGE (ML) INTRAVENOUS at 09:16:00

## 2023-04-10 NOTE — ANESTHESIA PROCEDURE NOTES
Airway  Date/Time: 4/10/2023 9:14 AM  Urgency: Elective    Airway not difficult    General Information and Staff    Patient location during procedure: OR  Anesthesiologist: Maite Rangel DO  Performed: anesthesiologist   Performed by: Maite Rangel DO  Authorized by: Maite Rangel DO      Indications and Patient Condition  Indications for airway management: anesthesia  Spontaneous Ventilation: absent  Sedation level: deep  Preoxygenated: yes  Patient position: sniffing  Mask difficulty assessment: 1 - vent by mask    Final Airway Details  Final airway type: supraglottic airway      Successful airway: classic  Size 5       Number of attempts at approach: 1  Number of other approaches attempted: 0

## 2023-04-10 NOTE — H&P
68 y/o female with urinary urgency incontinence for surgical mgmt  Wants to stay with 100 unit dose  botox in the past with mixed results  Currently taking abx given recent + ucx    Pre operative clearance with Dr Gen Cabello, pt seen & examined without changes. Thorough discussion of surgical risks, benefits, and alternatives including, but not limited to bleeding/clots, infection, injury to nearby organs (urethra, bladder, ureters, bowel, blood vessels), mesh erosion, dyspareunia, de paul UUI, worsening RAMÓN, recurrence, voiding dysfunction, and pain. Discussed pain mgmt and potential need for narcotics. Discussed addiction potential with narcotics. IL  reviewed. Plan to proceed with cystoscopy with bladder botox injection as consented  All questions answered.    Sixto Malagon  [phone]

## 2023-04-11 ENCOUNTER — TELEPHONE (OUTPATIENT)
Dept: NEUROLOGY | Facility: CLINIC | Age: 73
End: 2023-04-11

## 2023-04-11 ENCOUNTER — TELEPHONE (OUTPATIENT)
Dept: UROLOGY | Facility: CLINIC | Age: 73
End: 2023-04-11

## 2023-04-11 DIAGNOSIS — N95.2 POSTMENOPAUSAL ATROPHIC VAGINITIS: Primary | ICD-10-CM

## 2023-04-11 RX ORDER — ESTRADIOL 0.1 MG/G
CREAM VAGINAL
Qty: 42.5 G | Refills: 3 | Status: SHIPPED | OUTPATIENT
Start: 2023-04-11

## 2023-04-11 RX ORDER — PREGABALIN 300 MG/1
CAPSULE ORAL
Qty: 180 CAPSULE | Refills: 0 | Status: SHIPPED | OUTPATIENT
Start: 2023-04-11

## 2023-04-11 NOTE — TELEPHONE ENCOUNTER
The patient is requesting a refill on:   PREGABALIN 300 MG Oral Cap     Sig: TAKE 1 CAPSULE(300 MG) BY MOUTH TWICE DAILY         Last OV: 12/8/22  Next OV: None  Last refilled: 1/11/23 #180

## 2023-04-11 NOTE — TELEPHONE ENCOUNTER
TC to patient s/p Botox procedure  She denies any pain, denies any sense of voiding dysfunction, denies any bleeding  Denies any shortness of breath or CP  She has appointment on 4/17  She is moving to [place] on 4/20  Instructed to keep appointment on 4/17  She verbalized understanding and had no further questions.

## 2023-04-13 ENCOUNTER — TELEPHONE (OUTPATIENT)
Dept: UROLOGY | Facility: HOSPITAL | Age: 73
End: 2023-04-13

## 2023-04-13 NOTE — TELEPHONE ENCOUNTER
Incoming telephone call received from the patient. Patient states she went to her pharmacy to  her Estrace Cream and the pharmacist     informed her that \"nothing was ready for her to be picked up. \"    Instructed the patient a new order was sent to her pharmacy on 04/11/2023 with confirmation     received. Informed the patient will need to contact the pharmacist and instruct the pharmacist to fill the     prescription as directed. Patient verbalized understanding. Encouraged to call with questions or concerns.  ..

## 2023-04-16 PROBLEM — E11.9 TYPE 2 DIABETES MELLITUS WITHOUT COMPLICATION, WITHOUT LONG-TERM CURRENT USE OF INSULIN (HCC): Status: ACTIVE | Noted: 2023-04-16

## 2023-04-17 ENCOUNTER — OFFICE VISIT (OUTPATIENT)
Dept: UROLOGY | Facility: HOSPITAL | Age: 73
End: 2023-04-17
Attending: PHYSICIAN ASSISTANT
Payer: MEDICARE

## 2023-04-17 VITALS — HEIGHT: 70 IN | WEIGHT: 243 LBS | BODY MASS INDEX: 34.79 KG/M2 | RESPIRATION RATE: 18 BRPM

## 2023-04-17 DIAGNOSIS — N95.2 POSTMENOPAUSAL ATROPHIC VAGINITIS: ICD-10-CM

## 2023-04-17 DIAGNOSIS — R35.1 NOCTURIA: ICD-10-CM

## 2023-04-17 DIAGNOSIS — N39.41 URGE URINARY INCONTINENCE: ICD-10-CM

## 2023-04-17 DIAGNOSIS — N81.84 PELVIC MUSCLE WASTING: ICD-10-CM

## 2023-04-17 DIAGNOSIS — N32.81 DETRUSOR INSTABILITY: Primary | ICD-10-CM

## 2023-04-17 LAB
BLOOD URINE: NEGATIVE
CONTROL RUN WITHIN 24 HOURS?: YES
LEUKOCYTE ESTERASE URINE: NEGATIVE
NITRITE URINE: NEGATIVE

## 2023-04-17 PROCEDURE — 51701 INSERT BLADDER CATHETER: CPT | Performed by: PHYSICIAN ASSISTANT

## 2023-04-17 PROCEDURE — 99212 OFFICE O/P EST SF 10 MIN: CPT

## 2023-04-17 PROCEDURE — 81002 URINALYSIS NONAUTO W/O SCOPE: CPT | Performed by: PHYSICIAN ASSISTANT

## 2023-04-17 PROCEDURE — 87086 URINE CULTURE/COLONY COUNT: CPT | Performed by: PHYSICIAN ASSISTANT

## 2023-05-20 DIAGNOSIS — J45.20 MILD INTERMITTENT ASTHMA WITHOUT COMPLICATION: ICD-10-CM

## 2023-05-22 RX ORDER — MONTELUKAST SODIUM 10 MG/1
TABLET ORAL
Qty: 90 TABLET | Refills: 0 | Status: SHIPPED | OUTPATIENT
Start: 2023-05-22

## 2023-05-26 RX ORDER — LORATADINE 10 MG/1
TABLET ORAL
Qty: 30 TABLET | Refills: 1 | Status: SHIPPED | OUTPATIENT
Start: 2023-05-26

## 2023-07-22 DIAGNOSIS — J45.20 MILD INTERMITTENT ASTHMA WITHOUT COMPLICATION: ICD-10-CM

## 2023-07-24 RX ORDER — MONTELUKAST SODIUM 10 MG/1
10 TABLET ORAL DAILY
Qty: 90 TABLET | Refills: 0 | Status: SHIPPED | OUTPATIENT
Start: 2023-07-24

## 2023-11-22 RX ORDER — DILTIAZEM HYDROCHLORIDE 300 MG/1
300 CAPSULE, EXTENDED RELEASE ORAL DAILY
Qty: 90 CAPSULE | Refills: 0 | OUTPATIENT
Start: 2023-11-22

## 2024-01-16 RX ORDER — DILTIAZEM HYDROCHLORIDE 300 MG/1
300 CAPSULE, EXTENDED RELEASE ORAL DAILY
Qty: 90 CAPSULE | Refills: 0 | OUTPATIENT
Start: 2024-01-16

## 2024-01-29 RX ORDER — DILTIAZEM HYDROCHLORIDE 300 MG/1
300 CAPSULE, EXTENDED RELEASE ORAL DAILY
Qty: 90 CAPSULE | Refills: 0 | OUTPATIENT
Start: 2024-01-29

## 2024-03-28 RX ORDER — DILTIAZEM HYDROCHLORIDE 300 MG/1
300 CAPSULE, EXTENDED RELEASE ORAL DAILY
Qty: 90 CAPSULE | Refills: 0 | OUTPATIENT
Start: 2024-03-28
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
## 2025-01-07 ENCOUNTER — LAB REQUISITION (OUTPATIENT)
Dept: LAB | Facility: CLINIC | Age: 75
End: 2025-01-07
Payer: COMMERCIAL

## 2025-01-07 DIAGNOSIS — Z11.1 ENCOUNTER FOR SCREENING FOR RESPIRATORY TUBERCULOSIS: ICD-10-CM

## 2025-01-08 PROCEDURE — 36415 COLL VENOUS BLD VENIPUNCTURE: CPT | Mod: ORL | Performed by: INTERNAL MEDICINE

## 2025-01-08 PROCEDURE — 86481 TB AG RESPONSE T-CELL SUSP: CPT | Mod: ORL | Performed by: INTERNAL MEDICINE

## 2025-01-09 LAB
QUANTIFERON MITOGEN: 1.39 IU/ML
QUANTIFERON NIL TUBE: 0.01 IU/ML
QUANTIFERON TB2 TUBE: 0.05

## 2025-01-10 LAB
GAMMA INTERFERON BACKGROUND BLD IA-ACNC: 0.01 IU/ML
M TB IFN-G BLD-IMP: NEGATIVE
M TB IFN-G CD4+ BCKGRND COR BLD-ACNC: 1.38 IU/ML
MITOGEN IGNF BCKGRD COR BLD-ACNC: 0.03 IU/ML
MITOGEN IGNF BCKGRD COR BLD-ACNC: 0.04 IU/ML
QUANTIFERON TB1 TUBE: 0.04 IU/ML

## 2025-01-14 ENCOUNTER — LAB REQUISITION (OUTPATIENT)
Dept: LAB | Facility: CLINIC | Age: 75
End: 2025-01-14
Payer: COMMERCIAL

## 2025-01-14 DIAGNOSIS — I10 ESSENTIAL (PRIMARY) HYPERTENSION: ICD-10-CM

## 2025-01-14 DIAGNOSIS — D64.9 ANEMIA, UNSPECIFIED: ICD-10-CM

## 2025-01-15 ENCOUNTER — LAB REQUISITION (OUTPATIENT)
Dept: LAB | Facility: CLINIC | Age: 75
End: 2025-01-15
Payer: COMMERCIAL

## 2025-01-15 DIAGNOSIS — Z98.1 ARTHRODESIS STATUS: ICD-10-CM

## 2025-01-15 LAB
ANION GAP SERPL CALCULATED.3IONS-SCNC: 11 MMOL/L (ref 7–15)
BUN SERPL-MCNC: 20.9 MG/DL (ref 8–23)
CALCIUM SERPL-MCNC: 9.7 MG/DL (ref 8.8–10.4)
CHLORIDE SERPL-SCNC: 98 MMOL/L (ref 98–107)
CREAT SERPL-MCNC: 1.08 MG/DL (ref 0.51–0.95)
EGFRCR SERPLBLD CKD-EPI 2021: 53 ML/MIN/1.73M2
ERYTHROCYTE [DISTWIDTH] IN BLOOD BY AUTOMATED COUNT: 13.1 % (ref 10–15)
GLUCOSE SERPL-MCNC: 105 MG/DL (ref 70–99)
HCO3 SERPL-SCNC: 28 MMOL/L (ref 22–29)
HCT VFR BLD AUTO: 29.1 % (ref 35–47)
HGB BLD-MCNC: 9.3 G/DL (ref 11.7–15.7)
MCH RBC QN AUTO: 31.2 PG (ref 26.5–33)
MCHC RBC AUTO-ENTMCNC: 32 G/DL (ref 31.5–36.5)
MCV RBC AUTO: 98 FL (ref 78–100)
PLATELET # BLD AUTO: 573 10E3/UL (ref 150–450)
POTASSIUM SERPL-SCNC: 3.5 MMOL/L (ref 3.4–5.3)
RBC # BLD AUTO: 2.98 10E6/UL (ref 3.8–5.2)
SODIUM SERPL-SCNC: 137 MMOL/L (ref 135–145)
WBC # BLD AUTO: 9.7 10E3/UL (ref 4–11)

## 2025-01-15 PROCEDURE — 36415 COLL VENOUS BLD VENIPUNCTURE: CPT | Mod: ORL | Performed by: INTERNAL MEDICINE

## 2025-01-15 PROCEDURE — 85027 COMPLETE CBC AUTOMATED: CPT | Mod: ORL | Performed by: INTERNAL MEDICINE

## 2025-01-15 PROCEDURE — 80048 BASIC METABOLIC PNL TOTAL CA: CPT | Mod: ORL | Performed by: INTERNAL MEDICINE

## 2025-01-16 LAB
ANION GAP SERPL CALCULATED.3IONS-SCNC: 13 MMOL/L (ref 7–15)
BUN SERPL-MCNC: 18.7 MG/DL (ref 8–23)
CALCIUM SERPL-MCNC: 9.7 MG/DL (ref 8.8–10.4)
CHLORIDE SERPL-SCNC: 97 MMOL/L (ref 98–107)
CREAT SERPL-MCNC: 0.89 MG/DL (ref 0.51–0.95)
CRP SERPL HS-MCNC: 99.2 MG/L
EGFRCR SERPLBLD CKD-EPI 2021: 67 ML/MIN/1.73M2
ERYTHROCYTE [DISTWIDTH] IN BLOOD BY AUTOMATED COUNT: 13 % (ref 10–15)
GLUCOSE SERPL-MCNC: 104 MG/DL (ref 70–99)
HCO3 SERPL-SCNC: 26 MMOL/L (ref 22–29)
HCT VFR BLD AUTO: 29.1 % (ref 35–47)
HGB BLD-MCNC: 9.4 G/DL (ref 11.7–15.7)
MCH RBC QN AUTO: 31.4 PG (ref 26.5–33)
MCHC RBC AUTO-ENTMCNC: 32.3 G/DL (ref 31.5–36.5)
MCV RBC AUTO: 97 FL (ref 78–100)
PLATELET # BLD AUTO: 585 10E3/UL (ref 150–450)
POTASSIUM SERPL-SCNC: 3.7 MMOL/L (ref 3.4–5.3)
RBC # BLD AUTO: 2.99 10E6/UL (ref 3.8–5.2)
SODIUM SERPL-SCNC: 136 MMOL/L (ref 135–145)
WBC # BLD AUTO: 9.6 10E3/UL (ref 4–11)

## 2025-01-16 PROCEDURE — 36415 COLL VENOUS BLD VENIPUNCTURE: CPT | Mod: ORL | Performed by: INTERNAL MEDICINE

## 2025-01-16 PROCEDURE — 80048 BASIC METABOLIC PNL TOTAL CA: CPT | Mod: ORL | Performed by: INTERNAL MEDICINE

## 2025-01-16 PROCEDURE — 85027 COMPLETE CBC AUTOMATED: CPT | Mod: ORL | Performed by: INTERNAL MEDICINE

## 2025-01-16 PROCEDURE — 86141 C-REACTIVE PROTEIN HS: CPT | Mod: ORL | Performed by: INTERNAL MEDICINE

## 2025-01-22 ENCOUNTER — APPOINTMENT (OUTPATIENT)
Dept: CT IMAGING | Facility: CLINIC | Age: 75
End: 2025-01-22
Attending: EMERGENCY MEDICINE
Payer: COMMERCIAL

## 2025-01-22 ENCOUNTER — HOSPITAL ENCOUNTER (EMERGENCY)
Facility: CLINIC | Age: 75
Discharge: OTHER INSTITUTION | End: 2025-01-23
Attending: EMERGENCY MEDICINE
Payer: COMMERCIAL

## 2025-01-22 DIAGNOSIS — T81.89XA LUMBAR SURGICAL WOUND FLUID COLLECTION, INITIAL ENCOUNTER: ICD-10-CM

## 2025-01-22 DIAGNOSIS — M54.50 LOW BACK PAIN, UNSPECIFIED BACK PAIN LATERALITY, UNSPECIFIED CHRONICITY, UNSPECIFIED WHETHER SCIATICA PRESENT: ICD-10-CM

## 2025-01-22 DIAGNOSIS — T88.8XXA FLUID COLLECTION AT SURGICAL SITE, INITIAL ENCOUNTER: ICD-10-CM

## 2025-01-22 LAB
ALBUMIN UR-MCNC: NEGATIVE MG/DL
ANION GAP SERPL CALCULATED.3IONS-SCNC: 18 MMOL/L (ref 7–15)
APPEARANCE UR: CLEAR
BASOPHILS # BLD AUTO: 0 10E3/UL (ref 0–0.2)
BASOPHILS NFR BLD AUTO: 0 %
BILIRUB UR QL STRIP: NEGATIVE
BUN SERPL-MCNC: 25.4 MG/DL (ref 8–23)
CALCIUM SERPL-MCNC: 10 MG/DL (ref 8.8–10.4)
CHLORIDE SERPL-SCNC: 94 MMOL/L (ref 98–107)
COLOR UR AUTO: YELLOW
CREAT SERPL-MCNC: 1.06 MG/DL (ref 0.51–0.95)
EGFRCR SERPLBLD CKD-EPI 2021: 55 ML/MIN/1.73M2
EOSINOPHIL # BLD AUTO: 0 10E3/UL (ref 0–0.7)
EOSINOPHIL NFR BLD AUTO: 0 %
ERYTHROCYTE [DISTWIDTH] IN BLOOD BY AUTOMATED COUNT: 13 % (ref 10–15)
GLUCOSE BLDC GLUCOMTR-MCNC: 161 MG/DL (ref 70–99)
GLUCOSE SERPL-MCNC: 158 MG/DL (ref 70–99)
GLUCOSE UR STRIP-MCNC: >=1000 MG/DL
HCO3 SERPL-SCNC: 24 MMOL/L (ref 22–29)
HCT VFR BLD AUTO: 32.4 % (ref 35–47)
HGB BLD-MCNC: 10.2 G/DL (ref 11.7–15.7)
HGB UR QL STRIP: NEGATIVE
HOLD SPECIMEN: NORMAL
IMM GRANULOCYTES # BLD: 0.1 10E3/UL
IMM GRANULOCYTES NFR BLD: 1 %
KETONES UR STRIP-MCNC: NEGATIVE MG/DL
LEUKOCYTE ESTERASE UR QL STRIP: NEGATIVE
LYMPHOCYTES # BLD AUTO: 1.6 10E3/UL (ref 0.8–5.3)
LYMPHOCYTES NFR BLD AUTO: 12 %
MCH RBC QN AUTO: 30.5 PG (ref 26.5–33)
MCHC RBC AUTO-ENTMCNC: 31.5 G/DL (ref 31.5–36.5)
MCV RBC AUTO: 97 FL (ref 78–100)
MONOCYTES # BLD AUTO: 1.1 10E3/UL (ref 0–1.3)
MONOCYTES NFR BLD AUTO: 9 %
NEUTROPHILS # BLD AUTO: 10.3 10E3/UL (ref 1.6–8.3)
NEUTROPHILS NFR BLD AUTO: 78 %
NITRATE UR QL: NEGATIVE
NRBC # BLD AUTO: 0 10E3/UL
NRBC BLD AUTO-RTO: 0 /100
PH UR STRIP: 5.5 [PH] (ref 5–7)
PLATELET # BLD AUTO: 677 10E3/UL (ref 150–450)
POTASSIUM SERPL-SCNC: 4.4 MMOL/L (ref 3.4–5.3)
RBC # BLD AUTO: 3.34 10E6/UL (ref 3.8–5.2)
RBC URINE: 1 /HPF
SODIUM SERPL-SCNC: 136 MMOL/L (ref 135–145)
SP GR UR STRIP: 1.03 (ref 1–1.03)
SQUAMOUS EPITHELIAL: <1 /HPF
UROBILINOGEN UR STRIP-MCNC: 3 MG/DL
WBC # BLD AUTO: 13.2 10E3/UL (ref 4–11)
WBC URINE: 1 /HPF

## 2025-01-22 PROCEDURE — 96374 THER/PROPH/DIAG INJ IV PUSH: CPT

## 2025-01-22 PROCEDURE — 81001 URINALYSIS AUTO W/SCOPE: CPT | Performed by: EMERGENCY MEDICINE

## 2025-01-22 PROCEDURE — 80048 BASIC METABOLIC PNL TOTAL CA: CPT | Performed by: EMERGENCY MEDICINE

## 2025-01-22 PROCEDURE — 85014 HEMATOCRIT: CPT | Performed by: EMERGENCY MEDICINE

## 2025-01-22 PROCEDURE — 96375 TX/PRO/DX INJ NEW DRUG ADDON: CPT

## 2025-01-22 PROCEDURE — 72131 CT LUMBAR SPINE W/O DYE: CPT

## 2025-01-22 PROCEDURE — 250N000011 HC RX IP 250 OP 636: Performed by: EMERGENCY MEDICINE

## 2025-01-22 PROCEDURE — 250N000013 HC RX MED GY IP 250 OP 250 PS 637: Performed by: EMERGENCY MEDICINE

## 2025-01-22 PROCEDURE — 96376 TX/PRO/DX INJ SAME DRUG ADON: CPT

## 2025-01-22 PROCEDURE — 85025 COMPLETE CBC W/AUTO DIFF WBC: CPT | Performed by: EMERGENCY MEDICINE

## 2025-01-22 PROCEDURE — 99285 EMERGENCY DEPT VISIT HI MDM: CPT | Mod: 25

## 2025-01-22 PROCEDURE — 82962 GLUCOSE BLOOD TEST: CPT

## 2025-01-22 PROCEDURE — 36415 COLL VENOUS BLD VENIPUNCTURE: CPT | Performed by: EMERGENCY MEDICINE

## 2025-01-22 RX ORDER — ACETAMINOPHEN 500 MG
1000 TABLET ORAL ONCE
Status: COMPLETED | OUTPATIENT
Start: 2025-01-22 | End: 2025-01-22

## 2025-01-22 RX ORDER — LOSARTAN POTASSIUM 50 MG/1
50 TABLET ORAL DAILY
Status: DISCONTINUED | OUTPATIENT
Start: 2025-01-23 | End: 2025-01-23 | Stop reason: HOSPADM

## 2025-01-22 RX ORDER — HYDROCHLOROTHIAZIDE 12.5 MG/1
12.5 CAPSULE ORAL DAILY
Status: DISCONTINUED | OUTPATIENT
Start: 2025-01-23 | End: 2025-01-23 | Stop reason: HOSPADM

## 2025-01-22 RX ORDER — PREGABALIN 75 MG/1
150 CAPSULE ORAL 2 TIMES DAILY
Status: DISCONTINUED | OUTPATIENT
Start: 2025-01-22 | End: 2025-01-23 | Stop reason: HOSPADM

## 2025-01-22 RX ORDER — MONTELUKAST SODIUM 10 MG/1
10 TABLET ORAL AT BEDTIME
Status: DISCONTINUED | OUTPATIENT
Start: 2025-01-22 | End: 2025-01-23 | Stop reason: HOSPADM

## 2025-01-22 RX ORDER — DEXAMETHASONE SODIUM PHOSPHATE 10 MG/ML
10 INJECTION, SOLUTION INTRAMUSCULAR; INTRAVENOUS EVERY 6 HOURS
Status: COMPLETED | OUTPATIENT
Start: 2025-01-23 | End: 2025-01-23

## 2025-01-22 RX ORDER — DEXAMETHASONE SODIUM PHOSPHATE 10 MG/ML
10 INJECTION, SOLUTION INTRAMUSCULAR; INTRAVENOUS ONCE
Status: COMPLETED | OUTPATIENT
Start: 2025-01-22 | End: 2025-01-22

## 2025-01-22 RX ORDER — VENLAFAXINE 37.5 MG/1
37.5 TABLET ORAL
Status: DISCONTINUED | OUTPATIENT
Start: 2025-01-23 | End: 2025-01-23 | Stop reason: HOSPADM

## 2025-01-22 RX ORDER — HYDROMORPHONE HYDROCHLORIDE 1 MG/ML
0.5 INJECTION, SOLUTION INTRAMUSCULAR; INTRAVENOUS; SUBCUTANEOUS EVERY 30 MIN PRN
Status: DISCONTINUED | OUTPATIENT
Start: 2025-01-22 | End: 2025-01-22

## 2025-01-22 RX ADMIN — HYDROMORPHONE HYDROCHLORIDE 0.5 MG: 1 INJECTION, SOLUTION INTRAMUSCULAR; INTRAVENOUS; SUBCUTANEOUS at 14:59

## 2025-01-22 RX ADMIN — DEXAMETHASONE SODIUM PHOSPHATE 10 MG: 10 INJECTION, SOLUTION INTRAMUSCULAR; INTRAVENOUS at 23:59

## 2025-01-22 RX ADMIN — DEXAMETHASONE SODIUM PHOSPHATE 10 MG: 10 INJECTION, SOLUTION INTRAMUSCULAR; INTRAVENOUS at 17:54

## 2025-01-22 RX ADMIN — HYDROMORPHONE HYDROCHLORIDE 0.5 MG: 1 INJECTION, SOLUTION INTRAMUSCULAR; INTRAVENOUS; SUBCUTANEOUS at 17:54

## 2025-01-22 RX ADMIN — MONTELUKAST 10 MG: 10 TABLET, FILM COATED ORAL at 23:59

## 2025-01-22 ASSESSMENT — ACTIVITIES OF DAILY LIVING (ADL)
ADLS_ACUITY_SCORE: 41

## 2025-01-22 ASSESSMENT — COLUMBIA-SUICIDE SEVERITY RATING SCALE - C-SSRS
2. HAVE YOU ACTUALLY HAD ANY THOUGHTS OF KILLING YOURSELF IN THE PAST MONTH?: NO
6. HAVE YOU EVER DONE ANYTHING, STARTED TO DO ANYTHING, OR PREPARED TO DO ANYTHING TO END YOUR LIFE?: NO
1. IN THE PAST MONTH, HAVE YOU WISHED YOU WERE DEAD OR WISHED YOU COULD GO TO SLEEP AND NOT WAKE UP?: NO
1. IN THE PAST MONTH, HAVE YOU WISHED YOU WERE DEAD OR WISHED YOU COULD GO TO SLEEP AND NOT WAKE UP?: NO
2. HAVE YOU ACTUALLY HAD ANY THOUGHTS OF KILLING YOURSELF IN THE PAST MONTH?: NO

## 2025-01-22 NOTE — ED PROVIDER NOTES
Emergency Department Note      History of Present Illness     Chief Complaint   Post-op Problem      HPI   Rosalie Millard is a 74 year old female who presents to the ER for evaluation of worsening low back pain over the past few days.  She had lumbar surgery a few weeks ago and is currently residing in a nursing home.  She is taking oxycodone for pain control as well as Tylenol and pregabalin.  Last time she was able to walk was before the weekend and she reports that her current inability to walk is related to pain not because of new weakness or numbness in her legs.  She has chronic urinary incontinence.  She denies blood thinner use. NO fever. NO falls or back trama that is new.         Review of External Notes   I reviewed the discharge summary on 1/7/2025 describing admission for lumbar spinal fusion and decompression/laminectomy due to history of spondylolisthesis of the lumbar region and lumbar stenosis with neurogenic claudication    Past Medical History     Medical History and Problem List   No past medical history on file.    Medications   No current outpatient medications on file.      Surgical History   No past surgical history on file.    Physical Exam     Patient Vitals for the past 24 hrs:   BP Temp Temp src Pulse Resp SpO2 Height Weight   01/22/25 1917 -- -- -- -- -- 100 % -- --   01/22/25 1916 -- -- -- -- -- 100 % -- --   01/22/25 1914 -- -- -- -- -- 100 % -- --   01/22/25 1913 -- -- -- -- -- 100 % -- --   01/22/25 1912 -- -- -- -- -- 100 % -- --   01/22/25 1910 -- -- -- -- -- 100 % -- --   01/22/25 1909 -- -- -- -- -- 99 % -- --   01/22/25 1908 -- -- -- -- -- 100 % -- --   01/22/25 1906 -- -- -- -- -- 99 % -- --   01/22/25 1905 -- -- -- -- -- 99 % -- --   01/22/25 1904 -- -- -- -- -- 100 % -- --   01/22/25 1902 -- -- -- -- -- 100 % -- --   01/22/25 1901 -- -- -- -- -- 100 % -- --   01/22/25 1900 -- -- -- -- -- 97 % -- --   01/22/25 1836 -- -- -- -- -- 96 % -- --   01/22/25 1835 -- -- -- -- -- 99 %  "-- --   01/22/25 1834 -- -- -- -- -- 99 % -- --   01/22/25 1832 -- -- -- -- -- 99 % -- --   01/22/25 1831 -- -- -- -- -- 100 % -- --   01/22/25 1830 -- -- -- -- -- 100 % -- --   01/22/25 1828 -- -- -- -- -- 99 % -- --   01/22/25 1827 -- -- -- -- -- 98 % -- --   01/22/25 1730 122/67 -- -- 83 -- -- -- --   01/22/25 1726 -- -- -- -- -- 99 % -- --   01/22/25 1724 -- -- -- -- -- 100 % -- --   01/22/25 1723 -- -- -- -- -- 99 % -- --   01/22/25 1704 -- -- -- -- -- 100 % -- --   01/22/25 1702 -- -- -- -- -- 100 % -- --   01/22/25 1701 -- -- -- -- -- 99 % -- --   01/22/25 1700 119/71 -- -- 82 -- -- -- --   01/22/25 1627 -- -- -- -- -- 94 % -- --   01/22/25 1626 -- -- -- -- -- 95 % -- --   01/22/25 1625 -- -- -- -- -- 94 % -- --   01/22/25 1624 -- -- -- -- -- 94 % -- --   01/22/25 1623 -- -- -- -- -- 95 % -- --   01/22/25 1622 -- -- -- -- -- 95 % -- --   01/22/25 1620 -- -- -- -- -- 94 % -- --   01/22/25 1615 127/63 -- -- 82 -- 93 % -- --   01/22/25 1607 -- -- -- -- -- 94 % -- --   01/22/25 1606 -- -- -- -- -- 94 % -- --   01/22/25 1605 -- -- -- -- -- 95 % -- --   01/22/25 1604 -- -- -- -- -- 94 % -- --   01/22/25 1602 -- -- -- -- -- 94 % -- --   01/22/25 1601 -- -- -- -- -- 94 % -- --   01/22/25 1600 133/68 -- -- 85 -- 96 % -- --   01/22/25 1544 -- -- -- -- -- 97 % -- --   01/22/25 1543 -- -- -- -- -- 98 % -- --   01/22/25 1541 -- -- -- -- -- 97 % -- --   01/22/25 1540 -- -- -- -- -- 98 % -- --   01/22/25 1539 -- -- -- -- -- 99 % -- --   01/22/25 1538 -- -- -- -- -- 97 % -- --   01/22/25 1537 -- -- -- -- -- 96 % -- --   01/22/25 1536 -- -- -- -- -- 98 % -- --   01/22/25 1534 -- -- -- -- -- 98 % -- --   01/22/25 1533 111/71 -- -- 83 -- 98 % -- --   01/22/25 1445 115/72 -- -- 86 -- 98 % -- --   01/22/25 1430 118/63 -- -- 82 -- 100 % -- --   01/22/25 1429 114/66 97.7  F (36.5  C) Oral 80 20 100 % 1.778 m (5' 10\") 108 kg (238 lb 1.6 oz)     Physical Exam  VS: Reviewed per above  HENT: Mucous membranes moist  EYES: sclera " anicteric  CV: Rate as noted,  regular rhythm.   RESP: Effort normal. Breath sounds are normal bilaterally.  GI: no focal tenderness/rebound/guarding, not distended.  NEURO: Alert, moving all extremities  MSK: No deformity of the extremities  SKIN: Warm and dry, midline vertical lumbar surgical incision well approximated without surrounding erythema. Scant yellow color to overlying bandage but no blood.       Diagnostics     Lab Results   Labs Ordered and Resulted from Time of ED Arrival to Time of ED Departure   BASIC METABOLIC PANEL - Abnormal       Result Value    Sodium 136      Potassium 4.4      Chloride 94 (*)     Carbon Dioxide (CO2) 24      Anion Gap 18 (*)     Urea Nitrogen 25.4 (*)     Creatinine 1.06 (*)     GFR Estimate 55 (*)     Calcium 10.0      Glucose 158 (*)    ROUTINE UA WITH MICROSCOPIC REFLEX TO CULTURE - Abnormal    Color Urine Yellow      Appearance Urine Clear      Glucose Urine >=1000 (*)     Bilirubin Urine Negative      Ketones Urine Negative      Specific Gravity Urine 1.032      Blood Urine Negative      pH Urine 5.5      Protein Albumin Urine Negative      Urobilinogen Urine 3.0 (*)     Nitrite Urine Negative      Leukocyte Esterase Urine Negative      RBC Urine 1      WBC Urine 1      Squamous Epithelials Urine <1     CBC WITH PLATELETS AND DIFFERENTIAL - Abnormal    WBC Count 13.2 (*)     RBC Count 3.34 (*)     Hemoglobin 10.2 (*)     Hematocrit 32.4 (*)     MCV 97      MCH 30.5      MCHC 31.5      RDW 13.0      Platelet Count 677 (*)     % Neutrophils 78      % Lymphocytes 12      % Monocytes 9      % Eosinophils 0      % Basophils 0      % Immature Granulocytes 1      NRBCs per 100 WBC 0      Absolute Neutrophils 10.3 (*)     Absolute Lymphocytes 1.6      Absolute Monocytes 1.1      Absolute Eosinophils 0.0      Absolute Basophils 0.0      Absolute Immature Granulocytes 0.1      Absolute NRBCs 0.0         Imaging   CT Lumbar Spine w/o Contrast   Final Result   IMPRESSION:   1.   Posterior fusion hardware extends from L2 through S1 with bilateral pelvic anchoring screws.   2.  Large fluid collection in the dorsal paraspinal soft tissues in the operative bed most likely represents seroma. No obvious mass effect upon the thecal sac although evaluation of this is significantly limited on a noncontrast CT. MRI may be helpful    for further characterization if this is clinical concern.   3.  No definite high-grade spinal canal stenosis.   4.  Multilevel neural foraminal stenosis, up to severe on the right at L4-L5.               ED Course      Medications Administered   Medications   diltiazem ER COATED BEADS (CARDIZEM CD/CARTIA XT) 24 hr capsule 300 mg (has no administration in time range)   apixaban ANTICOAGULANT (ELIQUIS) tablet 5 mg ( Oral Automatically Held 1/25/25 2000)   flecainide (TAMBOCOR) tablet 150 mg (has no administration in time range)   hydrochlorothiazide (MICROZIDE) capsule 12.5 mg (has no administration in time range)   empagliflozin (JARDIANCE) tablet 25 mg (has no administration in time range)   metFORMIN (GLUCOPHAGE) tablet 1,000 mg (has no administration in time range)   losartan (COZAAR) tablet 50 mg (has no administration in time range)   montelukast (SINGULAIR) tablet 10 mg (has no administration in time range)   pregabalin (LYRICA) capsule 150 mg (has no administration in time range)   venlafaxine (EFFEXOR) tablet 37.5 mg (has no administration in time range)   acetaminophen (TYLENOL) tablet 1,000 mg (1,000 mg Oral Not Given 1/22/25 1459)   dexAMETHasone PF (DECADRON) injection 10 mg (10 mg Intravenous $Given 1/22/25 1754)       Procedures   Procedures       ED Course   ED Course as of 01/22/25 1938 Wed Jan 22, 2025   1729 I spoke with- [facility] spine team   I spoke with REMY Berg  I spoke with Dr Carbajal  I spoke with MRI technician x2      Medical Decision Making / Diagnosis       ALY   Rosalie Millard is a 74 year old female who presents to  the ER for evaluation of worsening low back pain in the setting of lumbosacral surgery on 12/31/2024 with [facility] spine at [facility].  Patient tells us that her pain is worsened over the past 6 days and is no longer ambulating at her living facility.  Vital signs are reassuring.  She is moving both legs on the gurney although strength testing is limited due to effort versus discomfort in her low back.  The low back surgical incision site is without surrounding redness or warmth or clear dehiscence.  There is some yellow hue to the overlying bandage, question drainage of seroma.  CT lumbar spine reveals fluid collection in the soft tissues in this region which radiology felt was likely a seroma.  Discussed with neurosurgery here at the [facility] as well as the patient's [facility] spine team who both recommended MRI. [place] spine provider recommended decadron 10mg q6hr x4.  Unfortunately due to the MRI system we have at the [facility], MRI could not be performed in the setting of the patient's stimulator.  At signout to my colleague, patient was on a wait list [facility] for transfer where she can get further spine cares and MRI.  Her MAR was reviewed and her PTA medications were ordered.    Disposition   Care of the patient was transferred to my colleague pending xfer to ANW.     Diagnosis     ICD-10-CM    1. Lumbar surgical wound fluid collection, initial encounter  T81.89XA       2. Low back pain, unspecified back pain laterality, unspecified chronicity, unspecified whether sciatica present  M54.50            Discharge Medications   New Prescriptions    No medications on file          Hardik Roach MD  01/22/25 1942

## 2025-01-22 NOTE — ED TRIAGE NOTES
Patient brought in by EMS s/p Posterior Spine Fusions L2 to: Ilium on 12/31/2024 for increased pain affecting mobility & bearing weight since Friday that has gotten worse.  Patient request facility to send her to hospital for pain control.  Per report facility not concerned for infection of surgical site.       Triage Assessment (Adult)       Row Name 01/22/25 1431          Triage Assessment    Airway WDL WDL        Respiratory WDL    Respiratory WDL WDL        Cardiac WDL    Cardiac WDL WDL

## 2025-01-22 NOTE — PROGRESS NOTES
Neurosurgery telephone note    Ms. Millard is a 74-year-old female who underwent L2 to ilium fusion, TLIF L4-S1, decompression laminectomy L2-S1 by [facility] spine Center on 12/31/2024, with Dr. Reyes and Dr. Ray.  She presented to the [facility] due to back pain.    ER physician reports patient is grossly neurologically intact in her lower extremities.  No fever reported.    White blood cell count elevated at 13.2, 6 days ago was 9.6.    ICU note from 1/16/2025 reported that she has had drainage from her incision.    ER physician noted that the incision appeared grossly intact, but that was there was perhaps some yellowish drainage.    Plan    Reach out to primary surgery team to inform them of the patient presenting to ER.  Would strongly recommend patient be evaluated by her primary team, and potentially transferred to a facility where they can evaluate her if needed.    Obtain lumbar MRI to evaluate fluid collection     Further recommendations to follow imaging review.     Closely inspect incision for any dehiscence or drainage.    Reviewed with Dr. Amaro

## 2025-01-22 NOTE — ED NOTES
MRI cannot complete scan until patient can find the specific application to control the device and provide the device/model number. Patient is unable to find this information on her phone. ED MD Roach notified of delay.

## 2025-01-23 VITALS
BODY MASS INDEX: 34.09 KG/M2 | HEART RATE: 80 BPM | HEIGHT: 70 IN | DIASTOLIC BLOOD PRESSURE: 60 MMHG | OXYGEN SATURATION: 92 % | SYSTOLIC BLOOD PRESSURE: 117 MMHG | RESPIRATION RATE: 20 BRPM | TEMPERATURE: 97.7 F | WEIGHT: 238.1 LBS

## 2025-01-23 LAB — GLUCOSE BLDC GLUCOMTR-MCNC: 166 MG/DL (ref 70–99)

## 2025-01-23 PROCEDURE — 96375 TX/PRO/DX INJ NEW DRUG ADDON: CPT

## 2025-01-23 PROCEDURE — 250N000011 HC RX IP 250 OP 636: Performed by: EMERGENCY MEDICINE

## 2025-01-23 PROCEDURE — 250N000013 HC RX MED GY IP 250 OP 250 PS 637: Performed by: EMERGENCY MEDICINE

## 2025-01-23 PROCEDURE — 96374 THER/PROPH/DIAG INJ IV PUSH: CPT

## 2025-01-23 PROCEDURE — 250N000011 HC RX IP 250 OP 636: Mod: JW | Performed by: EMERGENCY MEDICINE

## 2025-01-23 PROCEDURE — 96376 TX/PRO/DX INJ SAME DRUG ADON: CPT

## 2025-01-23 PROCEDURE — 82962 GLUCOSE BLOOD TEST: CPT

## 2025-01-23 RX ORDER — HYDROMORPHONE HYDROCHLORIDE 1 MG/ML
0.3 INJECTION, SOLUTION INTRAMUSCULAR; INTRAVENOUS; SUBCUTANEOUS ONCE
Status: COMPLETED | OUTPATIENT
Start: 2025-01-23 | End: 2025-01-23

## 2025-01-23 RX ADMIN — VENLAFAXINE 37.5 MG: 37.5 TABLET ORAL at 09:24

## 2025-01-23 RX ADMIN — HYDROMORPHONE HYDROCHLORIDE 0.3 MG: 1 INJECTION, SOLUTION INTRAMUSCULAR; INTRAVENOUS; SUBCUTANEOUS at 12:33

## 2025-01-23 RX ADMIN — PREGABALIN 150 MG: 75 CAPSULE ORAL at 09:21

## 2025-01-23 RX ADMIN — DILTIAZEM HYDROCHLORIDE 300 MG: 180 CAPSULE, COATED, EXTENDED RELEASE ORAL at 09:33

## 2025-01-23 RX ADMIN — METFORMIN HYDROCHLORIDE 1000 MG: 500 TABLET ORAL at 09:27

## 2025-01-23 RX ADMIN — HYDROCHLOROTHIAZIDE 12.5 MG: 12.5 CAPSULE ORAL at 09:25

## 2025-01-23 RX ADMIN — EMPAGLIFLOZIN 25 MG: 25 TABLET, FILM COATED ORAL at 09:36

## 2025-01-23 RX ADMIN — FLECAINIDE ACETATE 150 MG: 100 TABLET ORAL at 00:07

## 2025-01-23 RX ADMIN — FLECAINIDE ACETATE 150 MG: 100 TABLET ORAL at 09:30

## 2025-01-23 RX ADMIN — DEXAMETHASONE SODIUM PHOSPHATE 10 MG: 10 INJECTION, SOLUTION INTRAMUSCULAR; INTRAVENOUS at 12:38

## 2025-01-23 RX ADMIN — PREGABALIN 150 MG: 75 CAPSULE ORAL at 00:00

## 2025-01-23 RX ADMIN — DEXAMETHASONE SODIUM PHOSPHATE 10 MG: 10 INJECTION, SOLUTION INTRAMUSCULAR; INTRAVENOUS at 09:18

## 2025-01-23 RX ADMIN — LOSARTAN POTASSIUM 50 MG: 50 TABLET, FILM COATED ORAL at 09:28

## 2025-01-23 ASSESSMENT — ACTIVITIES OF DAILY LIVING (ADL)
ADLS_ACUITY_SCORE: 41

## 2025-01-23 NOTE — ED PROVIDER NOTES
I assumed care of patient at approximatey 2300. Please see original H&P for full history.     0835   Spoke with patient placement at [facility], no ETA for bed availability.   I spoke with Dr. Ray of [facility]. States that if patient can have MR and there is no evidence of surgical complication, as long as she is neurologically intact then could be admitted anywhere for pain control. We will attempt to see if patient can go to [facility] for MR although unclear if they can facilitate this with patient's stimulator.     Signed out to Dr. Stewart pending facilitation of MD Roby Valdes Connie, MD  01/23/25 2770

## 2025-01-23 NOTE — ED NOTES
Pt. Placed on pure wick. Pt. Unable to provide any assistance in rolling in bed because of pain. Can wiggle toes and hands but cannot roll self or bend knees. Pt. Refuses to roll to remove soiled brief due to pain. Pain meds applied, will re attempt rolling patient when pain under control.

## 2025-03-11 ENCOUNTER — LAB REQUISITION (OUTPATIENT)
Dept: LAB | Facility: CLINIC | Age: 75
End: 2025-03-11
Payer: COMMERCIAL

## 2025-03-11 DIAGNOSIS — T81.42XD INFECTION FOLLOWING A PROCEDURE, DEEP INCISIONAL SURGICAL SITE, SUBSEQUENT ENCOUNTER: ICD-10-CM

## 2025-03-12 PROCEDURE — 36415 COLL VENOUS BLD VENIPUNCTURE: CPT | Mod: ORL | Performed by: NURSE PRACTITIONER

## 2025-03-12 PROCEDURE — P9604 ONE-WAY ALLOW PRORATED TRIP: HCPCS | Mod: ORL | Performed by: NURSE PRACTITIONER

## 2025-03-12 PROCEDURE — 80048 BASIC METABOLIC PNL TOTAL CA: CPT | Mod: ORL | Performed by: NURSE PRACTITIONER

## 2025-03-13 LAB
ANION GAP SERPL CALCULATED.3IONS-SCNC: 14 MMOL/L (ref 7–15)
BUN SERPL-MCNC: 10.6 MG/DL (ref 8–23)
CALCIUM SERPL-MCNC: ABNORMAL MG/DL
CHLORIDE SERPL-SCNC: 103 MMOL/L (ref 98–107)
CREAT SERPL-MCNC: 1.16 MG/DL (ref 0.51–0.95)
EGFRCR SERPLBLD CKD-EPI 2021: 49 ML/MIN/1.73M2
GLUCOSE SERPL-MCNC: 122 MG/DL (ref 70–99)
HCO3 SERPL-SCNC: 24 MMOL/L (ref 22–29)
POTASSIUM SERPL-SCNC: 4.1 MMOL/L (ref 3.4–5.3)
SODIUM SERPL-SCNC: 141 MMOL/L (ref 135–145)

## 2025-03-14 ENCOUNTER — LAB REQUISITION (OUTPATIENT)
Dept: LAB | Facility: CLINIC | Age: 75
End: 2025-03-14
Payer: COMMERCIAL

## 2025-03-14 DIAGNOSIS — E44.0 MODERATE PROTEIN-CALORIE MALNUTRITION: ICD-10-CM

## 2025-03-15 LAB
ANION GAP SERPL CALCULATED.3IONS-SCNC: 14 MMOL/L (ref 7–15)
BUN SERPL-MCNC: 10.6 MG/DL (ref 8–23)
CALCIUM SERPL-MCNC: 10.4 MG/DL (ref 8.8–10.4)
CHLORIDE SERPL-SCNC: 103 MMOL/L (ref 98–107)
CREAT SERPL-MCNC: 1.16 MG/DL (ref 0.51–0.95)
EGFRCR SERPLBLD CKD-EPI 2021: 49 ML/MIN/1.73M2
HCO3 SERPL-SCNC: 24 MMOL/L (ref 22–29)
POTASSIUM SERPL-SCNC: 4.1 MMOL/L (ref 3.4–5.3)
SODIUM SERPL-SCNC: 141 MMOL/L (ref 135–145)

## 2025-03-17 ENCOUNTER — LAB REQUISITION (OUTPATIENT)
Dept: LAB | Facility: CLINIC | Age: 75
End: 2025-03-17
Payer: COMMERCIAL

## 2025-03-17 DIAGNOSIS — E44.0 MODERATE PROTEIN-CALORIE MALNUTRITION: ICD-10-CM

## 2025-03-17 DIAGNOSIS — T81.42XD INFECTION FOLLOWING A PROCEDURE, DEEP INCISIONAL SURGICAL SITE, SUBSEQUENT ENCOUNTER: ICD-10-CM

## 2025-03-18 LAB
ALBUMIN SERPL BCG-MCNC: 3.7 G/DL (ref 3.5–5.2)
CRP SERPL-MCNC: 39.2 MG/L
ERYTHROCYTE [DISTWIDTH] IN BLOOD BY AUTOMATED COUNT: 14.8 % (ref 10–15)
HCT VFR BLD AUTO: 28.6 % (ref 35–47)
HGB BLD-MCNC: 9.1 G/DL (ref 11.7–15.7)
MCH RBC QN AUTO: 30.3 PG (ref 26.5–33)
MCHC RBC AUTO-ENTMCNC: 31.8 G/DL (ref 31.5–36.5)
MCV RBC AUTO: 95 FL (ref 78–100)
PLATELET # BLD AUTO: 556 10E3/UL (ref 150–450)
PREALB SERPL-MCNC: 14.1 MG/DL (ref 20–40)
RBC # BLD AUTO: 3 10E6/UL (ref 3.8–5.2)
WBC # BLD AUTO: 6.7 10E3/UL (ref 4–11)

## 2025-03-18 PROCEDURE — 84134 ASSAY OF PREALBUMIN: CPT | Mod: ORL | Performed by: NURSE PRACTITIONER

## 2025-03-18 PROCEDURE — 86140 C-REACTIVE PROTEIN: CPT | Mod: ORL | Performed by: NURSE PRACTITIONER

## 2025-03-18 PROCEDURE — P9604 ONE-WAY ALLOW PRORATED TRIP: HCPCS | Mod: ORL | Performed by: NURSE PRACTITIONER

## 2025-03-18 PROCEDURE — 82040 ASSAY OF SERUM ALBUMIN: CPT | Mod: ORL | Performed by: NURSE PRACTITIONER

## 2025-03-18 PROCEDURE — 36415 COLL VENOUS BLD VENIPUNCTURE: CPT | Mod: ORL | Performed by: NURSE PRACTITIONER

## 2025-03-18 PROCEDURE — 85027 COMPLETE CBC AUTOMATED: CPT | Mod: ORL | Performed by: NURSE PRACTITIONER

## 2025-03-20 ENCOUNTER — LAB REQUISITION (OUTPATIENT)
Dept: LAB | Facility: CLINIC | Age: 75
End: 2025-03-20
Payer: COMMERCIAL

## 2025-03-20 DIAGNOSIS — E44.0 MODERATE PROTEIN-CALORIE MALNUTRITION: ICD-10-CM

## 2025-03-21 LAB — ALBUMIN SERPL BCG-MCNC: 3.9 G/DL (ref 3.5–5.2)

## 2025-03-21 PROCEDURE — 82040 ASSAY OF SERUM ALBUMIN: CPT | Mod: ORL | Performed by: NURSE PRACTITIONER

## 2025-03-21 PROCEDURE — 36415 COLL VENOUS BLD VENIPUNCTURE: CPT | Mod: ORL | Performed by: NURSE PRACTITIONER

## 2025-03-21 PROCEDURE — P9603 ONE-WAY ALLOW PRORATED MILES: HCPCS | Mod: ORL | Performed by: NURSE PRACTITIONER

## 2025-03-21 PROCEDURE — 84134 ASSAY OF PREALBUMIN: CPT | Mod: ORL | Performed by: NURSE PRACTITIONER

## 2025-03-22 LAB — PREALB SERPL-MCNC: 16.1 MG/DL (ref 20–40)

## 2025-03-24 ENCOUNTER — LAB REQUISITION (OUTPATIENT)
Dept: LAB | Facility: CLINIC | Age: 75
End: 2025-03-24
Payer: COMMERCIAL

## 2025-03-24 DIAGNOSIS — T81.42XA INFECTION FOLLOWING A PROCEDURE, DEEP INCISIONAL SURGICAL SITE, INITIAL ENCOUNTER: ICD-10-CM

## 2025-03-25 LAB
ANION GAP SERPL CALCULATED.3IONS-SCNC: 9 MMOL/L (ref 7–15)
BUN SERPL-MCNC: 17.4 MG/DL (ref 8–23)
CALCIUM SERPL-MCNC: 10.2 MG/DL (ref 8.8–10.4)
CHLORIDE SERPL-SCNC: 104 MMOL/L (ref 98–107)
CREAT SERPL-MCNC: 1.18 MG/DL (ref 0.51–0.95)
EGFRCR SERPLBLD CKD-EPI 2021: 48 ML/MIN/1.73M2
GLUCOSE SERPL-MCNC: 94 MG/DL (ref 70–99)
HCO3 SERPL-SCNC: 24 MMOL/L (ref 22–29)
POTASSIUM SERPL-SCNC: 4.1 MMOL/L (ref 3.4–5.3)
SODIUM SERPL-SCNC: 137 MMOL/L (ref 135–145)

## 2025-03-25 PROCEDURE — 36415 COLL VENOUS BLD VENIPUNCTURE: CPT | Mod: ORL | Performed by: NURSE PRACTITIONER

## 2025-03-25 PROCEDURE — 80048 BASIC METABOLIC PNL TOTAL CA: CPT | Mod: ORL | Performed by: NURSE PRACTITIONER

## 2025-03-25 PROCEDURE — P9603 ONE-WAY ALLOW PRORATED MILES: HCPCS | Mod: ORL | Performed by: NURSE PRACTITIONER

## 2025-04-01 ENCOUNTER — LAB REQUISITION (OUTPATIENT)
Dept: LAB | Facility: CLINIC | Age: 75
End: 2025-04-01
Payer: COMMERCIAL

## 2025-04-01 DIAGNOSIS — T81.42XD INFECTION FOLLOWING A PROCEDURE, DEEP INCISIONAL SURGICAL SITE, SUBSEQUENT ENCOUNTER: ICD-10-CM

## 2025-04-02 LAB
ANION GAP SERPL CALCULATED.3IONS-SCNC: 14 MMOL/L (ref 7–15)
BUN SERPL-MCNC: 21 MG/DL (ref 8–23)
CALCIUM SERPL-MCNC: 10.7 MG/DL (ref 8.8–10.4)
CHLORIDE SERPL-SCNC: 103 MMOL/L (ref 98–107)
CREAT SERPL-MCNC: 1.37 MG/DL (ref 0.51–0.95)
EGFRCR SERPLBLD CKD-EPI 2021: 40 ML/MIN/1.73M2
GLUCOSE SERPL-MCNC: 170 MG/DL (ref 70–99)
HCO3 SERPL-SCNC: 22 MMOL/L (ref 22–29)
POTASSIUM SERPL-SCNC: 3.9 MMOL/L (ref 3.4–5.3)
SODIUM SERPL-SCNC: 139 MMOL/L (ref 135–145)

## 2025-04-02 PROCEDURE — 80048 BASIC METABOLIC PNL TOTAL CA: CPT | Mod: ORL | Performed by: NURSE PRACTITIONER

## 2025-04-02 PROCEDURE — P9604 ONE-WAY ALLOW PRORATED TRIP: HCPCS | Mod: ORL | Performed by: NURSE PRACTITIONER

## 2025-04-02 PROCEDURE — 36415 COLL VENOUS BLD VENIPUNCTURE: CPT | Mod: ORL | Performed by: NURSE PRACTITIONER

## 2025-04-07 ENCOUNTER — LAB REQUISITION (OUTPATIENT)
Dept: LAB | Facility: CLINIC | Age: 75
End: 2025-04-07
Payer: COMMERCIAL

## 2025-04-07 DIAGNOSIS — T81.42XD INFECTION FOLLOWING A PROCEDURE, DEEP INCISIONAL SURGICAL SITE, SUBSEQUENT ENCOUNTER: ICD-10-CM

## 2025-04-08 ENCOUNTER — LAB REQUISITION (OUTPATIENT)
Dept: LAB | Facility: CLINIC | Age: 75
End: 2025-04-08
Payer: COMMERCIAL

## 2025-04-08 DIAGNOSIS — T81.328D: ICD-10-CM

## 2025-04-08 LAB
ANION GAP SERPL CALCULATED.3IONS-SCNC: 13 MMOL/L (ref 7–15)
BUN SERPL-MCNC: 22.4 MG/DL (ref 8–23)
CALCIUM SERPL-MCNC: 10.8 MG/DL (ref 8.8–10.4)
CHLORIDE SERPL-SCNC: 101 MMOL/L (ref 98–107)
CREAT SERPL-MCNC: 1.79 MG/DL (ref 0.51–0.95)
EGFRCR SERPLBLD CKD-EPI 2021: 29 ML/MIN/1.73M2
GLUCOSE SERPL-MCNC: 99 MG/DL (ref 70–99)
HCO3 SERPL-SCNC: 23 MMOL/L (ref 22–29)
POTASSIUM SERPL-SCNC: 4.9 MMOL/L (ref 3.4–5.3)
SODIUM SERPL-SCNC: 137 MMOL/L (ref 135–145)

## 2025-04-08 PROCEDURE — 80048 BASIC METABOLIC PNL TOTAL CA: CPT | Mod: ORL | Performed by: NURSE PRACTITIONER

## 2025-04-08 PROCEDURE — 36415 COLL VENOUS BLD VENIPUNCTURE: CPT | Mod: ORL | Performed by: NURSE PRACTITIONER

## 2025-04-08 PROCEDURE — P9603 ONE-WAY ALLOW PRORATED MILES: HCPCS | Mod: ORL | Performed by: NURSE PRACTITIONER

## 2025-04-09 LAB
ALBUMIN SERPL BCG-MCNC: 3.9 G/DL (ref 3.5–5.2)
PREALB SERPL-MCNC: 17.8 MG/DL (ref 20–40)

## 2025-04-09 PROCEDURE — 84134 ASSAY OF PREALBUMIN: CPT | Mod: ORL | Performed by: NURSE PRACTITIONER

## 2025-04-09 PROCEDURE — 36415 COLL VENOUS BLD VENIPUNCTURE: CPT | Mod: ORL | Performed by: NURSE PRACTITIONER

## 2025-04-09 PROCEDURE — P9603 ONE-WAY ALLOW PRORATED MILES: HCPCS | Mod: ORL | Performed by: NURSE PRACTITIONER

## 2025-04-09 PROCEDURE — 82040 ASSAY OF SERUM ALBUMIN: CPT | Mod: ORL | Performed by: NURSE PRACTITIONER

## 2025-04-14 ENCOUNTER — LAB REQUISITION (OUTPATIENT)
Dept: LAB | Facility: CLINIC | Age: 75
End: 2025-04-14
Payer: COMMERCIAL

## 2025-04-14 DIAGNOSIS — T81.42XD INFECTION FOLLOWING A PROCEDURE, DEEP INCISIONAL SURGICAL SITE, SUBSEQUENT ENCOUNTER: ICD-10-CM

## 2025-04-15 ENCOUNTER — LAB REQUISITION (OUTPATIENT)
Dept: LAB | Facility: CLINIC | Age: 75
End: 2025-04-15
Payer: COMMERCIAL

## 2025-04-15 DIAGNOSIS — N17.8 OTHER ACUTE KIDNEY FAILURE: ICD-10-CM

## 2025-04-15 LAB
ANION GAP SERPL CALCULATED.3IONS-SCNC: 13 MMOL/L (ref 7–15)
BUN SERPL-MCNC: 18.5 MG/DL (ref 8–23)
CALCIUM SERPL-MCNC: 11 MG/DL (ref 8.8–10.4)
CHLORIDE SERPL-SCNC: 101 MMOL/L (ref 98–107)
CREAT SERPL-MCNC: 1.58 MG/DL (ref 0.51–0.95)
EGFRCR SERPLBLD CKD-EPI 2021: 34 ML/MIN/1.73M2
GLUCOSE SERPL-MCNC: 141 MG/DL (ref 70–99)
HCO3 SERPL-SCNC: 23 MMOL/L (ref 22–29)
POTASSIUM SERPL-SCNC: 4.4 MMOL/L (ref 3.4–5.3)
SODIUM SERPL-SCNC: 137 MMOL/L (ref 135–145)

## 2025-04-16 LAB
ANION GAP SERPL CALCULATED.3IONS-SCNC: 10 MMOL/L (ref 7–15)
BASOPHILS # BLD AUTO: 0 10E3/UL (ref 0–0.2)
BASOPHILS NFR BLD AUTO: 1 %
BUN SERPL-MCNC: 18.4 MG/DL (ref 8–23)
CALCIUM SERPL-MCNC: 10.6 MG/DL (ref 8.8–10.4)
CHLORIDE SERPL-SCNC: 102 MMOL/L (ref 98–107)
CREAT SERPL-MCNC: 1.56 MG/DL (ref 0.51–0.95)
CRP SERPL-MCNC: 4.93 MG/L
EGFRCR SERPLBLD CKD-EPI 2021: 34 ML/MIN/1.73M2
EOSINOPHIL # BLD AUTO: 0.1 10E3/UL (ref 0–0.7)
EOSINOPHIL NFR BLD AUTO: 2 %
ERYTHROCYTE [DISTWIDTH] IN BLOOD BY AUTOMATED COUNT: 14.2 % (ref 10–15)
ERYTHROCYTE [SEDIMENTATION RATE] IN BLOOD BY WESTERGREN METHOD: 68 MM/HR (ref 0–30)
GLUCOSE SERPL-MCNC: 88 MG/DL (ref 70–99)
HCO3 SERPL-SCNC: 24 MMOL/L (ref 22–29)
HCT VFR BLD AUTO: 28.5 % (ref 35–47)
HGB BLD-MCNC: 8.9 G/DL (ref 11.7–15.7)
IMM GRANULOCYTES # BLD: 0 10E3/UL
IMM GRANULOCYTES NFR BLD: 0 %
LYMPHOCYTES # BLD AUTO: 2.3 10E3/UL (ref 0.8–5.3)
LYMPHOCYTES NFR BLD AUTO: 39 %
MCH RBC QN AUTO: 30.4 PG (ref 26.5–33)
MCHC RBC AUTO-ENTMCNC: 31.2 G/DL (ref 31.5–36.5)
MCV RBC AUTO: 97 FL (ref 78–100)
MONOCYTES # BLD AUTO: 0.8 10E3/UL (ref 0–1.3)
MONOCYTES NFR BLD AUTO: 13 %
NEUTROPHILS # BLD AUTO: 2.7 10E3/UL (ref 1.6–8.3)
NEUTROPHILS NFR BLD AUTO: 46 %
NRBC # BLD AUTO: 0 10E3/UL
NRBC BLD AUTO-RTO: 0 /100
PLATELET # BLD AUTO: 436 10E3/UL (ref 150–450)
POTASSIUM SERPL-SCNC: 4.1 MMOL/L (ref 3.4–5.3)
RBC # BLD AUTO: 2.93 10E6/UL (ref 3.8–5.2)
SODIUM SERPL-SCNC: 136 MMOL/L (ref 135–145)
WBC # BLD AUTO: 5.9 10E3/UL (ref 4–11)

## 2025-04-16 PROCEDURE — 85652 RBC SED RATE AUTOMATED: CPT | Mod: ORL | Performed by: NURSE PRACTITIONER

## 2025-04-16 PROCEDURE — 86140 C-REACTIVE PROTEIN: CPT | Mod: ORL | Performed by: NURSE PRACTITIONER

## 2025-04-16 PROCEDURE — 85025 COMPLETE CBC W/AUTO DIFF WBC: CPT | Mod: ORL | Performed by: NURSE PRACTITIONER

## 2025-04-16 PROCEDURE — P9603 ONE-WAY ALLOW PRORATED MILES: HCPCS | Mod: ORL | Performed by: NURSE PRACTITIONER

## 2025-04-16 PROCEDURE — 36415 COLL VENOUS BLD VENIPUNCTURE: CPT | Mod: ORL | Performed by: NURSE PRACTITIONER

## 2025-04-16 PROCEDURE — 80048 BASIC METABOLIC PNL TOTAL CA: CPT | Mod: ORL | Performed by: NURSE PRACTITIONER

## 2025-04-20 ENCOUNTER — LAB REQUISITION (OUTPATIENT)
Dept: LAB | Facility: CLINIC | Age: 75
End: 2025-04-20
Payer: COMMERCIAL

## 2025-04-20 DIAGNOSIS — N17.9 ACUTE KIDNEY FAILURE, UNSPECIFIED: ICD-10-CM

## 2025-04-20 DIAGNOSIS — R19.7 DIARRHEA, UNSPECIFIED: ICD-10-CM

## 2025-04-20 LAB — C DIFF TOX B STL QL: NEGATIVE

## 2025-04-20 PROCEDURE — 87493 C DIFF AMPLIFIED PROBE: CPT | Mod: ORL | Performed by: NURSE PRACTITIONER

## 2025-04-21 ENCOUNTER — LAB REQUISITION (OUTPATIENT)
Dept: LAB | Facility: CLINIC | Age: 75
End: 2025-04-21
Payer: COMMERCIAL

## 2025-04-21 DIAGNOSIS — N17.8 OTHER ACUTE KIDNEY FAILURE: ICD-10-CM

## 2025-04-22 LAB
ANION GAP SERPL CALCULATED.3IONS-SCNC: 12 MMOL/L (ref 7–15)
BUN SERPL-MCNC: 11.4 MG/DL (ref 8–23)
CALCIUM SERPL-MCNC: 10.4 MG/DL (ref 8.8–10.4)
CHLORIDE SERPL-SCNC: 104 MMOL/L (ref 98–107)
CREAT SERPL-MCNC: 1.17 MG/DL (ref 0.51–0.95)
EGFRCR SERPLBLD CKD-EPI 2021: 48 ML/MIN/1.73M2
GLUCOSE SERPL-MCNC: 85 MG/DL (ref 70–99)
HCO3 SERPL-SCNC: 23 MMOL/L (ref 22–29)
POTASSIUM SERPL-SCNC: 4.1 MMOL/L (ref 3.4–5.3)
SODIUM SERPL-SCNC: 139 MMOL/L (ref 135–145)

## 2025-04-22 PROCEDURE — 36415 COLL VENOUS BLD VENIPUNCTURE: CPT | Mod: ORL | Performed by: NURSE PRACTITIONER

## 2025-04-22 PROCEDURE — 80048 BASIC METABOLIC PNL TOTAL CA: CPT | Mod: ORL | Performed by: NURSE PRACTITIONER

## 2025-04-22 PROCEDURE — P9603 ONE-WAY ALLOW PRORATED MILES: HCPCS | Mod: ORL | Performed by: NURSE PRACTITIONER

## 2025-04-23 ENCOUNTER — LAB REQUISITION (OUTPATIENT)
Dept: LAB | Facility: CLINIC | Age: 75
End: 2025-04-23
Payer: COMMERCIAL

## 2025-04-23 DIAGNOSIS — E44.0 MODERATE PROTEIN-CALORIE MALNUTRITION: ICD-10-CM

## 2025-04-24 LAB
ALBUMIN SERPL BCG-MCNC: 4.1 G/DL (ref 3.5–5.2)
HOLD SPECIMEN: NORMAL
PREALB SERPL-MCNC: 20 MG/DL (ref 20–40)

## 2025-04-24 PROCEDURE — P9604 ONE-WAY ALLOW PRORATED TRIP: HCPCS | Mod: ORL | Performed by: NURSE PRACTITIONER

## 2025-04-24 PROCEDURE — 36415 COLL VENOUS BLD VENIPUNCTURE: CPT | Mod: ORL | Performed by: NURSE PRACTITIONER

## 2025-04-24 PROCEDURE — 82040 ASSAY OF SERUM ALBUMIN: CPT | Mod: ORL | Performed by: NURSE PRACTITIONER

## 2025-04-24 PROCEDURE — 84134 ASSAY OF PREALBUMIN: CPT | Mod: ORL | Performed by: NURSE PRACTITIONER

## (undated) DIAGNOSIS — Z01.818 PREOP EXAMINATION: ICD-10-CM

## (undated) DEVICE — ABSORBABLE HEMOSTAT (OXIDIZED REGENERATED CELLULOSE, U.S.P.): Brand: SURGICEL

## (undated) DEVICE — GAMMEX® PI HYBRID SIZE 8, STERILE POWDER-FREE SURGICAL GLOVE, POLYISOPRENE AND NEOPRENE BLEND: Brand: GAMMEX

## (undated) DEVICE — COTTON TIPPED APPLICATOR 3IN

## (undated) DEVICE — SUCTION CANISTER, 3000CC,SAFELINER: Brand: DEROYAL

## (undated) DEVICE — MAT TRANSFER HALFMATS 39 X 49

## (undated) DEVICE — SLEEVE KENDALL SCD EXPRESS MED

## (undated) DEVICE — Device: Brand: INJETAK ADJUSTABLE TIP NEEDLE 35CM

## (undated) DEVICE — STANDARD HYPODERMIC NEEDLE,POLYPROPYLENE HUB: Brand: MONOJECT

## (undated) DEVICE — SOLUTION  .9 1000ML BTL

## (undated) DEVICE — UROLOGY DRAIN BAG

## (undated) DEVICE — SUT PLAIN GUT 4-0 SC-1 1828H

## (undated) DEVICE — SOL H2O IV

## (undated) DEVICE — GAMMEX® PI HYBRID SIZE 6, STERILE POWDER-FREE SURGICAL GLOVE, POLYISOPRENE AND NEOPRENE BLEND: Brand: GAMMEX

## (undated) DEVICE — SOL NACL IRRIG 0.9% 1000ML BTL

## (undated) DEVICE — SUT PLAIN GUT 5-0 PC-1 1915G

## (undated) DEVICE — GAMMEX® PI HYBRID SIZE 6.5, STERILE POWDER-FREE SURGICAL GLOVE, POLYISOPRENE AND NEOPRENE BLEND: Brand: GAMMEX

## (undated) DEVICE — CYSTO PACK: Brand: MEDLINE INDUSTRIES, INC.

## (undated) DEVICE — GAMMEX® NON-LATEX PI ORTHO SIZE 6.5, STERILE POLYISOPRENE POWDER-FREE SURGICAL GLOVE: Brand: GAMMEX

## (undated) DEVICE — HEAD & NECK: Brand: MEDLINE INDUSTRIES, INC.

## (undated) DEVICE — PAD,EYE,LARGE,2 1/8"X2 5/8",STERILE,LF: Brand: MEDLINE

## (undated) DEVICE — MEDI-VAC NON-CONDUCTIVE SUCTION TUBING: Brand: CARDINAL HEALTH

## (undated) DEVICE — NASAL ACCESSORY: Brand: MEDLINE INDUSTRIES, INC.

## (undated) DEVICE — SOL H2O IRRIGATION 3000ML

## (undated) DEVICE — SYRINGE MNJCT 10ML LF STRL REG

## (undated) NOTE — Clinical Note
This medicare patient is moving to [address] [place] [address] in 3/2023.  Can we remove from list.

## (undated) NOTE — LETTER
[address], [address]  [facility]: [phone]   Consent to Procedure/Sedation    Date: __5/24/2021_____    Time: ___7:34 AM ___    1.  I authorize the performance upon Berna Valdovinos the follow Signature of person authorized to consent for patient: Relationship to patient:  ___________________________    ___________________    Witness: ____________________     Date: ______________    Printed: 5/24/2021   7:34 AM    Patient Name: Gemini Tarango

## (undated) NOTE — LETTER
[address], [place]  Authorization for Invasive Procedures  1.  I hereby authorize  Dr. Tran Swanson  my physician and whomever may be designated as the doctor's assistant, to perform the following operation and/or procedure: Cardiac Cath 5. I consent to the photographing of the operations or procedures to be performed for the purposes of advancing medicine, science, and/or education, provided my identity is not revealed.  If the procedure has been videotaped, the physician/surgeon will obta __________ Time: ___________    Statement of Physician  My signature below affirms that prior to the time of the procedure, I have explained to the patient and/or her legal representative, the risks and benefits involved in the proposed treatment and any r

## (undated) NOTE — ED AVS SNAPSHOT
[facility] Emergency Department    [address]     [place] [address]    Phone:  [phone]    Fax:  Virginia Sood   MRN: [id number]    Department:  [facility] Emergency Department   Date of Visit:  3/5/20 and Class Registration line at [phone] or find a doctor online by visiting [email or web address].    IF THERE IS ANY CHANGE OR WORSENING OF YOUR CONDITION, CALL YOUR PRIMARY CARE PHYSICIAN AT ONCE OR RETURN IMMEDIATELY TO [address] [place] [address].     If

## (undated) NOTE — ED AVS SNAPSHOT
[facility] Emergency Department    [address]     [place] [address]    Phone:  [phone]    Fax:  Virginia Sood   MRN: [id number]    Department:  [facility] Emergency Department   Date of Visit:  3/5/20 ointment, and be sure to keep the area clean and dry. You may shower as usual, but do not submerge in water, i.e. sink/bath/pool. To help minimize risk of scaring, keep the area covered and use sunscreen when sun exposure is present.  Apply ice to the area return to your personal doctor) about any new or lasting problems. The primary care or specialist physician may see patients referred from the [facility] Emergency Department. Follow-up care is at the discretion of that Physician.   If you n Hussein [address] ([address] [place] [address]) [phone]   Sem[email or web address] ([place] [address]) [address] [facility] [address] Irena Chambers  ([place] [address]) [phone]                Additional Information